# Patient Record
Sex: MALE | Race: WHITE | Employment: FULL TIME | URBAN - METROPOLITAN AREA
[De-identification: names, ages, dates, MRNs, and addresses within clinical notes are randomized per-mention and may not be internally consistent; named-entity substitution may affect disease eponyms.]

---

## 2018-02-02 ENCOUNTER — TRANSFERRED RECORDS (OUTPATIENT)
Dept: HEALTH INFORMATION MANAGEMENT | Facility: CLINIC | Age: 48
End: 2018-02-02

## 2018-04-27 ENCOUNTER — OFFICE VISIT (OUTPATIENT)
Dept: FAMILY MEDICINE | Facility: CLINIC | Age: 48
End: 2018-04-27
Payer: COMMERCIAL

## 2018-04-27 VITALS
SYSTOLIC BLOOD PRESSURE: 122 MMHG | HEIGHT: 72 IN | HEART RATE: 73 BPM | BODY MASS INDEX: 27.77 KG/M2 | DIASTOLIC BLOOD PRESSURE: 80 MMHG | OXYGEN SATURATION: 97 % | WEIGHT: 205 LBS | TEMPERATURE: 98 F

## 2018-04-27 DIAGNOSIS — R00.2 PALPITATIONS: Primary | ICD-10-CM

## 2018-04-27 DIAGNOSIS — J45.20 MILD INTERMITTENT ASTHMA WITHOUT COMPLICATION: ICD-10-CM

## 2018-04-27 DIAGNOSIS — R07.89 ATYPICAL CHEST PAIN: ICD-10-CM

## 2018-04-27 DIAGNOSIS — R94.31 ABNORMAL ELECTROCARDIOGRAM: ICD-10-CM

## 2018-04-27 LAB — TROPONIN I SERPL-MCNC: <0.015 UG/L (ref 0–0.04)

## 2018-04-27 PROCEDURE — 93000 ELECTROCARDIOGRAM COMPLETE: CPT | Performed by: INTERNAL MEDICINE

## 2018-04-27 PROCEDURE — 36415 COLL VENOUS BLD VENIPUNCTURE: CPT | Performed by: INTERNAL MEDICINE

## 2018-04-27 PROCEDURE — 99214 OFFICE O/P EST MOD 30 MIN: CPT | Performed by: INTERNAL MEDICINE

## 2018-04-27 PROCEDURE — 86141 C-REACTIVE PROTEIN HS: CPT | Performed by: INTERNAL MEDICINE

## 2018-04-27 PROCEDURE — 84484 ASSAY OF TROPONIN QUANT: CPT | Performed by: INTERNAL MEDICINE

## 2018-04-27 RX ORDER — VENLAFAXINE HYDROCHLORIDE 150 MG/1
150 CAPSULE, EXTENDED RELEASE ORAL DAILY
COMMUNITY

## 2018-04-27 RX ORDER — DEXTROAMPHETAMINE SACCHARATE, AMPHETAMINE ASPARTATE MONOHYDRATE, DEXTROAMPHETAMINE SULFATE AND AMPHETAMINE SULFATE 3.75; 3.75; 3.75; 3.75 MG/1; MG/1; MG/1; MG/1
CAPSULE, EXTENDED RELEASE ORAL
Refills: 0 | COMMUNITY
Start: 2017-09-07

## 2018-04-27 ASSESSMENT — ANXIETY QUESTIONNAIRES
IF YOU CHECKED OFF ANY PROBLEMS ON THIS QUESTIONNAIRE, HOW DIFFICULT HAVE THESE PROBLEMS MADE IT FOR YOU TO DO YOUR WORK, TAKE CARE OF THINGS AT HOME, OR GET ALONG WITH OTHER PEOPLE: NOT DIFFICULT AT ALL
6. BECOMING EASILY ANNOYED OR IRRITABLE: NOT AT ALL
GAD7 TOTAL SCORE: 0
3. WORRYING TOO MUCH ABOUT DIFFERENT THINGS: NOT AT ALL
2. NOT BEING ABLE TO STOP OR CONTROL WORRYING: NOT AT ALL
1. FEELING NERVOUS, ANXIOUS, OR ON EDGE: NOT AT ALL
7. FEELING AFRAID AS IF SOMETHING AWFUL MIGHT HAPPEN: NOT AT ALL
5. BEING SO RESTLESS THAT IT IS HARD TO SIT STILL: NOT AT ALL

## 2018-04-27 ASSESSMENT — PATIENT HEALTH QUESTIONNAIRE - PHQ9: 5. POOR APPETITE OR OVEREATING: NOT AT ALL

## 2018-04-27 ASSESSMENT — PAIN SCALES - GENERAL: PAINLEVEL: NO PAIN (0)

## 2018-04-27 NOTE — MR AVS SNAPSHOT
After Visit Summary   4/27/2018    Conor Contreras    MRN: [id number]           Patient Information     Date Of Birth          1970        Visit Information        Provider Department      4/27/2018 7:40 AM Rocky Yi MD [facility]        Today's Diagnoses     Palpitations    -  1    Abnormal electrocardiogram        Atypical chest pain          Care Instructions    At [facility], we strive to deliver an exceptional experience to you, every time we see you.  If you receive a survey in the mail, please send us back your thoughts. We really do value your feedback.    Based on your medical history, these are the current health maintenance/preventive care services that you are due for (some may have been done at this visit.)  Health Maintenance Due   Topic Date Due     DEPRESSION ACTION PLAN Q1 YR  03/07/1987     HIV SCREEN (SYSTEM ASSIGNED)  03/07/1987     PHQ-9 Q6 MONTHS  09/18/2014     ASTHMA ACTION PLAN Q1 YR  11/07/2015     ASTHMA CONTROL TEST Q6 MOS  07/25/2016     INFLUENZA VACCINE (1) 09/01/2017         Suggested websites for health information:  Www.Swyft.[employer]Cal : Up to date and easily searchable information on multiple topics.  Www.medlineplus.gov : medication info, interactive tutorials, watch real surgeries online  Www.familydoctor.org : good info from the Academy of Family Physicians  Www.cdc.gov : public health info, travel advisories, epidemics (H1N1)  Www.aap.org : children's health info, normal development, vaccinations  Www.health.[facility].[place].us : [place] dept of health, public health issues in [place], N1N1    Your care team:                            Family Medicine Internal Medicine   MD Rocky Rodriguez MD Shantel Branch-Fleming, MD Katya Georgiev PA-C Nam Ho, MD Pediatrics   UNA Humphries, CNP Nargis Yousif APRN MD Rosalinda Diaz MD Deborah Mielke, MD Kim Thein, APRN  "CNP      Clinic hours: Monday - Thursday 7 am-7 pm; Fridays 7 am-5 pm.   Urgent care: Monday - Friday 11 am-9 pm; Saturday and Sunday 9 am-5 pm.  Pharmacy : Monday -Thursday 8 am-8 pm; Friday 8 am-6 pm; Saturday and Sunday 9 am-5 pm.     Clinic: [phone]   Pharmacy: [phone]            Follow-ups after your visit        Future tests that were ordered for you today     Open Future Orders        Priority Expected Expires Ordered    Exercise Stress Echocardiogram Routine  4/27/2019 4/27/2018            Who to contact     If you have questions or need follow up information about today's clinic visit or your schedule please contact [facility] directly at [phone].  Normal or non-critical lab and imaging results will be communicated to you by MyChart, letter or phone within 4 business days after the clinic has received the results. If you do not hear from us within 7 days, please contact the clinic through [employer] or phone. If you have a critical or abnormal lab result, we will notify you by phone as soon as possible.  Submit refill requests through [employer] or call your pharmacy and they will forward the refill request to us. Please allow 3 business days for your refill to be completed.          Additional Information About Your Visit        [employer] Information     [employer] gives you secure access to your electronic health record. If you see a primary care provider, you can also send messages to your care team and make appointments. If you have questions, please call your primary care clinic.  If you do not have a primary care provider, please call [phone] and they will assist you.        Care EveryWhere ID     This is your Care EveryWhere ID. This could be used by other organizations to access your [place] medical records  [id number]        Your Vitals Were     Pulse Temperature Height Pulse Oximetry BMI (Body Mass Index)       73 98  F (36.7  C) (Oral) 5' 11.5\" (1.816 m) 97% " 28.19 kg/m2        Blood Pressure from Last 3 Encounters:   04/27/18 122/80   12/07/16 111/76   04/05/16 123/80    Weight from Last 3 Encounters:   04/27/18 205 lb (93 kg)   12/07/16 187 lb (84.8 kg)   04/05/16 189 lb 6.4 oz (85.9 kg)              We Performed the Following     CRP cardiac risk     EKG 12-lead complete w/read - Clinics     Troponin I        Primary Care Provider Office Phone # Fax #    Rocky Yi [phone]       [address]  [facility] [address]        Equal Access to Services     [facility] CenterSAE : Hadii aad ku hadasho Soomaali, waaxda luqadaha, qaybta kaalmada adeegyada, waxay tala owens . So [facility] [phone].    ATENCIÓN: Si habla español, tiene a jimenez disposición servicios gratuitos de asistencia lingüística. Llame al [phone].    We comply with applicable federal civil rights laws and [place] laws. We do not discriminate on the basis of race, color, national origin, age, disability, sex, sexual orientation, or gender identity.            Thank you!     Thank you for choosing [facility]  for your care. Our goal is always to provide you with excellent care. Hearing back from our patients is one way we can continue to improve our services. Please take a few minutes to complete the written survey that you may receive in the mail after your visit with us. Thank you!             Your Updated Medication List - Protect others around you: Learn how to safely use, store and throw away your medicines at www.disposemymeds.org.          This list is accurate as of 4/27/18  8:31 AM.  Always use your most recent med list.                   Brand Name Dispense Instructions for use Diagnosis    amphetamine-dextroamphetamine 15 MG per 24 hr capsule    ADDERALL XR     TAKE ONE CAPSULE BY MOUTH EVERY MORNING PLEASE MAKE AN APPOINTMENT FOR FUTURE REFILLS        cetirizine 10 MG tablet    zyrTEC    30 tablet    Take 1 tablet (10 mg) by  mouth At Bedtime    Chronic nonallergic rhinitis       EFFEXOR  MG 24 hr capsule   Generic drug:  venlafaxine      Take 150 mg by mouth daily

## 2018-04-27 NOTE — PROGRESS NOTES
SUBJECTIVE:   Conor Contreras is a 48 year old male who presents to clinic today for the following health issues:    Palpitations     Onset: 1-2 months    Description:   Character: Sensation of heart fluttering  Radiation: 2 days  Duration: all day     Intensity: mild    Progression of Symptoms:  improving    Accompanying Signs & Symptoms:Chest pain  Shortness of breath: no  Sweating: no  Nausea/vomiting: no  Lightheadedness: no  Palpitations: YES  Fever/Chills: no  Cough: no  Heartburn: no    History:   Family history of heart disease YES- grandfather had heart issue/stroke  Tobacco use: no    Precipitating factors: Excessive intake of caffeine, associated with daily Adderall use followed by exercise.  Worse with exertion: no  Worse with deep breaths :  no    Alleviating factors: none         Therapies Tried and outcome: none    Asthma Follow-Up    Was ACT completed today?    Yes    ACT Total Scores 4/27/2018   ACT TOTAL SCORE -   ASTHMA ER VISITS -   ASTHMA HOSPITALIZATIONS -   ACT TOTAL SCORE (Goal Greater than or Equal to 20) 25   In the past 12 months, how many times did you visit the emergency room for your asthma without being admitted to the hospital? 0   In the past 12 months, how many times were you hospitalized overnight because of your asthma? 0       Recent asthma triggers that patient is dealing with: None        Problem list and histories reviewedProblem list and histories reviewed & adjusted, as indicated.  Additional history: as documented    Patient Active Problem List   Diagnosis     ADD (attention deficit disorder)     HARLAN (obstructive sleep apnea)     Hyperlipidemia LDL goal <160     Generalized anxiety disorder     Benign skin lesion of face     Dysthymia     Allergic rhinitis     Abnormal CXR perihilar nodes on CT chest     History of drug abuse     Alcohol abuse     Mild intermittent asthma     Past Surgical History:   Procedure Laterality Date     ENDOBRONCHIAL ULTRASOUND FLEXIBLE N/A  2015    Procedure: ENDOBRONCHIAL ULTRASOUND FLEXIBLE;  Surgeon: Ramakrishna Williamson MD;  Location:  GI     VASECTOMY         Social History   Substance Use Topics     Smoking status: Former Smoker     Packs/day: 0.50     Years: 10.00     Types: Cigarettes     Quit date: 3/18/2001     Smokeless tobacco: Never Used      Comment: QUIT 9 YEARS AGO     Alcohol use 0.0 oz/week     0 Standard drinks or equivalent per week      Comment: very rarely     Family History   Problem Relation Age of Onset     C.A.D. Maternal Grandfather      Alcohol/Drug Maternal Grandfather       from alcoholism     CEREBROVASCULAR DISEASE Maternal Grandfather      Lipids Father      CANCER Maternal Grandmother      Pancreatic     CANCER Paternal Grandmother      Lung, may not have been primary     HEART DISEASE Paternal Grandfather      Anxiety Disorder Sister      Anxiety Disorder Sister      Asthma No family hx of      DIABETES No family hx of      Hypertension No family hx of      Breast Cancer No family hx of      Cancer - colorectal No family hx of      Prostate Cancer No family hx of      Connective Tissue Disorder No family hx of      Thyroid Disease No family hx of      LUNG DISEASE Maternal Uncle      Asthma versus sarcoid         Current Outpatient Prescriptions   Medication Sig Dispense Refill     amphetamine-dextroamphetamine (ADDERALL XR) 15 MG per 24 hr capsule TAKE ONE CAPSULE BY MOUTH EVERY MORNING PLEASE MAKE AN APPOINTMENT FOR FUTURE REFILLS  0     cetirizine (ZYRTEC) 10 MG tablet Take 1 tablet (10 mg) by mouth At Bedtime 30 tablet 11     venlafaxine (EFFEXOR XR) 150 MG 24 hr capsule Take 150 mg by mouth daily       No Known Allergies  Recent Labs   Lab Test  16   1147  01/07/15   0829  12   0858  10/27/11   1558  03/09/10   0933   LDL   --   122  132*   --   169*   HDL   --   50  39*   --   37*   TRIG   --   114  164*   --   139   ALT  67  56   --    --    --    CR   --   0.97   --    --    --   "  GFRESTIMATED   --   83   --    --    --    GFRESTBLACK   --   >90   GFR Calc     --    --    --    POTASSIUM   --   3.7   --    --    --    TSH   --   1.46   --   1.38   --       BP Readings from Last 3 Encounters:   04/27/18 122/80   12/07/16 111/76   04/05/16 123/80    Wt Readings from Last 3 Encounters:   04/27/18 205 lb (93 kg)   12/07/16 187 lb (84.8 kg)   04/05/16 189 lb 6.4 oz (85.9 kg)                 ROS:  CONSTITUTIONAL: NEGATIVE for fever, chills, change in weight  INTEGUMENTARY/SKIN: NEGATIVE for worrisome rashes, moles or lesions  EYES: NEGATIVE for vision changes or irritation  ENT/MOUTH: NEGATIVE for ear, mouth and throat problems  RESP: NEGATIVE for significant cough or SOB  CV: NEGATIVE for claudication, diaphoresis, lower extremity edema, orthopnea, palpitations and paroxysmal nocturnal dyspnea  GI: NEGATIVE for nausea, abdominal pain, heartburn, or change in bowel habits  : NEGATIVE for frequency, dysuria, or hematuria  MUSCULOSKELETAL: NEGATIVE for significant arthralgias or myalgia  NEURO: NEGATIVE for weakness, dizziness or paresthesias  ENDOCRINE: NEGATIVE for temperature intolerance, skin/hair changes  HEME: NEGATIVE for bleeding problems  PSYCHIATRIC: NEGATIVE for changes in mood or affect    OBJECTIVE:     /80 (BP Location: Left arm, Patient Position: Chair, Cuff Size: Adult Regular)  Pulse 73  Temp 98  F (36.7  C) (Oral)  Ht 5' 11.5\" (1.816 m)  Wt 205 lb (93 kg)  SpO2 97%  BMI 28.19 kg/m2  Body mass index is 28.19 kg/(m^2).  GENERAL: healthy, alert and no distress  EYES: Eyes grossly normal to inspection and conjunctivae and sclerae normal  HENT: normal cephalic/atraumatic and oral mucous membranes moist  RESP: lungs clear to auscultation - no rales, rhonchi or wheezes  CV: regular rate and rhythm, normal S1 S2, no S3 or S4, no murmur, click or rub, no peripheral edema and peripheral pulses strong  MS: no gross musculoskeletal defects noted, no edema  SKIN: " no suspicious lesions or rashes  NEURO: Normal strength and tone, mentation intact and speech normal  PSYCH: mentation appears normal, affect normal/bright    Diagnostic Test Results:  Results for orders placed or performed in visit on 04/27/18 (from the past 24 hour(s))   Troponin I   Result Value Ref Range    Troponin I ES <0.015 0.000 - 0.045 ug/L   12-lead EKG shows T-wave inversions at leads V1 and V2.    ASSESSMENT/PLAN:     (R00.2) Palpitations  (primary encounter diagnosis)  Comment: Secondary to high caffeine intake, Adderall use and increased exercise.  Plan: EKG 12-lead complete w/read - Clinics            (R07.89) Atypical chest pain  Comment: Despite negative Troponin I, proceed with stress test.  Plan: Exercise Stress Echocardiogram            (R94.31) Abnormal electrocardiogram  Comment: No underlying ischemic heart disease. EKG shows T-wave inversions at anterior leads.  Plan: Troponin I, CRP cardiac risk, Exercise Stress         Echocardiogram            (J45.20) Mild intermittent asthma without complication  Comment: Stable condition without recent exacerbations.      Follow-up visit if condition worsens.    Rocky Yi MD  [facility]

## 2018-04-27 NOTE — PATIENT INSTRUCTIONS
At [facility], we strive to deliver an exceptional experience to you, every time we see you.  If you receive a survey in the mail, please send us back your thoughts. We really do value your feedback.    Based on your medical history, these are the current health maintenance/preventive care services that you are due for (some may have been done at this visit.)  Health Maintenance Due   Topic Date Due     DEPRESSION ACTION PLAN Q1 YR  03/07/1987     HIV SCREEN (SYSTEM ASSIGNED)  03/07/1987     PHQ-9 Q6 MONTHS  09/18/2014     ASTHMA ACTION PLAN Q1 YR  11/07/2015     ASTHMA CONTROL TEST Q6 MOS  07/25/2016     INFLUENZA VACCINE (1) 09/01/2017         Suggested websites for health information:  Www.[facility].org : Up to date and easily searchable information on multiple topics.  Www.medlineplus.gov : medication info, interactive tutorials, watch real surgeries online  Www.familydoctor.org : good info from the Academy of Family Physicians  Www.cdc.gov : public health info, travel advisories, epidemics (H1N1)  Www.aap.org : children's health info, normal development, vaccinations  Www.health.state.[place].us : [place] dept of health, public health issues in [place], N1N1    Your care team:                            Family Medicine Internal Medicine   MD Rocky Rodriguez MD Shantel Branch-Fleming, MD Katya Georgiev PA-C Nam Ho, MD Pediatrics   UNA Humphries, ANNALEE Yousif APRN MD Rosalinda Diaz MD Deborah Mielke, MD Kim Thein, APRN [facility]      Clinic hours: Monday - Thursday 7 am-7 pm; Fridays 7 am-5 pm.   Urgent care: Monday - Friday 11 am-9 pm; Saturday and Sunday 9 am-5 pm.  Pharmacy : Monday -Thursday 8 am-8 pm; Friday 8 am-6 pm; Saturday and Sunday 9 am-5 pm.     Clinic: [phone]   Pharmacy: [phone]

## 2018-04-28 ASSESSMENT — ANXIETY QUESTIONNAIRES: GAD7 TOTAL SCORE: 0

## 2018-04-28 ASSESSMENT — PATIENT HEALTH QUESTIONNAIRE - PHQ9: SUM OF ALL RESPONSES TO PHQ QUESTIONS 1-9: 2

## 2018-04-28 ASSESSMENT — ASTHMA QUESTIONNAIRES: ACT_TOTALSCORE: 25

## 2018-04-29 LAB — CRP SERPL HS-MCNC: 3.6 MG/L

## 2018-05-01 ENCOUNTER — TELEPHONE (OUTPATIENT)
Dept: CARDIOLOGY | Facility: CLINIC | Age: 48
End: 2018-05-01

## 2018-05-01 NOTE — TELEPHONE ENCOUNTER
Called patient prior to stress echo scheduled for May 2 to discuss necessary preparation for test and assess for questions/concerns.  Reminded patient to remain NPO except water for at least 3 hours prior to test, take all medications as usual, to avoid caffeine and nicotine for 12 hr (including chocolate, decaf, Excedrin) and to wear comfortable clothing and shoes. Also informed patient that procedure is on a recumbent bike rather than treadmill. All questions answered, patient verbalized understanding.

## 2018-05-02 ENCOUNTER — RADIANT APPOINTMENT (OUTPATIENT)
Dept: CARDIOLOGY | Facility: CLINIC | Age: 48
End: 2018-05-02
Attending: INTERNAL MEDICINE
Payer: COMMERCIAL

## 2018-05-02 VITALS — DIASTOLIC BLOOD PRESSURE: 78 MMHG | HEART RATE: 76 BPM | SYSTOLIC BLOOD PRESSURE: 121 MMHG

## 2018-05-02 DIAGNOSIS — R94.31 ABNORMAL ELECTROCARDIOGRAM: ICD-10-CM

## 2018-05-02 DIAGNOSIS — R07.89 ATYPICAL CHEST PAIN: ICD-10-CM

## 2018-05-02 PROCEDURE — 93325 DOPPLER ECHO COLOR FLOW MAPG: CPT | Mod: TC

## 2018-05-02 PROCEDURE — 93325 DOPPLER ECHO COLOR FLOW MAPG: CPT | Mod: 26 | Performed by: INTERNAL MEDICINE

## 2018-05-02 PROCEDURE — 93352 ADMIN ECG CONTRAST AGENT: CPT

## 2018-05-02 PROCEDURE — 93017 CV STRESS TEST TRACING ONLY: CPT

## 2018-05-02 PROCEDURE — 93018 CV STRESS TEST I&R ONLY: CPT | Performed by: INTERNAL MEDICINE

## 2018-05-02 PROCEDURE — 93321 DOPPLER ECHO F-UP/LMTD STD: CPT | Mod: TC

## 2018-05-02 PROCEDURE — 93350 STRESS TTE ONLY: CPT | Mod: 26 | Performed by: INTERNAL MEDICINE

## 2018-05-02 PROCEDURE — 93350 STRESS TTE ONLY: CPT | Mod: TC

## 2018-05-02 PROCEDURE — 93016 CV STRESS TEST SUPVJ ONLY: CPT

## 2018-05-02 PROCEDURE — 93321 DOPPLER ECHO F-UP/LMTD STD: CPT | Mod: 26 | Performed by: INTERNAL MEDICINE

## 2018-05-02 RX ADMIN — Medication 3 ML: at 09:11

## 2018-05-02 NOTE — NURSING NOTE
Patient presents today for stress echo ordered by MD. Prior to patient visit, chart prep done including confirmation of order, medications reviewed for contraindications, reviewed previous EKG's for trends & concerns and reviewed patient's medical history.     IV started in left AC with a 22G Jeclo Catheter.      Echo technician completed resting portion of echo.     Stress portion of echo completed utilizing bike and pictures taken at [facility].  Blood pressure taken every 2 minutes and documented in Muse system.       Optison medication given 3 ml, wasted 6 ml  3ml Optison mixed with 6ml Saline - [id number]     Atropine medication given  0.4                Atropine NDC# 24078-933-16       Patient offered complaints of: fatigue     After completion of stress echo, recovery period with blood pressure monitoring occurs at 1, 3 and 5 minutes and documented in Muse.  IV removed and water provided to patient.  Patient education provided about cardiology interpretation and primary provider will be notified of results.     Dr Ordaz provided supervision of the tests performed today.

## 2018-07-06 ENCOUNTER — OFFICE VISIT (OUTPATIENT)
Dept: FAMILY MEDICINE | Facility: CLINIC | Age: 48
End: 2018-07-06
Payer: COMMERCIAL

## 2018-07-06 ENCOUNTER — RADIANT APPOINTMENT (OUTPATIENT)
Dept: GENERAL RADIOLOGY | Facility: CLINIC | Age: 48
End: 2018-07-06
Attending: INTERNAL MEDICINE
Payer: COMMERCIAL

## 2018-07-06 VITALS
HEIGHT: 72 IN | SYSTOLIC BLOOD PRESSURE: 110 MMHG | OXYGEN SATURATION: 96 % | RESPIRATION RATE: 14 BRPM | TEMPERATURE: 97.7 F | BODY MASS INDEX: 27.36 KG/M2 | HEART RATE: 62 BPM | WEIGHT: 202 LBS | DIASTOLIC BLOOD PRESSURE: 75 MMHG

## 2018-07-06 DIAGNOSIS — S16.1XXS CERVICAL MYOFASCIAL STRAIN, SEQUELA: Primary | ICD-10-CM

## 2018-07-06 DIAGNOSIS — R59.0 MEDIASTINAL LYMPHADENOPATHY: ICD-10-CM

## 2018-07-06 DIAGNOSIS — I65.29 CAROTID ATHEROSCLEROSIS, UNSPECIFIED LATERALITY: ICD-10-CM

## 2018-07-06 DIAGNOSIS — R59.1 LYMPHADENOPATHY OF HEAD AND NECK: ICD-10-CM

## 2018-07-06 PROCEDURE — 72040 X-RAY EXAM NECK SPINE 2-3 VW: CPT | Mod: FY

## 2018-07-06 PROCEDURE — 99214 OFFICE O/P EST MOD 30 MIN: CPT | Performed by: INTERNAL MEDICINE

## 2018-07-06 ASSESSMENT — PAIN SCALES - GENERAL: PAINLEVEL: MODERATE PAIN (4)

## 2018-07-06 NOTE — PROGRESS NOTES
SUBJECTIVE:   Conor Contreras is a 48 year old male who presents to clinic today for the following health issues:      Neck Pain  Onset: 2 months    Description:   Location: Left side back of the neck  Radiation: none    Intensity: 4/10    Progression of Symptoms:  worsening and intermittent    Accompanying Signs & Symptoms:  Burning, prickly sensation (paresthesias) in arm(s): no   Numbness in arm(s): no   Weakness in arm(s):  no   Fever: no   Headache: yes, once or twice a week   Nausea and/or vomiting: no     History:   Trauma: no   Previous neck pain: YES- over use during wokr  Previous surgery or injections: no   Previous Imaging (MRI,X ray): YES- at dentist    Precipitating factors:   Does movement increase the pain:  YES    Alleviating factors:   Tylenol, ibuprofen, rest  Therapies Tried and outcome:  Tylenol, ibuprofen, rest      Problem list and histories reviewed & adjusted, as indicated.  Additional history: as documented    Patient Active Problem List   Diagnosis     ADD (attention deficit disorder)     HARLAN (obstructive sleep apnea)     Hyperlipidemia LDL goal <160     Generalized anxiety disorder     Benign skin lesion of face     Dysthymia     Allergic rhinitis     Abnormal CXR perihilar nodes on CT chest     History of drug abuse     Mild intermittent asthma     Palpitations     Past Surgical History:   Procedure Laterality Date     ENDOBRONCHIAL ULTRASOUND FLEXIBLE N/A 1/16/2015    Procedure: ENDOBRONCHIAL ULTRASOUND FLEXIBLE;  Surgeon: Ramakrishna Williamson MD;  Location: [facility]     VASECTOMY  2005       Social History   Substance Use Topics     Smoking status: Former Smoker     Packs/day: 0.50     Years: 10.00     Types: Cigarettes     Quit date: 3/18/2001     Smokeless tobacco: Never Used      Comment: QUIT 9 YEARS AGO     Alcohol use 0.0 oz/week     0 Standard drinks or equivalent per week      Comment: very rarely     Family History   Problem Relation Age of Onset     C.A.D. Maternal  Grandfather      Alcohol/Drug Maternal Grandfather       from alcoholism     Cerebrovascular Disease Maternal Grandfather      Lipids Father      Cancer Maternal Grandmother      Pancreatic     Cancer Paternal Grandmother      Lung, may not have been primary     HEART DISEASE Paternal Grandfather      Anxiety Disorder Sister      Anxiety Disorder Sister      Asthma No family hx of      Diabetes No family hx of      Hypertension No family hx of      Breast Cancer No family hx of      Cancer - colorectal No family hx of      Prostate Cancer No family hx of      Connective Tissue Disorder No family hx of      Thyroid Disease No family hx of      LUNG DISEASE Maternal Uncle      Asthma versus sarcoid         No Known Allergies  Recent Labs   Lab Test  16   1147  01/07/15   0829  12   0858  10/27/11   1558   LDL   --   122  132*   --    HDL   --   50  39*   --    TRIG   --   114  164*   --    ALT  67  56   --    --    CR   --   0.97   --    --    GFRESTIMATED   --   83   --    --    GFRESTBLACK   --   >90   GFR Calc     --    --    POTASSIUM   --   3.7   --    --    TSH   --   1.46   --   1.38      BP Readings from Last 3 Encounters:   18 110/75   18 121/78   18 122/80    Wt Readings from Last 3 Encounters:   18 202 lb (91.6 kg)   18 205 lb (93 kg)   16 187 lb (84.8 kg)                 ROS:  CONSTITUTIONAL: NEGATIVE for fever, chills, change in weight  INTEGUMENTARY/SKIN: NEGATIVE for worrisome rashes, moles or lesions  EYES: NEGATIVE for vision changes or irritation  ENT/MOUTH: NEGATIVE for ear, mouth and throat problems  RESP: NEGATIVE for significant cough or SOB  CV: NEGATIVE for chest pain, palpitations or peripheral edema  GI: NEGATIVE for nausea, abdominal pain, heartburn, or change in bowel habits  : NEGATIVE for frequency, dysuria, or hematuria  MUSCULOSKELETAL:As above.  NEURO: NEGATIVE for weakness, dizziness or paresthesias  ENDOCRINE:  "NEGATIVE for temperature intolerance, skin/hair changes  HEME: NEGATIVE for bleeding problems  PSYCHIATRIC: NEGATIVE for changes in mood or affect    OBJECTIVE:     /75 (BP Location: Right arm, Patient Position: Chair, Cuff Size: Adult Large)  Pulse 62  Temp 97.7  F (36.5  C) (Oral)  Resp 14  Ht 5' 11.5\" (1.816 m)  Wt 202 lb (91.6 kg)  SpO2 96%  BMI 27.78 kg/m2  Body mass index is 27.78 kg/(m^2).  GENERAL: healthy, alert and no distress  EYES: Eyes grossly normal to inspection and conjunctivae and sclerae normal  HENT: normal cephalic/atraumatic and oral mucous membranes moist  RESP: lungs clear to auscultation - no rales, rhonchi or wheezes  CV: regular rate and rhythm, normal S1 S2, no S3 or S4, no murmur, click or rub, no peripheral edema and peripheral pulses strong  ABDOMEN: soft, nontender, no hepatosplenomegaly, no masses and bowel sounds normal  MS: Tenderness on palpation of both trapezius muscles.  SKIN: no suspicious lesions or rashes  NEURO: Normal strength and tone, mentation intact and speech normal  PSYCH: mentation appears normal, affect normal/bright    Diagnostic Test Results:  Results for orders placed or performed in visit on 07/06/18   XR Cervical Spine 2/3 Views    Narrative    XR CERVICAL SPINE 2/3 VWS 7/6/2018 8:28 AM    COMPARISON: None.    HISTORY: Cervical myofascial strain.      Impression    IMPRESSION: Straightening of the normal cervical lordosis, may be  positional in nature. Vertebral heights are preserved without evidence  of fracture. Mild disc space loss at C6-7. No prevertebral soft tissue  swelling or listhesis identified. Facet degenerative changes most  pronounced at C2-3.    RENE MAYEN MD       ASSESSMENT/PLAN:     (S16.1XXS) Cervical myofascial strain, sequela  (primary encounter diagnosis)  Comment: Secondary to loss of cervical lordosis. Conservative measures recommended before trying muscle relaxants.  Plan: XR Cervical Spine 2/3 Views            (I65.29) " Carotid atherosclerosis, unspecified laterality, suspected  Comment: Supposedly noted from recent dental x-rays, but patient is asymptomatic.  Plan: US Carotid Bilateral            (R59.1) Lymphadenopathy of head and neck, suspected  Comment: Unremarkable exam but supposedly noted from recent dental x-rays as alleged official report is non-committal to ruling out lymphadenopathy.  Plan: CT Soft Tissue Neck w Contrast          Follow-up visit if condition worsens.    Rocky Yi MD  [facility]

## 2018-07-06 NOTE — PATIENT INSTRUCTIONS
At [facility], we strive to deliver an exceptional experience to you, every time we see you.  If you receive a survey in the mail, please send us back your thoughts. We really do value your feedback.    Based on your medical history, these are the current health maintenance/preventive care services that you are due for (some may have been done at this visit.)  Health Maintenance Due   Topic Date Due     DEPRESSION ACTION PLAN Q1 YR  03/07/1987     HIV SCREEN (SYSTEM ASSIGNED)  03/07/1987     ASTHMA ACTION PLAN Q1 YR  11/07/2015       Suggested websites for health information:  Www.Mindscore.Vitrue : Up to date and easily searchable information on multiple topics.  Www.Exosite.gov : medication info, interactive tutorials, watch real surgeries online  Www.familydoctor.org : good info from the Academy of Family Physicians  Www.cdc.gov : public health info, travel advisories, epidemics (H1N1)  Www.aap.org : children's health info, normal development, vaccinations  Www.health.[facility].[place].us : [place] dept of health, public health issues in [place], N1N1    Your care team:                            Family Medicine Internal Medicine   MD Rocky Rodriguez MD Shantel Branch-Fleming, MD Katya Georgiev PA-C Megan Hill, APRN ANNALEE Birmingham MD Pediatrics   Sean Hernandez PABEHTEL Hilliard, MD Nargis Betancourt APRN CNP   MD Rosalinda Frances MD Deborah Mielke, MD Kim Thein, APRN [facility]      Clinic hours: Monday - Thursday 7 am-7 pm; Fridays 7 am-5 pm.   Urgent care: Monday - Friday 11 am-9 pm; Saturday and Sunday 9 am-5 pm.  Pharmacy : Monday -Thursday 8 am-8 pm; Friday 8 am-6 pm; Saturday and Sunday 9 am-5 pm.     Clinic: [phone]   Pharmacy: [phone]

## 2018-07-06 NOTE — MR AVS SNAPSHOT
After Visit Summary   7/6/2018    oCnor Contreras    MRN: [id number]           Patient Information     Date Of Birth          1970        Visit Information        Provider Department      7/6/2018 7:20 AM Rocky Yi MD [facility]        Today's Diagnoses     Cervical myofascial strain, sequela    -  1    Carotid atherosclerosis, unspecified laterality        Lymphadenopathy of head and neck          Care Instructions    At [facility], we strive to deliver an exceptional experience to you, every time we see you.  If you receive a survey in the mail, please send us back your thoughts. We really do value your feedback.    Based on your medical history, these are the current health maintenance/preventive care services that you are due for (some may have been done at this visit.)  Health Maintenance Due   Topic Date Due     DEPRESSION ACTION PLAN Q1 YR  03/07/1987     HIV SCREEN (SYSTEM ASSIGNED)  03/07/1987     ASTHMA ACTION PLAN Q1 YR  11/07/2015       Suggested websites for health information:  Www.Cloudwear : Up to date and easily searchable information on multiple topics.  Www.medlineplus.gov : medication info, interactive tutorials, watch real surgeries online  Www.familydoctor.org : good info from the Academy of Family Physicians  Www.cdc.gov : public health info, travel advisories, epidemics (H1N1)  Www.aap.org : children's health info, normal development, vaccinations  Www.health.state.[place]us : [place] dept of health, public health issues in [place], N1N1    Your care team:                            Family Medicine Internal Medicine   MD Rocky Rodriguez MD Shantel Branch-Fleming, MD Katya Georgiev PA-C Megan Hill, GURINDER Birmingham MD Pediatrics   UNA Humphries, MD Nargis Betancourt APRN CNP   MD Rosalinda Frances MD Deborah Mielke, MD Kim Thein, APRN [facility]  hours: Monday - Thursday 7 am-7 pm; Fridays 7 am-5 pm.   Urgent care: Monday - Friday 11 am-9 pm; Saturday and Sunday 9 am-5 pm.  Pharmacy : Monday -Thursday 8 am-8 pm; Friday 8 am-6 pm; Saturday and Sunday 9 am-5 pm.     Clinic: [phone]   Pharmacy: [phone]              Follow-ups after your visit        Future tests that were ordered for you today     Open Future Orders        Priority Expected Expires Ordered    US Carotid Bilateral Routine  7/6/2019 7/6/2018    CT Soft Tissue Neck w Contrast Routine  7/6/2019 7/6/2018            Who to contact     If you have questions or need follow up information about today's clinic visit or your schedule please contact [facility] directly at [phone].  Normal or non-critical lab and imaging results will be communicated to you by MyChart, letter or phone within 4 business days after the clinic has received the results. If you do not hear from us within 7 days, please contact the clinic through KitCheckhart or phone. If you have a critical or abnormal lab result, we will notify you by phone as soon as possible.  Submit refill requests through Ortiva Wireless or call your pharmacy and they will forward the refill request to us. Please allow 3 business days for your refill to be completed.          Additional Information About Your Visit        KitCheckhart Information     [employer] gives you secure access to your electronic health record. If you see a primary care provider, you can also send messages to your care team and make appointments. If you have questions, please call your primary care clinic.  If you do not have a primary care provider, please call [phone] and they will assist you.        Care EveryWhere ID     This is your Care EveryWhere ID. This could be used by other organizations to access your [place] medical records  [id number]        Your Vitals Were     Pulse Temperature Respirations Height Pulse Oximetry BMI (Body Mass Index)    62  "97.7  F (36.5  C) (Oral) 14 5' 11.5\" (1.816 m) 96% 27.78 kg/m2       Blood Pressure from Last 3 Encounters:   07/06/18 110/75   05/02/18 121/78   04/27/18 122/80    Weight from Last 3 Encounters:   07/06/18 202 lb (91.6 kg)   04/27/18 205 lb (93 kg)   12/07/16 187 lb (84.8 kg)              We Performed the Following     XR Cervical Spine 2/3 Views        Primary Care Provider Office Phone # Fax #    Rocky Yi [phone]       [id number] [place]  [facility] [address]        Equal Access to Services     KEREN JIMENES : Hadii aad ku hadasho Sobrice, waaxda luqadaha, qaybta kaalmada adeegyada, waxay tala owens . So [facility] [phone].    ATENCIÓN: Si habla español, tiene a jimenez disposición servicios gratuitos de asistencia lingüística. [facility] [phone].    We comply with applicable federal civil rights laws and [place] laws. We do not discriminate on the basis of race, color, national origin, age, disability, sex, sexual orientation, or gender identity.            Thank you!     Thank you for choosing [facility]  for your care. Our goal is always to provide you with excellent care. Hearing back from our patients is one way we can continue to improve our services. Please take a few minutes to complete the written survey that you may receive in the mail after your visit with us. Thank you!             Your Updated Medication List - Protect others around you: Learn how to safely use, store and throw away your medicines at www.disposemymeds.org.          This list is accurate as of 7/6/18  8:07 AM.  Always use your most recent med list.                   Brand Name Dispense Instructions for use Diagnosis    amphetamine-dextroamphetamine 15 MG per 24 hr capsule    ADDERALL XR     TAKE ONE CAPSULE BY MOUTH EVERY MORNING PLEASE MAKE AN APPOINTMENT FOR FUTURE REFILLS        cetirizine 10 MG tablet    zyrTEC    30 tablet    Take 1 tablet (10 mg) by " mouth At Bedtime    Chronic nonallergic rhinitis       EFFEXOR  MG 24 hr capsule   Generic drug:  venlafaxine      Take 150 mg by mouth daily

## 2018-07-18 ENCOUNTER — RADIANT APPOINTMENT (OUTPATIENT)
Dept: ULTRASOUND IMAGING | Facility: CLINIC | Age: 48
End: 2018-07-18
Attending: INTERNAL MEDICINE
Payer: COMMERCIAL

## 2018-07-18 ENCOUNTER — RADIANT APPOINTMENT (OUTPATIENT)
Dept: CT IMAGING | Facility: CLINIC | Age: 48
End: 2018-07-18
Attending: INTERNAL MEDICINE
Payer: COMMERCIAL

## 2018-07-18 DIAGNOSIS — I65.29 CAROTID ATHEROSCLEROSIS, UNSPECIFIED LATERALITY: ICD-10-CM

## 2018-07-18 DIAGNOSIS — R59.1 LYMPHADENOPATHY OF HEAD AND NECK: ICD-10-CM

## 2018-07-18 PROCEDURE — 93880 EXTRACRANIAL BILAT STUDY: CPT | Performed by: INTERNAL MEDICINE

## 2018-07-18 PROCEDURE — 70491 CT SOFT TISSUE NECK W/DYE: CPT | Performed by: RADIOLOGY

## 2018-07-18 RX ORDER — IOPAMIDOL 755 MG/ML
100 INJECTION, SOLUTION INTRAVASCULAR ONCE
Status: COMPLETED | OUTPATIENT
Start: 2018-07-18 | End: 2018-07-18

## 2018-07-18 RX ADMIN — IOPAMIDOL 100 ML: 755 INJECTION, SOLUTION INTRAVASCULAR at 13:46

## 2018-07-19 PROBLEM — R59.0 MEDIASTINAL LYMPHADENOPATHY: Status: ACTIVE | Noted: 2018-07-19

## 2018-07-24 ENCOUNTER — MYC MEDICAL ADVICE (OUTPATIENT)
Dept: FAMILY MEDICINE | Facility: CLINIC | Age: 48
End: 2018-07-24

## 2018-07-24 DIAGNOSIS — M47.812 SPONDYLOSIS OF CERVICAL REGION WITHOUT MYELOPATHY OR RADICULOPATHY: Primary | ICD-10-CM

## 2018-07-24 DIAGNOSIS — S16.1XXS CERVICAL MYOFASCIAL STRAIN, SEQUELA: ICD-10-CM

## 2018-07-24 NOTE — TELEPHONE ENCOUNTER
E-visit instructions given to Conor to further discuss ongoing cervical pain.     Jaden Fajardo RN, BSN

## 2018-07-25 PROBLEM — M47.812 SPONDYLOSIS OF CERVICAL REGION WITHOUT MYELOPATHY OR RADICULOPATHY: Status: ACTIVE | Noted: 2018-07-25

## 2018-07-25 PROBLEM — S16.1XXS CERVICAL MYOFASCIAL STRAIN, SEQUELA: Status: ACTIVE | Noted: 2018-07-25

## 2018-07-25 RX ORDER — BACLOFEN 10 MG/1
10 TABLET ORAL 3 TIMES DAILY PRN
Qty: 90 TABLET | Refills: 5 | Status: SHIPPED | OUTPATIENT
Start: 2018-07-25 | End: 2019-09-23

## 2018-07-25 NOTE — TELEPHONE ENCOUNTER
E visit request was sent to patient from nurse on previous [facility] message. Please advise Dr Kd Donnelly CMA

## 2018-07-30 ENCOUNTER — OFFICE VISIT (OUTPATIENT)
Dept: PULMONOLOGY | Facility: CLINIC | Age: 48
End: 2018-07-30
Attending: INTERNAL MEDICINE
Payer: COMMERCIAL

## 2018-07-30 ENCOUNTER — TELEPHONE (OUTPATIENT)
Dept: PULMONOLOGY | Facility: CLINIC | Age: 48
End: 2018-07-30

## 2018-07-30 ENCOUNTER — RADIANT APPOINTMENT (OUTPATIENT)
Dept: CT IMAGING | Facility: CLINIC | Age: 48
End: 2018-07-30
Attending: INTERNAL MEDICINE
Payer: COMMERCIAL

## 2018-07-30 VITALS
TEMPERATURE: 97.1 F | DIASTOLIC BLOOD PRESSURE: 66 MMHG | OXYGEN SATURATION: 97 % | SYSTOLIC BLOOD PRESSURE: 113 MMHG | RESPIRATION RATE: 20 BRPM | WEIGHT: 202.8 LBS | BODY MASS INDEX: 27.89 KG/M2 | HEART RATE: 65 BPM

## 2018-07-30 DIAGNOSIS — R59.0 MEDIASTINAL LYMPHADENOPATHY: Primary | ICD-10-CM

## 2018-07-30 DIAGNOSIS — R91.8 PULMONARY NODULES: Primary | ICD-10-CM

## 2018-07-30 DIAGNOSIS — R59.0 MEDIASTINAL LYMPHADENOPATHY: ICD-10-CM

## 2018-07-30 PROCEDURE — 99204 OFFICE O/P NEW MOD 45 MIN: CPT | Performed by: INTERNAL MEDICINE

## 2018-07-30 PROCEDURE — 71250 CT THORAX DX C-: CPT | Performed by: RADIOLOGY

## 2018-07-30 ASSESSMENT — PAIN SCALES - GENERAL: PAINLEVEL: MILD PAIN (2)

## 2018-07-30 NOTE — PROGRESS NOTES
LUNG NODULE & INTERVENTIONAL PULMONARY CLINIC  [facility]     Conor Contreras MRN# [id number]   Age: 48 year old YOB: 1970     Reason for Consultation: lung nodule(s)    Requesting Physician: Rocky Yi MD  [id number] [place] [address]       Assessment and Plan:    1. Established multiple pulmonary lung nodule(s). Given the characteristics on current/previous imaging and risk factors; I would classify this to be Low (<6%) risk for cancer. Has numerous bilateral central calcified nodules in addition to calcified LN's. Although previous bronchoscopy was non-diagnostic, I would favor benign etiology including sarcoidosis vs granulomatous disease. There are two nodules in right lung that have increased in size; I will get a short-term ct in 3mo to f/u.     Billing: The patient was seen and examined by me and the findings, assessment, and plan as documented was explained to the patient/family who expressed understand.       Rene Shaikh MD   of Medicine  Interventional Pulmonology  Department of Pulmonary, Allergy, Critical Care and Sleep Medicine   [facility]  Pager: [phone]          History:     Conor Contreras is a 48 year old male with sig h/o for HARLAN, anxiety, ADD who is here for evaluation/followup of lung nodule(s).    - No new resp sx or complaints. Denies dyspnea or cough.   - CT chest performed in 2014 showing bilateral calcified nodules and mediastinal/hilar LAD. He had EBUS-TBNA in 2015 which was non-diagnostic with regard to FNA of LN. At that time, cultures were negative. Ig subclasses were unremarkable. CD4:CD8 ratio was 0.9. Airway exam was unremarkable.   - Personal hx of cancer: no  - Family hx of cancer: no  - Exposure hx: Denies asbestos or radon exposure   - Tobacco hx: 1/2ppd x5yrs. Quit 20yrs ago.   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 Has 4  cats and 1 dog. No allergies.   - My interpretation of the images relevant for this visit includes: RUL and RLL calcified nodules have increased in size since 2014. Otherwise numerable bilateral calcified nodules and LAD are stable.    - My interpretation of the PFT's relevant for this visit includes: Normal    Culprit Nodule(s):   Multiple bilateral lung nodule(s) that are sub 8 mm. First seen by chest CT on .  There is an increase in size    Other active medical problems include:   - has HARLAN. Uses CPAP.    - has anxiety and ADD. stable           Past Medical History:      Past Medical History:   Diagnosis Date     ADD (attention deficit disorder)      Dysthymia      Elevated lipids      Generalised anxiety disorder 2012     Lung nodules      HARLAN (obstructive sleep apnea) 2011    cpap at night     Uncomplicated asthma            Past Surgical History:      Past Surgical History:   Procedure Laterality Date     ENDOBRONCHIAL ULTRASOUND FLEXIBLE N/A 2015    Procedure: ENDOBRONCHIAL ULTRASOUND FLEXIBLE;  Surgeon: Ramakrishna Williamson MD;  Location:  GI     VASECTOMY            Social History:     Social History   Substance Use Topics     Smoking status: Former Smoker     Packs/day: 0.50     Years: 10.00     Types: Cigarettes     Quit date: 3/18/2001     Smokeless tobacco: Never Used      Comment: QUIT 9 YEARS AGO     Alcohol use 0.0 oz/week     0 Standard drinks or equivalent per week      Comment: very rarely          Family History:     Family History   Problem Relation Age of Onset     C.A.D. Maternal Grandfather      Alcohol/Drug Maternal Grandfather       from alcoholism     Cerebrovascular Disease Maternal Grandfather      Lipids Father      Cancer Maternal Grandmother      Pancreatic     Cancer Paternal Grandmother      Lung, may not have been primary     HEART DISEASE Paternal Grandfather      Anxiety Disorder Sister      Anxiety Disorder Sister      Asthma No family hx of       Diabetes No family hx of      Hypertension No family hx of      Breast Cancer No family hx of      Cancer - colorectal No family hx of      Prostate Cancer No family hx of      Connective Tissue Disorder No family hx of      Thyroid Disease No family hx of      LUNG DISEASE Maternal Uncle      Asthma versus sarcoid           Allergies:    No Known Allergies       Medications:     Current Outpatient Prescriptions   Medication Sig     amphetamine-dextroamphetamine (ADDERALL XR) 15 MG per 24 hr capsule TAKE ONE CAPSULE BY MOUTH EVERY MORNING PLEASE MAKE AN APPOINTMENT FOR FUTURE REFILLS     baclofen (LIORESAL) 10 MG tablet Take 1 tablet (10 mg) by mouth 3 times daily as needed for muscle spasms     cetirizine (ZYRTEC) 10 MG tablet Take 1 tablet (10 mg) by mouth At Bedtime     venlafaxine (EFFEXOR XR) 150 MG 24 hr capsule Take 150 mg by mouth daily     No current facility-administered medications for this visit.           Review of Systems:     CONSTITUTIONAL: negative for fever, chills, change in weight  INTEGUMENTARY/SKIN: no rash or obvious new lesions  ENT/MOUTH: no sore throat, new sinus pain or nasal drainage  RESP: see interval history  CV: negative for chest pain, palpitations or peripheral edema  GI: no nausea, vomiting, change in stools  : no dysuria  MUSCULOSKELETAL: no myalgias, arthralgias  ENDOCRINE: blood sugars with adequate control  PSYCHIATRIC: mood stable  LYMPHATIC: no new lymphadenopathy  HEME: no bleeding or easy bruisability  NEURO: no numbness, weakness, headaches         Physical Exam:     Temp:  [97.1  F (36.2  C)] 97.1  F (36.2  C)  Pulse:  [65] 65  Resp:  [20] 20  BP: (113)/(66) 113/66  SpO2:  [97 %] 97 %  Wt Readings from Last 4 Encounters:   07/30/18 92 kg (202 lb 12.8 oz)   07/06/18 91.6 kg (202 lb)   04/27/18 93 kg (205 lb)   12/07/16 84.8 kg (187 lb)     Constitutional:   Awake, alert and in no apparent distress     Eyes:   Nonicteric, LISBETH     ENT:    Trachea is midline. No  gross neck abnormalities      Neck:   Supple without supraclavicular or cervical lymphadenopathy     Lungs:   Good air flow.  No crackles. No rhonchi.  No wheezes.     Cardiovascular:   Normal S1 and S2.  RRR.  No murmur, gallop or rub.  Radial, DP and PT pulses normal and symmetric     Abdomen:   NABS, soft, nontender, nondistended.  No HSM.     Musculoskeletal:   No edema.      Neurologic:   Alert and conversant. Cranial nerves  intact.       Skin:   Warm, dry.  No rash on limited exam.           Current Laboratory Data:   All laboratory and imaging data reviewed.    No results found for this or any previous visit (from the past 24 hour(s)).         Previous Pulmonary Function Testing     FVC-Pred   Date Value Ref Range Status   01/12/2015 5.20 L      FVC-Pre   Date Value Ref Range Status   01/12/2015 4.92 L      FVC-%Pred-Pre   Date Value Ref Range Status   01/12/2015 94 %      FEV1-Pre   Date Value Ref Range Status   01/12/2015 3.91 L      FEV1-%Pred-Pre   Date Value Ref Range Status   01/12/2015 94 %      FEV1FVC-Pred   Date Value Ref Range Status   01/12/2015 80 %      FEV1FVC-Pre   Date Value Ref Range Status   01/12/2015 79 %      No results found for: 20029  FEFMax-Pred   Date Value Ref Range Status   01/12/2015 10.11 L/sec      FEFMax-Pre   Date Value Ref Range Status   01/12/2015 7.89 L/sec      FEFMax-%Pred-Pre   Date Value Ref Range Status   01/12/2015 78 %      ExpTime-Pre   Date Value Ref Range Status   01/12/2015 7.95 sec      FIFMax-Pre   Date Value Ref Range Status   01/12/2015 3.45 L/sec      FEV1FEV6-Pred   Date Value Ref Range Status   01/12/2015 81 %      FEV1FEV6-Pre   Date Value Ref Range Status   01/12/2015 80 %      No results found for: 20055         Previous Chest Imaging   No images are attached to the encounter.  No images are attached to the encounter or orders placed in the encounter.         Previous Cardiology Imaging   No results found for this or any previous visit (from the past  8760 hour(s)).

## 2018-07-30 NOTE — TELEPHONE ENCOUNTER
Contacted imaging scheduler to add patient on for CT chest today.  CT dept able to add patient prior to office visit today.    Left detailed message asking for patient to confirm that this will work out for him by returning call to  call center.    Kathy Pack RN

## 2018-07-30 NOTE — TELEPHONE ENCOUNTER
Date: 7/30/2018    Time of Call: 9:18 AM     Diagnosis: Mediastinal lymphadenopathy [R59.0]      [ VORB ] Ordering provider: Rene Shaikh  Order: CT Chest to be done today     Order received by: Kathy Pack RN

## 2018-07-30 NOTE — MR AVS SNAPSHOT
After Visit Summary   7/30/2018    Conor Contreras    MRN: [id number]           Patient Information     Date Of Birth          1970        Visit Information        Provider Department      7/30/2018 12:30 PM Rene Shaikh MD [facility]        Today's Diagnoses     Pulmonary nodules    -  1       Follow-ups after your visit        Follow-up notes from your care team     Return in about 3 months (around 10/30/2018).      Your next 10 appointments already scheduled     Aug 06, 2018  3:00 PM CDT   (Arrive by 2:45 PM)   [facility] Spine with Jorge Luis Simmons, PT   [facility] ([facility]  )    [address] [facility] [address] [phone]            Nov 05, 2018  1:00 PM CST   [facility] with [facility]   [facility]    [address] [place] [address] [phone]              Future tests that were ordered for you today     Open Future Orders        Priority Expected Expires Ordered    CT Chest w/o Contrast Routine  7/30/2019 7/30/2018            Who to contact     If you have questions or need follow up information about today's clinic visit or your schedule please contact [facility] directly at [phone].  Normal or non-critical lab and imaging results will be communicated to you by MyChart, letter or phone within 4 business days after the clinic has received the results. If you do not hear from us within 7 days, please contact the clinic through MyChart or phone. If you have a critical or abnormal lab result, we will notify you by phone as soon as possible.  Submit refill requests through GenieTown or call your pharmacy and they will forward the refill request to us. Please allow 3 business days for your refill to be completed.          Additional Information About Your Visit        iPerceptionshart Information     GenieTown gives you secure access  to your electronic health record. If you see a primary care provider, you can also send messages to your care team and make appointments. If you have questions, please call your primary care clinic.  If you do not have a primary care provider, please call [phone] and they will assist you.      HuntForce is an electronic gateway that provides easy, online access to your medical records. With HuntForce, you can request a clinic appointment, read your test results, renew a prescription or communicate with your care team.     To access your existing account, please contact your [facility] or call [phone] for assistance.        Care EveryWhere ID     This is your Care EveryWhere ID. This could be used by other organizations to access your [place] medical records  [id number]        Your Vitals Were     Pulse Temperature Respirations Pulse Oximetry BMI (Body Mass Index)       65 97.1  F (36.2  C) (Oral) 20 97% 27.89 kg/m2        Blood Pressure from Last 3 Encounters:   07/30/18 113/66   07/06/18 110/75   05/02/18 121/78    Weight from Last 3 Encounters:   07/30/18 92 kg (202 lb 12.8 oz)   07/06/18 91.6 kg (202 lb)   04/27/18 93 kg (205 lb)               Primary Care Provider Office Phone # Fax #    Rocky Yi [phone]       [address]  [facility] [address]        Equal Access to Services     [facility] Center[facility] AH: Hadii aad ku hadasho Soomaali, waaxda luqadaha, qaybta kaalmada adeegyada, bartolo owens . So [facility] [phone].    ATENCIÓN: Si habla español, tiene a jimenez disposición servicios gratuitos de asistencia lingüística. Alejandro al [phone].    We comply with applicable federal civil rights laws and [place] laws. We do not discriminate on the basis of race, color, national origin, age, disability, sex, sexual orientation, or gender identity.            Thank you!     Thank you for choosing [facility]   for your care. Our goal is always to provide you with excellent care. Hearing back from our patients is one way we can continue to improve our services. Please take a few minutes to complete the written survey that you may receive in the mail after your visit with us. Thank you!             Your Updated Medication List - Protect others around you: Learn how to safely use, store and throw away your medicines at www.disposemymeds.org.          This list is accurate as of 7/30/18  1:20 PM.  Always use your most recent med list.                   Brand Name Dispense Instructions for use Diagnosis    amphetamine-dextroamphetamine 15 MG per 24 hr capsule    ADDERALL XR     TAKE ONE CAPSULE BY MOUTH EVERY MORNING PLEASE MAKE AN APPOINTMENT FOR FUTURE REFILLS        baclofen 10 MG tablet    LIORESAL    90 tablet    Take 1 tablet (10 mg) by mouth 3 times daily as needed for muscle spasms    Cervical myofascial strain, sequela       cetirizine 10 MG tablet    zyrTEC    30 tablet    Take 1 tablet (10 mg) by mouth At Bedtime    Chronic nonallergic rhinitis       EFFEXOR  MG 24 hr capsule   Generic drug:  venlafaxine      Take 150 mg by mouth daily

## 2018-07-30 NOTE — NURSING NOTE
Conor Contreras's goals for this visit include:   Chief Complaint   Patient presents with     Consult       He requests these members of his care team be copied on today's visit information: Yes    PCP: Rocky Yi    Referring Provider:  Rocky Yi MD  [id number] [place] [address]    /66 (BP Location: Left arm, Patient Position: Sitting, Cuff Size: Adult Regular)  Pulse 65  Temp 97.1  F (36.2  C) (Oral)  Resp 20  Wt 92 kg (202 lb 12.8 oz)  SpO2 97%  BMI 27.89 kg/m2    Do you need any medication refills at today's visit? No

## 2018-08-13 ENCOUNTER — THERAPY VISIT (OUTPATIENT)
Dept: PHYSICAL THERAPY | Facility: CLINIC | Age: 48
End: 2018-08-13
Payer: COMMERCIAL

## 2018-08-13 DIAGNOSIS — M47.812 SPONDYLOSIS OF CERVICAL REGION WITHOUT MYELOPATHY OR RADICULOPATHY: Primary | ICD-10-CM

## 2018-08-13 PROCEDURE — 97112 NEUROMUSCULAR REEDUCATION: CPT | Mod: GP | Performed by: PHYSICAL THERAPIST

## 2018-08-13 PROCEDURE — 97161 PT EVAL LOW COMPLEX 20 MIN: CPT | Mod: GP | Performed by: PHYSICAL THERAPIST

## 2018-08-13 PROCEDURE — 97110 THERAPEUTIC EXERCISES: CPT | Mod: GP | Performed by: PHYSICAL THERAPIST

## 2018-08-13 NOTE — PROGRESS NOTES
[facility] Initial Evaluation  Subjective:  Patient is a 48 year old male presenting with rehab cervical spine hpi.   Conor Contreras is a 48 year old male with a cervical spine condition.  Condition occurred with:  Insidious onset (COMPUTER WORK).  Condition occurred: for unknown reasons.  This is a chronic and recurrent condition  MD: 7/24/2018.    Patient reports pain:  Cervical right side and cervical left side (L>R).    Pain is described as aching and burning and is constant and reported as 5/10.  Associated symptoms:  Headache and loss of motion/stiffness. Pain is worse in the A.M. and worse in the P.M..  Symptoms are exacerbated by looking up or down, rotating head and driving and relieved by NSAID's and heat.  Since onset symptoms are unchanged.  Special tests:  X-ray.  Previous treatment: NONE.    General health as reported by patient is good.  Pertinent medical history includes:  Sleep disorder/apnea, chemical dependency, depression and migraines/headaches.  Medical allergies: no.    Current medications:  Anti-depressants.  Current occupation is MENTAL HEALTH THERAPIST.  Patient is working in normal job without restrictions.  Primary job tasks include:  Prolonged sitting (COMPUTER WORK).    Barriers include:  None as reported by the patient.    Red flags:  None as reported by the patient.                        Objective:  System    Physical Exam    [facility] Cervical Evaluation    Posture:  Sitting: fair        Correction of Posture: no effect    Movement Loss:  Protrusion (PRO): pain  Flexion (Flex): pain  Retraction (RET): pain  Extension (EXT): pain and min  Lateral Flexion Right (LF R): pain  Lateral Flexion Left (LF L): pain  Rotation Right (ROT R): nil  Rotation Left (ROT L): mod and pain  Test Movements:    PRO: During: increases      RET: During: centralizing    Repeat RET: During: centralizing and decreases  After: better                            Conclusion:  derangement  Principle of Treatment:  Posture Correction: IN SITTING WITH TOWEL ROLL.     Extension: RETRACTION    Other: NEUTRAL SPINE, THER EX, THER ACT, NMR, MANUAL THERAPY.                                          ROS    Assessment/Plan:    Patient is a 48 year old male with cervical complaints.    Patient has the following significant findings with corresponding treatment plan.                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   Pain -  hot/cold therapy, US, electric stimulation, manual therapy, splint/taping/bracing/orthotics, self management, education, directional preference exercise and home program  Decreased ROM/flexibility - manual therapy, therapeutic exercise, therapeutic activity and home program  Decreased function - therapeutic activities and home program  Impaired posture - neuro re-education, therapeutic activities and home program    Therapy Evaluation Codes:   1) History comprised of:   Personal factors that impact the plan of care:      Past/current experiences.    Comorbidity factors that impact the plan of care are:      None.     Medications impacting care: None.  2) Examination of Body Systems comprised of:   Body structures and functions that impact the plan of care:      Cervical spine.   Activity limitations that impact the plan of care are:      Cooking, Driving, Jumping, Running, Sitting, Sports and Working.  3) Clinical presentation characteristics are:   Stable/Uncomplicated.  4) Decision-Making    Low complexity using standardized patient assessment instrument and/or measureable assessment of functional outcome.  Cumulative Therapy Evaluation is: Low complexity.    Previous and current functional limitations:  (See Goal Flow Sheet for this information)    Short term and Long term goals: (See Goal Flow Sheet for this information)     Communication ability:  Patient appears to be able to clearly  communicate and understand verbal and written communication and follow directions correctly.  Treatment Explanation - The following has been discussed with the patient:   RX ordered/plan of care  Anticipated outcomes  Possible risks and side effects  This patient would benefit from PT intervention to resume normal activities.   Rehab potential is good.    Frequency:  1 X week, once daily  Duration:  for 6 weeks  Discharge Plan:  Achieve all LTG.  Independent in home treatment program.  Reach maximal therapeutic benefit.    Please refer to the daily flowsheet for treatment today, total treatment time and time spent performing 1:1 timed codes.

## 2018-08-13 NOTE — MR AVS SNAPSHOT
After Visit Summary   8/13/2018    Conor Contreras    MRN: [id number]           Patient Information     Date Of Birth          1970        Visit Information        Provider Department      8/13/2018 3:00 PM Jorge Luis Simmons PT [facility]        Today's Diagnoses     Spondylosis of cervical region without myelopathy or radiculopathy    -  1       Follow-ups after your visit        Your next 10 appointments already scheduled     Nov 05, 2018 11:45 AM CST   CT CHEST W/O CONTRAST with MGCT1   [facility]    [address] [place] [id number] [phone]           Please bring any scans or X-rays taken at other hospitals, if similar tests were done. Also bring a list of your medicines, including vitamins, minerals and over-the-counter drugs. It is safest to leave personal items at home.  Be sure to tell your doctor:   If you have any allergies.   If there s any chance you are pregnant.   If you are breastfeeding.  You do not need to do anything special to prepare for this exam.  Please wear loose clothing, such as a sweat suit or jogging clothes. Avoid snaps, zippers and other metal. We may ask you to undress and put on a hospital gown.            Nov 05, 2018  1:00 PM CST   Return Visit with Rene Shaikh MD   [facility])    [address] [place] [id number] [phone]              Who to contact     If you have questions or need follow up information about today's clinic visit or your schedule please contact [facility] directly at [phone].  Normal or non-critical lab and imaging results will be communicated to you by MyChart, letter or phone within 4 business days after the clinic has received the results. If you do not hear from us within 7 days, please contact the clinic through MyChart or phone.  If you have a critical or abnormal lab result, we will notify you by phone as soon as possible.  Submit refill requests through Gamersband or call your pharmacy and they will forward the refill request to us. Please allow 3 business days for your refill to be completed.          Additional Information About Your Visit        AppTaphart Information     Gamers[employer] gives you secure access to your electronic health record. If you see a primary care provider, you can also send messages to your care team and make appointments. If you have questions, please call your primary care clinic.  If you do not have a primary care provider, please call [phone] and they will assist you.        Care EveryWhere ID     This is your Care EveryWhere ID. This could be used by other organizations to access your [facility] medical records  [id number]         Blood Pressure from Last 3 Encounters:   07/30/18 113/66   07/06/18 110/75   05/02/18 121/78    Weight from Last 3 Encounters:   07/30/18 92 kg (202 lb 12.8 oz)   07/06/18 91.6 kg (202 lb)   04/27/18 93 kg (205 lb)              We Performed the Following     [facility] Inital Eval Report     Neuromuscular Re-Education     PT Eval, Low Complexity (410[address])     Therapeutic Exercises        Primary Care Provider Office Phone # Fax #    Rocky Yi -[phone]       [address] [place]  [facility] [address]        Equal Access to Services     KEREN JIMENES : Hadii shefali ku hadasho Sobrice, waaxda luqadaha, qaybta kaalmada bartolo regan . So [facility] [phone].    ATENCIÓN: Si habla español, tiene a jimenez disposición servicios gratuitos de asistencia lingüística. Alejandro al [phone].    We comply with applicable federal civil rights laws and [place] laws. We do not discriminate on the basis of race, color, national origin, age, disability, sex, sexual orientation, or gender identity.            Thank you!     Thank you for choosing [facility]  for your care. Our goal is always to provide you with excellent care. Hearing back from our patients is one way we can continue to improve our services. Please take a few minutes to complete the written survey that you may receive in the mail after your visit with us. Thank you!             Your Updated Medication List - Protect others around you: Learn how to safely use, store and throw away your medicines at www.disposemymeds.org.          This list is accurate as of 8/13/18 11:59 PM.  Always use your most recent med list.                   Brand Name Dispense Instructions for use Diagnosis    amphetamine-dextroamphetamine 15 MG per 24 hr capsule    ADDERALL XR     TAKE ONE CAPSULE BY MOUTH EVERY MORNING PLEASE MAKE AN APPOINTMENT FOR FUTURE REFILLS        baclofen 10 MG tablet    LIORESAL    90 tablet    Take 1 tablet (10 mg) by mouth 3 times daily as needed for muscle spasms    Cervical myofascial strain, sequela       cetirizine 10 MG tablet    zyrTEC    30 tablet    Take 1 tablet (10 mg) by mouth At Bedtime    Chronic nonallergic rhinitis       EFFEXOR  MG 24 hr capsule   Generic drug:  venlafaxine      Take 150 mg by mouth daily

## 2018-10-29 ENCOUNTER — NURSE TRIAGE (OUTPATIENT)
Dept: NURSING | Facility: CLINIC | Age: 48
End: 2018-10-29

## 2018-10-29 ENCOUNTER — OFFICE VISIT (OUTPATIENT)
Dept: FAMILY MEDICINE | Facility: CLINIC | Age: 48
End: 2018-10-29
Payer: COMMERCIAL

## 2018-10-29 VITALS
BODY MASS INDEX: 27.04 KG/M2 | RESPIRATION RATE: 16 BRPM | DIASTOLIC BLOOD PRESSURE: 84 MMHG | WEIGHT: 196.6 LBS | OXYGEN SATURATION: 98 % | TEMPERATURE: 97.8 F | HEART RATE: 67 BPM | SYSTOLIC BLOOD PRESSURE: 130 MMHG

## 2018-10-29 DIAGNOSIS — M54.2 NECK PAIN: ICD-10-CM

## 2018-10-29 DIAGNOSIS — R10.13 ABDOMINAL PAIN, EPIGASTRIC: Primary | ICD-10-CM

## 2018-10-29 LAB
BASOPHILS # BLD AUTO: 0 10E9/L (ref 0–0.2)
BASOPHILS NFR BLD AUTO: 0.4 %
DIFFERENTIAL METHOD BLD: ABNORMAL
EOSINOPHIL # BLD AUTO: 0.2 10E9/L (ref 0–0.7)
EOSINOPHIL NFR BLD AUTO: 4.2 %
ERYTHROCYTE [DISTWIDTH] IN BLOOD BY AUTOMATED COUNT: 12.5 % (ref 10–15)
HCT VFR BLD AUTO: 40.6 % (ref 40–53)
HGB BLD-MCNC: 14.2 G/DL (ref 13.3–17.7)
LYMPHOCYTES # BLD AUTO: 1.2 10E9/L (ref 0.8–5.3)
LYMPHOCYTES NFR BLD AUTO: 22.1 %
MCH RBC QN AUTO: 30.5 PG (ref 26.5–33)
MCHC RBC AUTO-ENTMCNC: 35 G/DL (ref 31.5–36.5)
MCV RBC AUTO: 87 FL (ref 78–100)
MONOCYTES # BLD AUTO: 0.6 10E9/L (ref 0–1.3)
MONOCYTES NFR BLD AUTO: 10.6 %
NEUTROPHILS # BLD AUTO: 3.3 10E9/L (ref 1.6–8.3)
NEUTROPHILS NFR BLD AUTO: 62.7 %
PLATELET # BLD AUTO: 112 10E9/L (ref 150–450)
RBC # BLD AUTO: 4.66 10E12/L (ref 4.4–5.9)
WBC # BLD AUTO: 5.3 10E9/L (ref 4–11)

## 2018-10-29 PROCEDURE — 99214 OFFICE O/P EST MOD 30 MIN: CPT | Performed by: NURSE PRACTITIONER

## 2018-10-29 PROCEDURE — 36415 COLL VENOUS BLD VENIPUNCTURE: CPT | Performed by: NURSE PRACTITIONER

## 2018-10-29 PROCEDURE — 85025 COMPLETE CBC W/AUTO DIFF WBC: CPT | Performed by: NURSE PRACTITIONER

## 2018-10-29 RX ORDER — OMEPRAZOLE 40 MG/1
40 CAPSULE, DELAYED RELEASE ORAL DAILY
Qty: 30 CAPSULE | Refills: 1 | Status: SHIPPED | OUTPATIENT
Start: 2018-10-29 | End: 2019-09-23

## 2018-10-29 RX ORDER — METHOCARBAMOL 750 MG/1
750 TABLET, FILM COATED ORAL
Qty: 20 TABLET | Refills: 0 | Status: SHIPPED | OUTPATIENT
Start: 2018-10-29 | End: 2019-09-23

## 2018-10-29 ASSESSMENT — PAIN SCALES - GENERAL: PAINLEVEL: NO PAIN (0)

## 2018-10-29 NOTE — MR AVS SNAPSHOT
After Visit Summary   10/29/2018    Conor Contreras    MRN: [id number]           Patient Information     Date Of Birth          1970        Visit Information        Provider Department      10/29/2018 11:20 AM Fozia Hilliard APRN CNP [facility]        Today's Diagnoses     Abdominal pain, epigastric    -  1    Neck pain          Care Instructions    For the abdominal pain:  -seems related to too much acid in your stomach  -take Omeprazole 40 mg once daily on empty stomach 30 minutes prior to eating for 2 weeks    If you still have pain after that, we will do further investigation into possible gallbladder etiology.    For neck pain, recommend physical therapy, see contact information below.  Can try the muscle relaxer, Robaxin at night (or when you sleep) for about a week to see if this helps.  Limit ibuprofen, naproxen, aleve, motrin.  Can take tylenol up to 1000 mg three times a day.      At [facility], we strive to deliver an exceptional experience to you, every time we see you.  If you receive a survey in the mail, please send us back your thoughts. We really do value your feedback.    Your care team:                            Family Medicine Internal Medicine   MD Rocky Rodriguez MD Shantel Branch-Fleming, MD Katya Georgiev PA-C Megan Hill, APRN CNP Nam Ho, MD Pediatrics   UNA Humphries CNP Paula Brito, MD Amelia Massimini APRN CNP Shaista Malik, MD Bethany Templen, MD Deborah Mielke, MD Kim Thein, GURINDER [facility]      Clinic hours: Monday - Thursday 7 am-7 pm; Fridays 7 am-5 pm.   Urgent care: Monday - Friday 11 am-9 pm; Saturday and Sunday 9 am-5 pm.  Pharmacy : Monday -Thursday 8 am-8 pm; Friday 8 am-6 pm; Saturday and Sunday 9 am-5 pm.     Clinic: [phone]   Pharmacy: [phone]                Follow-ups after your visit        Additional Services     [facility] PT, HAND, AND CHIROPRACTIC  REFERRAL       Physical Therapy, Hand Therapy and Chiropractic Care are available through:  *[facility]  *Hand Therapy (Occupational Therapy or Physical Therapy)  *[facility]    Call one number to schedule at any of the above locations: [phone].    Physical therapy, Hand therapy and/or Chiropractic care has been recommended by your physician as an excellent treatment option to reduce pain and help people return to normal activities, including sports.  Therapy and/or chiropractic care services are a great complement or alternative to expensive and invasive surgery, injections, or long-term use of prescription medications. The primary goal is to identify the underlying problem and provide you the tools to manage your condition on your own.     Please be aware that coverage of these services is subject to the terms and limitations of your health insurance plan.  Call member services at your health plan with any benefit or coverage questions.      Please bring the following to your appointment:  *Your personal calendar for scheduling future appointments  *Comfortable clothing                  Future tests that were ordered for you today     Open Future Orders        Priority Expected Expires Ordered    [facility] PT, HAND, AND CHIROPRACTIC REFERRAL Routine  10/29/2019 10/29/2018            Who to contact     If you have questions or need follow up information about today's clinic visit or your schedule please contact [facility] directly at [phone].  Normal or non-critical lab and imaging results will be communicated to you by MyChart, letter or phone within 4 business days after the clinic has received the results. If you do not hear from us within 7 days, please contact the clinic through MyChart or phone. If you have a critical or abnormal lab result, we will notify you by phone as soon as possible.  Submit refill requests through Metanautixhart or call  your pharmacy and they will forward the refill request to us. Please allow 3 business days for your refill to be completed.          Additional Information About Your Visit        Buzznihart Information     Care Thread gives you secure access to your electronic health record. If you see a primary care provider, you can also send messages to your care team and make appointments. If you have questions, please call your primary care clinic.  If you do not have a primary care provider, please call [phone] and they will assist you.        Care EveryWhere ID     This is your Care EveryWhere ID. This could be used by other organizations to access your [place] medical records  [id number]        Your Vitals Were     Pulse Temperature Respirations Pulse Oximetry BMI (Body Mass Index)       67 97.8  F (36.6  C) (Oral) 16 98% 27.04 kg/m2        Blood Pressure from Last 3 Encounters:   10/29/18 130/84   07/30/18 113/66   07/06/18 110/75    Weight from Last 3 Encounters:   10/29/18 196 lb 9.6 oz (89.2 kg)   07/30/18 202 lb 12.8 oz (92 kg)   07/06/18 202 lb (91.6 kg)              We Performed the Following     CBC with platelets and differential          Today's Medication Changes          These changes are accurate as of 10/29/18 11:59 AM.  If you have any questions, ask your nurse or doctor.               Start taking these medicines.        Dose/Directions    methocarbamol 750 MG tablet   Commonly known as:  ROBAXIN   Used for:  Neck pain   Started by:  Fozia Hilliard APRN CNP        Dose:  750 mg   Take 1 tablet (750 mg) by mouth nightly as needed for muscle spasms   Quantity:  20 tablet   Refills:  0       omeprazole 40 MG capsule   Commonly known as:  priLOSEC   Used for:  Abdominal pain, epigastric   Started by:  Fozia Hilliard APRN CNP        Dose:  40 mg   Take 1 capsule (40 mg) by mouth daily Take 30-60 minutes before a meal.   Quantity:  30 capsule   Refills:  1            Where to get your  medicines      These medications were sent to [id number] - [place] - [address] [place] AT [facility] [address]  [address] [place] [address]     Phone:  [phone]     methocarbamol 750 MG tablet    omeprazole 40 MG capsule                Primary Care Provider Office Phone # Fax #    Rocky Yi -[phone] [address]  [facility] [address]        Equal Access to Services     [facility]: Hadii aad ku hadasho Soomaali, waaxda luqadaha, qaybta kaalmada adeegyada, waxay idiin hayaan adeeg kharash la'aaalicia . So [facility] [phone].    ATENCIÓN: Si habla español, tiene a jimenez disposición servicios gratuitos de asistencia lingüística. [facility] [phone].    We comply with applicable federal civil rights laws and [place] laws. We do not discriminate on the basis of race, color, national origin, age, disability, sex, sexual orientation, or gender identity.            Thank you!     Thank you for choosing [facility]  for your care. Our goal is always to provide you with excellent care. Hearing back from our patients is one way we can continue to improve our services. Please take a few minutes to complete the written survey that you may receive in the mail after your visit with us. Thank you!             Your Updated Medication List - Protect others around you: Learn how to safely use, store and throw away your medicines at www.disposemymeds.org.          This list is accurate as of 10/29/18 11:59 AM.  Always use your most recent med list.                   Brand Name Dispense Instructions for use Diagnosis    amphetamine-dextroamphetamine 15 MG per 24 hr capsule    ADDERALL XR     TAKE ONE CAPSULE BY MOUTH EVERY MORNING PLEASE MAKE AN APPOINTMENT FOR FUTURE REFILLS        baclofen 10 MG tablet    LIORESAL    90 tablet    Take 1 tablet (10 mg) by mouth 3 times daily as needed for muscle spasms    Cervical myofascial  strain, sequela       cetirizine 10 MG tablet    zyrTEC    30 tablet    Take 1 tablet (10 mg) by mouth At Bedtime    Chronic nonallergic rhinitis       EFFEXOR  MG 24 hr capsule   Generic drug:  venlafaxine      Take 150 mg by mouth daily        methocarbamol 750 MG tablet    ROBAXIN    20 tablet    Take 1 tablet (750 mg) by mouth nightly as needed for muscle spasms    Neck pain       omeprazole 40 MG capsule    priLOSEC    30 capsule    Take 1 capsule (40 mg) by mouth daily Take 30-60 minutes before a meal.    Abdominal pain, epigastric

## 2018-10-29 NOTE — PATIENT INSTRUCTIONS
For the abdominal pain:  -seems related to too much acid in your stomach  -take Omeprazole 40 mg once daily on empty stomach 30 minutes prior to eating for 2 weeks    If you still have pain after that, we will do further investigation into possible gallbladder etiology.    For neck pain, recommend physical therapy, see contact information below.  Can try the muscle relaxer, Robaxin at night (or when you sleep) for about a week to see if this helps.  Limit ibuprofen, naproxen, aleve, motrin.  Can take tylenol up to 1000 mg three times a day.      At [facility], we strive to deliver an exceptional experience to you, every time we see you.  If you receive a survey in the mail, please send us back your thoughts. We really do value your feedback.    Your care team:                            Family Medicine Internal Medicine   MD Rocky Rodriguez MD Shantel Branch-Fleming, MD Katya Georgiev PA-C Megan Hill, APRN CNP    Eusebio Birmingham MD Pediatrics   Sean Hernandez, UNA Hilliard, CNP MD Nargis Arzola APRN CNP   MD Rosalinda Frances MD Deborah Mielke, MD Kim Thein, APRN [facility]      Clinic hours: Monday - Thursday 7 am-7 pm; Fridays 7 am-5 pm.   Urgent care: Monday - Friday 11 am-9 pm; Saturday and Sunday 9 am-5 pm.  Pharmacy : Monday -Thursday 8 am-8 pm; Friday 8 am-6 pm; Saturday and Sunday 9 am-5 pm.     Clinic: [phone]   Pharmacy: [phone]

## 2018-10-29 NOTE — PROGRESS NOTES
SUBJECTIVE:   Conor Contreras is a 48 year old male who presents to clinic today for the following health issues:    Abdominal Pain      Duration: x 2 days    Description (location/character/radiation): upper middle abdomen       Associated flank pain: None    Intensity:  moderate    Accompanying signs and symptoms:        Fever/Chills: no        Gas/Bloating: no        Nausea/vomitting: no        Diarrhea: no        Dysuria or Hematuria: no     History (previous similar pain/trauma/previous testing): none    Precipitating or alleviating factors:       Pain worse with eating/BM/urination: eating       Pain relieved by BM: no     Therapies tried and outcome: None    LMP:  not applicable  Last night was worst after eating  Took ranitidine which helped slightly  Tied to eating or drinking  No history of abdominal surgeries, no history of recent rapid weight loss  Has been tking more Ibuprofen than normal for a few months due to neck pain  Small amount of nausea when pain comes on  Normal bowel movements  No vomiting    Neck pain:  Does a lot of sitting in front of computed.  Feels like tension, denies injury.  Denies shooting pain in to arms or numbness, weakness in upper extremities.        Problem list and histories reviewed & adjusted, as indicated.  Additional history: as documented    Patient Active Problem List   Diagnosis     ADD (attention deficit disorder)     HARLAN (obstructive sleep apnea)     Hyperlipidemia LDL goal <160     Generalized anxiety disorder     Benign skin lesion of face     Dysthymia     Allergic rhinitis     Abnormal CXR perihilar nodes on CT chest     History of drug abuse     Mild intermittent asthma     Palpitations     Mediastinal lymphadenopathy     Spondylosis of cervical region, at C6-C7, without myelopathy or radiculopathy     Cervical myofascial strain, sequela     Past Surgical History:   Procedure Laterality Date     ENDOBRONCHIAL ULTRASOUND FLEXIBLE N/A 1/16/2015    Procedure:  ENDOBRONCHIAL ULTRASOUND FLEXIBLE;  Surgeon: Ramakrishna Williamson MD;  Location:  GI     VASECTOMY         Social History   Substance Use Topics     Smoking status: Former Smoker     Packs/day: 0.50     Years: 10.00     Types: Cigarettes     Quit date: 3/18/2001     Smokeless tobacco: Never Used      Comment: QUIT 9 YEARS AGO     Alcohol use 0.0 oz/week     0 Standard drinks or equivalent per week      Comment: very rarely     Family History   Problem Relation Age of Onset     C.A.D. Maternal Grandfather      Alcohol/Drug Maternal Grandfather       from alcoholism     Cerebrovascular Disease Maternal Grandfather      Lipids Father      Cancer Maternal Grandmother      Pancreatic     Cancer Paternal Grandmother      Lung, may not have been primary     HEART DISEASE Paternal Grandfather      Anxiety Disorder Sister      Anxiety Disorder Sister      Asthma No family hx of      Diabetes No family hx of      Hypertension No family hx of      Breast Cancer No family hx of      Cancer - colorectal No family hx of      Prostate Cancer No family hx of      Connective Tissue Disorder No family hx of      Thyroid Disease No family hx of      LUNG DISEASE Maternal Uncle      Asthma versus sarcoid         Current Outpatient Prescriptions   Medication Sig Dispense Refill     amphetamine-dextroamphetamine (ADDERALL XR) 15 MG per 24 hr capsule TAKE ONE CAPSULE BY MOUTH EVERY MORNING PLEASE MAKE AN APPOINTMENT FOR FUTURE REFILLS  0     cetirizine (ZYRTEC) 10 MG tablet Take 1 tablet (10 mg) by mouth At Bedtime 30 tablet 11     methocarbamol (ROBAXIN) 750 MG tablet Take 1 tablet (750 mg) by mouth nightly as needed for muscle spasms 20 tablet 0     omeprazole (PRILOSEC) 40 MG capsule Take 1 capsule (40 mg) by mouth daily Take 30-60 minutes before a meal. 30 capsule 1     venlafaxine (EFFEXOR XR) 150 MG 24 hr capsule Take 150 mg by mouth daily       baclofen (LIORESAL) 10 MG tablet Take 1 tablet (10 mg) by mouth 3 times daily  as needed for muscle spasms (Patient not taking: Reported on 10/29/2018) 90 tablet 5     BP Readings from Last 3 Encounters:   10/29/18 130/84   07/30/18 113/66   07/06/18 110/75    Wt Readings from Last 3 Encounters:   10/29/18 196 lb 9.6 oz (89.2 kg)   07/30/18 202 lb 12.8 oz (92 kg)   07/06/18 202 lb (91.6 kg)                    Reviewed and updated as needed this visit by clinical staff  Tobacco  Allergies  Meds  Med Hx  Surg Hx  Fam Hx  Soc Hx      Reviewed and updated as needed this visit by Provider  Tobacco  Allergies  Meds  Med Hx  Surg Hx  Fam Hx  Soc Hx        ROS:  Constitutional, HEENT, cardiovascular, pulmonary, GI, , musculoskeletal, neuro, skin, endocrine and psych systems are negative, except as otherwise noted.    OBJECTIVE:     /84 (BP Location: Left arm, Patient Position: Chair, Cuff Size: Adult Large)  Pulse 67  Temp 97.8  F (36.6  C) (Oral)  Resp 16  Wt 196 lb 9.6 oz (89.2 kg)  SpO2 98%  BMI 27.04 kg/m2  Body mass index is 27.04 kg/(m^2).  GENERAL: healthy, alert and no distress  NECK: no adenopathy, no asymmetry, masses, or scars and thyroid normal to palpation  RESP: lungs clear to auscultation - no rales, rhonchi or wheezes  CV: regular rate and rhythm, normal S1 S2, no S3 or S4, no murmur, click or rub, no peripheral edema and peripheral pulses strong  ABDOMEN: mild tenderness epigastric, no organomegaly or masses, bowel sounds normal and negative Ibarra's sign, abdomen soft  MS: no gross musculoskeletal defects noted, no edema  NEURO: Normal strength and tone, mentation intact and speech normal  BACK: no CVA tenderness, no paralumbar tenderness    Diagnostic Test Results:  No results found for this or any previous visit (from the past 24 hour(s)).    ASSESSMENT/PLAN:     1. Abdominal pain, epigastric  Will treat as acid reflux as below x 2 weeks.  I do not suspect gallbladder etiology based on my exam however, if pain persists, can pursue US to rule out other  etiologies.    - omeprazole (PRILOSEC) 40 MG capsule; Take 1 capsule (40 mg) by mouth daily Take 30-60 minutes before a meal.  Dispense: 30 capsule; Refill: 1  - CBC with platelets and differential    2. Neck pain  - [facility] PT, HAND, AND CHIROPRACTIC REFERRAL; Future  - methocarbamol (ROBAXIN) 750 MG tablet; Take 1 tablet (750 mg) by mouth nightly as needed for muscle spasms  Dispense: 20 tablet; Refill: 0    Patient Instructions     For the abdominal pain:  -seems related to too much acid in your stomach  -take Omeprazole 40 mg once daily on empty stomach 30 minutes prior to eating for 2 weeks    If you still have pain after that, we will do further investigation into possible gallbladder etiology.    For neck pain, recommend physical therapy, see contact information below.  Can try the muscle relaxer, Robaxin at night (or when you sleep) for about a week to see if this helps.  Limit ibuprofen, naproxen, aleve, motrin.  Can take tylenol up to 1000 mg three times a day.      At [facility], we strive to deliver an exceptional experience to you, every time we see you.  If you receive a survey in the mail, please send us back your thoughts. We really do value your feedback.    Your care team:                            Family Medicine Internal Medicine   MD Rocky Rodriguez MD Shantel Branch-Fleming, MD Katya Georgiev PA-C Megan Hill, APRN CNP Nam Ho, MD Pediatrics   UNA Humphries, MD Nargis Betancourt APREMMETT CNP   MD Rosalinda Frances MD Deborah Mielke, MD Kim Thein, APREMMETT [facility]      Clinic hours: Monday - Thursday 7 am-7 pm; Fridays 7 am-5 pm.   Urgent care: Monday - Friday 11 am-9 pm; Saturday and Sunday 9 am-5 pm.  Pharmacy : Monday -Thursday 8 am-8 pm; Friday 8 am-6 pm; Saturday and Sunday 9 am-5 pm.     Clinic: [phone]   Pharmacy: [phone]            Fozia Hilliard, GURINDER [facility]  [place]

## 2018-10-29 NOTE — TELEPHONE ENCOUNTER
"Conor is having upper abdominal pain.  It started a couple days ago and was intermittant. It's become constant now since last night.  I recommended, per protocol he be seen within 4 hours.  He agreed. I transferred him to scheduling.    Reason for Disposition    [1] MILD-MODERATE pain AND [2] constant AND [3] present > 2 hours    Additional Information    Negative: Severe difficulty breathing (e.g., struggling for each breath, speaks in single words)    Negative: Shock suspected (e.g., cold/pale/clammy skin, too weak to stand, low BP, rapid pulse)    Negative: Difficult to awaken or acting confused  (e.g., disoriented, slurred speech)    Negative: Passed out (i.e., lost consciousness, collapsed and was not responding)    Negative: Visible sweat on face or sweat dripping down face    Negative: Sounds like a life-threatening emergency to the triager    Negative: [1] SEVERE pain (e.g., excruciating) AND [2] present > 1 hour    Negative: [1] Pain lasts > 10 minutes AND [2] age > 50    Negative: [1] Pain lasts > 10 minutes AND [2] age > 40 AND [3] associated chest, arm, neck, upper back or jaw pain    Negative: [1] Pain lasts > 10 minutes AND [2] age > 35 AND [3] at least one cardiac risk factor (i.e., hypertension, diabetes, obesity, smoker or strong family history of heart disease)    Negative: [1] Pain lasts > 10 minutes AND [2] history of heart disease (i.e., heart attack, bypass surgery, angina, angioplasty, CHF; not just a heart murmur)    Negative: [1] Pain lasts > 10 minutes AND [2] difficulty breathing    Negative: [1] Vomiting AND [2] contains red blood  (Exception: few streaks and only occurred once)    Negative: [1] Vomiting AND [2] contains black (\"coffee ground\") material    Negative: Blood in bowel movements  (Exception: Blood on surface of BM with constipation)    Negative: Black or tarry bowel movements  (Exception: chronic-unchanged  black-grey bowel movements AND is taking iron pills or " Pepto-bismol)    Negative: [1] Pregnant > 24 weeks AND [2] hand or face swelling    Negative: Patient sounds very sick or weak to the triager    Protocols used: ABDOMINAL PAIN - UPPER-ADULT-  Janki REEVES RN [facility]

## 2018-11-05 ENCOUNTER — THERAPY VISIT (OUTPATIENT)
Dept: PHYSICAL THERAPY | Facility: CLINIC | Age: 48
End: 2018-11-05
Payer: COMMERCIAL

## 2018-11-05 DIAGNOSIS — M47.812 SPONDYLOSIS OF CERVICAL REGION WITHOUT MYELOPATHY OR RADICULOPATHY: ICD-10-CM

## 2018-11-05 DIAGNOSIS — M54.2 NECK PAIN: ICD-10-CM

## 2018-11-05 PROCEDURE — 97110 THERAPEUTIC EXERCISES: CPT | Mod: GP | Performed by: PHYSICAL THERAPIST

## 2018-11-05 PROCEDURE — 97140 MANUAL THERAPY 1/> REGIONS: CPT | Mod: GP | Performed by: PHYSICAL THERAPIST

## 2018-11-05 PROCEDURE — 97112 NEUROMUSCULAR REEDUCATION: CPT | Mod: GP | Performed by: PHYSICAL THERAPIST

## 2018-11-05 NOTE — PROGRESS NOTES
Subjective:  HPI                    Objective:  System    Physical Exam    General     ROS    Assessment/Plan:    PROGRESS  REPORT    Progress reporting period is from 8/13/2018 to 11/5/2018.     SUBJECTIVE  Subjective: STARTED TAKING CBD OIL.  NO BETTER.  MAYBE WORSE.   WORSE WITH LOOKING DOWN.    Current Pain level: 4/10   Initial Pain level: 5/10   Changes in function: Yes, see goal flow sheet for change in function    ;   ,     The subjective and objective information are from the last SOAP note on this patient.    OBJECTIVE  Objective: Cv FLEX: L Cv PAIN. Cv EXT: MILD LIMITATION: CENTRALIZING.   Cv ROTATION: MILD LIMITATION L.       ASSESSMENT/PLAN  Updated problem list and treatment plan: Diagnosis 1:  SPONDYLOSIS OF CERVICAL REGION @ C6-C7  WITHOUT  MYELOPATHY OR RADICULOPATHY, CERVICAL MYOFASCIAL STRAIN.  Pain -  hot/cold therapy, US, electric stimulation, mechanical traction, manual therapy, splint/taping/bracing/orthotics, self management, education, directional preference exercise and home program  Decreased ROM/flexibility - manual therapy, therapeutic exercise, therapeutic activity and home program  Decreased function - therapeutic activities and home program  Impaired posture - neuro re-education, therapeutic activities and home program  STG/LTGs have been met or progress has been made towards goals:  Yes (See Goal flow sheet completed today.)  Assessment of Progress: The patient's condition is improving.  Self Management Plans:  Patient has been instructed in a home treatment program.  I have re-evaluated this patient and find that the nature, scope, duration and intensity of the therapy is appropriate for the medical condition of the patient.  Conor continues to require the following intervention to meet STG and LTG's: PT  CONTINUE PT    Recommendations:  This patient would benefit from continued therapy.     Frequency:  1 X week, once daily  Duration:  for 4 weeks        Please refer to the daily  flowsheet for treatment today, total treatment time and time spent performing 1:1 timed codes.

## 2018-11-05 NOTE — MR AVS SNAPSHOT
After Visit Summary   11/5/2018    Conor Contreras    MRN: [id number]           Patient Information     Date Of Birth          1970        Visit Information        Provider Department      11/5/2018 2:20 PM Jorge Luis Simmons, PT [facility]        Today's Diagnoses     Neck pain        Spondylosis of cervical region without myelopathy or radiculopathy           Follow-ups after your visit        Your next 10 appointments already scheduled     Nov 12, 2018  1:40 PM CST   [facility] Spine with Jorge Luis Simmons, PT   [facility] ([facility] [place]  )    [address]  [place] [address]   [phone]            Nov 19, 2018  2:20 PM CST   [facility] with Jorge Luis Simmons PT   [facility]    [address]  [place] [address]   [phone]            Nov 26, 2018  2:20 PM CST   [facility] Sp[facility] with Jorge Luis Simmons PT   [facility] ([facility] [place]  )    [address]  [place] [address]   [phone]            Dec 03, 2018  2:20 PM CST   [facility] with Jorge Luis Simmons PT   [facility]    [address] [place]  [facility] [address]   [phone]              Who to contact     If you have questions or need follow up information about today's clinic visit or your schedule please contact [facility] directly at [phone].  Normal or non-critical lab and imaging results will be communicated to you by MyChart, letter or phone within 4 business days after the clinic has received the results. If you do not hear from us within 7 days, please contact the clinic through MyChart or phone. If you have a critical or abnormal lab result, we will notify you by phone as soon as possible.  Submit refill requests through [facility] or call  your pharmacy and they will forward the refill request to us. Please allow 3 business days for your refill to be completed.          Additional Information About Your Visit        MyChart Information     nodilahart gives you secure access to your electronic health record. If you see a primary care provider, you can also send messages to your care team and make appointments. If you have questions, please call your primary care clinic.  If you do not have a primary care provider, please call [phone] and they will assist you.        Care EveryWhere ID     This is your Care EveryWhere ID. This could be used by other organizations to access your [place] medical records  [id number]         Blood Pressure from Last 3 Encounters:   10/29/18 130/84   07/30/18 113/66   07/06/18 110/75    Weight from Last 3 Encounters:   10/29/18 89.2 kg (196 lb 9.6 oz)   07/30/18 92 kg (202 lb 12.8 oz)   07/06/18 91.6 kg (202 lb)              We Performed the Following     [facility] Progress Notes Report     [facility] PT, HAND, AND CHIROPRACTIC REFERRAL     Manual Ther Tech, 1+Regions, EA 15 min     Neuromuscular Re-Education     Therapeutic Exercises        Primary Care Provider Office Phone # Fax #    Rocky Yi [phone]       [address]  [facility] [address]        Equal Access to Services     KEREN JIMENES : Hadii aad ku hadasho Soomaali, waaxda luqadaha, qaybta kaalmada adeegyada, waxay tala haybrett owens . So [facility] [phone].    ATENCIÓN: Si habla español, tiene a jimenez disposición servicios gratuitos de asistencia lingüística. Llame al [phone].    We comply with applicable federal civil rights laws and [place] laws. We do not discriminate on the basis of race, color, national origin, age, disability, sex, sexual orientation, or gender identity.            Thank you!     Thank you for choosing [facility]  for your care. Our goal is always to provide  you with excellent care. Hearing back from our patients is one way we can continue to improve our services. Please take a few minutes to complete the written survey that you may receive in the mail after your visit with us. Thank you!             Your Updated Medication List - Protect others around you: Learn how to safely use, store and throw away your medicines at www.disposemymeds.org.          This list is accurate as of 11/5/18  5:53 PM.  Always use your most recent med list.                   Brand Name Dispense Instructions for use Diagnosis    amphetamine-dextroamphetamine 15 MG per 24 hr capsule    ADDERALL XR     TAKE ONE CAPSULE BY MOUTH EVERY MORNING PLEASE MAKE AN APPOINTMENT FOR FUTURE REFILLS        baclofen 10 MG tablet    LIORESAL    90 tablet    Take 1 tablet (10 mg) by mouth 3 times daily as needed for muscle spasms    Cervical myofascial strain, sequela       cetirizine 10 MG tablet    zyrTEC    30 tablet    Take 1 tablet (10 mg) by mouth At Bedtime    Chronic nonallergic rhinitis       EFFEXOR  MG 24 hr capsule   Generic drug:  venlafaxine      Take 150 mg by mouth daily        methocarbamol 750 MG tablet    ROBAXIN    20 tablet    Take 1 tablet (750 mg) by mouth nightly as needed for muscle spasms    Neck pain       omeprazole 40 MG capsule    priLOSEC    30 capsule    Take 1 capsule (40 mg) by mouth daily Take 30-60 minutes before a meal.    Abdominal pain, epigastric

## 2018-11-12 ENCOUNTER — THERAPY VISIT (OUTPATIENT)
Dept: PHYSICAL THERAPY | Facility: CLINIC | Age: 48
End: 2018-11-12
Payer: COMMERCIAL

## 2018-11-12 DIAGNOSIS — M47.812 SPONDYLOSIS OF CERVICAL REGION WITHOUT MYELOPATHY OR RADICULOPATHY: ICD-10-CM

## 2018-11-12 PROCEDURE — 97140 MANUAL THERAPY 1/> REGIONS: CPT | Mod: GP | Performed by: PHYSICAL THERAPIST

## 2018-11-12 PROCEDURE — 97035 APP MDLTY 1+ULTRASOUND EA 15: CPT | Mod: GP | Performed by: PHYSICAL THERAPIST

## 2018-11-12 PROCEDURE — 97110 THERAPEUTIC EXERCISES: CPT | Mod: GP | Performed by: PHYSICAL THERAPIST

## 2018-11-19 ENCOUNTER — THERAPY VISIT (OUTPATIENT)
Dept: PHYSICAL THERAPY | Facility: CLINIC | Age: 48
End: 2018-11-19
Payer: COMMERCIAL

## 2018-11-19 DIAGNOSIS — M47.812 SPONDYLOSIS OF CERVICAL REGION WITHOUT MYELOPATHY OR RADICULOPATHY: ICD-10-CM

## 2018-11-19 PROCEDURE — 97140 MANUAL THERAPY 1/> REGIONS: CPT | Mod: GP | Performed by: PHYSICAL THERAPIST

## 2018-11-19 PROCEDURE — 97110 THERAPEUTIC EXERCISES: CPT | Mod: GP | Performed by: PHYSICAL THERAPIST

## 2018-11-26 ENCOUNTER — THERAPY VISIT (OUTPATIENT)
Dept: PHYSICAL THERAPY | Facility: CLINIC | Age: 48
End: 2018-11-26
Payer: COMMERCIAL

## 2018-11-26 DIAGNOSIS — M47.812 SPONDYLOSIS OF CERVICAL REGION WITHOUT MYELOPATHY OR RADICULOPATHY: ICD-10-CM

## 2018-11-26 PROCEDURE — 97110 THERAPEUTIC EXERCISES: CPT | Mod: GP | Performed by: PHYSICAL THERAPIST

## 2018-11-26 PROCEDURE — 97140 MANUAL THERAPY 1/> REGIONS: CPT | Mod: GP | Performed by: PHYSICAL THERAPIST

## 2018-11-26 PROCEDURE — 97112 NEUROMUSCULAR REEDUCATION: CPT | Mod: GP | Performed by: PHYSICAL THERAPIST

## 2018-12-03 ENCOUNTER — THERAPY VISIT (OUTPATIENT)
Dept: PHYSICAL THERAPY | Facility: CLINIC | Age: 48
End: 2018-12-03
Payer: COMMERCIAL

## 2018-12-03 DIAGNOSIS — M47.812 SPONDYLOSIS OF CERVICAL REGION WITHOUT MYELOPATHY OR RADICULOPATHY: ICD-10-CM

## 2018-12-03 PROCEDURE — 97112 NEUROMUSCULAR REEDUCATION: CPT | Mod: GP | Performed by: PHYSICAL THERAPIST

## 2018-12-03 PROCEDURE — 97140 MANUAL THERAPY 1/> REGIONS: CPT | Mod: GP | Performed by: PHYSICAL THERAPIST

## 2018-12-03 PROCEDURE — 97012 MECHANICAL TRACTION THERAPY: CPT | Mod: GP | Performed by: PHYSICAL THERAPIST

## 2018-12-03 PROCEDURE — 97110 THERAPEUTIC EXERCISES: CPT | Mod: GP | Performed by: PHYSICAL THERAPIST

## 2019-01-11 ENCOUNTER — THERAPY VISIT (OUTPATIENT)
Dept: PHYSICAL THERAPY | Facility: CLINIC | Age: 49
End: 2019-01-11
Payer: COMMERCIAL

## 2019-01-11 DIAGNOSIS — M47.812 SPONDYLOSIS OF CERVICAL REGION WITHOUT MYELOPATHY OR RADICULOPATHY: ICD-10-CM

## 2019-01-11 PROCEDURE — 97012 MECHANICAL TRACTION THERAPY: CPT | Mod: GP | Performed by: PHYSICAL THERAPIST

## 2019-01-11 PROCEDURE — 97112 NEUROMUSCULAR REEDUCATION: CPT | Mod: GP | Performed by: PHYSICAL THERAPIST

## 2019-01-11 PROCEDURE — 97110 THERAPEUTIC EXERCISES: CPT | Mod: GP | Performed by: PHYSICAL THERAPIST

## 2019-02-08 ENCOUNTER — TRANSFERRED RECORDS (OUTPATIENT)
Dept: HEALTH INFORMATION MANAGEMENT | Facility: CLINIC | Age: 49
End: 2019-02-08

## 2019-03-18 ENCOUNTER — OFFICE VISIT (OUTPATIENT)
Dept: PULMONOLOGY | Facility: CLINIC | Age: 49
End: 2019-03-18
Payer: COMMERCIAL

## 2019-03-18 ENCOUNTER — ANCILLARY PROCEDURE (OUTPATIENT)
Dept: CT IMAGING | Facility: CLINIC | Age: 49
End: 2019-03-18
Attending: INTERNAL MEDICINE
Payer: COMMERCIAL

## 2019-03-18 VITALS
OXYGEN SATURATION: 97 % | SYSTOLIC BLOOD PRESSURE: 107 MMHG | TEMPERATURE: 96.8 F | BODY MASS INDEX: 27.17 KG/M2 | DIASTOLIC BLOOD PRESSURE: 73 MMHG | HEIGHT: 72 IN | WEIGHT: 200.6 LBS | RESPIRATION RATE: 16 BRPM | HEART RATE: 77 BPM

## 2019-03-18 DIAGNOSIS — R91.8 PULMONARY NODULES: Primary | ICD-10-CM

## 2019-03-18 DIAGNOSIS — R91.8 PULMONARY NODULES: ICD-10-CM

## 2019-03-18 PROCEDURE — 99214 OFFICE O/P EST MOD 30 MIN: CPT | Performed by: INTERNAL MEDICINE

## 2019-03-18 PROCEDURE — 71250 CT THORAX DX C-: CPT | Performed by: RADIOLOGY

## 2019-03-18 ASSESSMENT — PAIN SCALES - GENERAL: PAINLEVEL: NO PAIN (0)

## 2019-03-18 ASSESSMENT — MIFFLIN-ST. JEOR: SCORE: 1799.98

## 2019-03-18 NOTE — PROGRESS NOTES
LUNG NODULE & INTERVENTIONAL PULMONARY CLINIC  [facility]     Conor Contreras MRN# [id number]   Age: 49 year old YOB: 1970     Reason for Consultation: lung nodule(s)     Assessment and Plan:    1. Established multiple pulmonary lung nodule(s). Given the characteristics on current/previous imaging and risk factors; I would classify this to be Low (<6%) risk for cancer. Has numerous bilateral central calcified nodules in addition to calcified LN's. Although previous bronchoscopy was non-diagnostic, I would favor benign etiology including sarcoidosis vs granulomatous disease. I discussed role for repeat bronch and cryo biopsies, however he would like to defer at this time. In my view, I don't see any interval changes in the nodules. Will await official report however. At this point, I would opt him out of surveillance CT and have him f/u in ILD clinic with repeat PFT for further evaluation.     Billing: I spent more than 30 minutes face to face and greater than 50% of time was for counseling and coordination of care about the issues above.    Rene Shaikh MD   of Medicine  Interventional Pulmonology  Department of Pulmonary, Allergy, Critical Care and Sleep Medicine   Formerly [facility]  Pager: [phone]          History:     Conor Contreras is a 48 year old male with sig h/o for HARLAN, anxiety, ADD who is here for evaluation/followup of lung nodule(s).     - No new resp sx or complaints. Denies dyspnea or cough.   - CT chest performed in 2014 showing bilateral calcified nodules and mediastinal/hilar LAD. He had EBUS-TBNA in 2015 which was non-diagnostic with regard to FNA of LN. At that time, cultures were negative. Ig subclasses were unremarkable. CD4:CD8 ratio was 0.9. Airway exam was unremarkable.   - Personal hx of cancer: no  - Family hx of cancer: no  - Exposure hx: Denies asbestos or radon  exposure   - Tobacco hx: 1/2ppd x5yrs. Quit 20yrs ago.   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 Has 4 cats and 1 dog. No allergies.   - My interpretation of the images relevant for this visit includes: RUL and RLL calcified nodules have increased in size since . Otherwise numerable bilateral calcified nodules and LAD are stable.    - My interpretation of the PFT's relevant for this visit includes: Normal     Culprit Nodule(s):   Multiple bilateral lung nodule(s) that are sub 8 mm. First seen by chest CT on .  No interval change.      Other active medical problems include:   - has HARLAN. Uses CPAP.    - has anxiety and ADD. stable           Past Medical History:      Past Medical History:   Diagnosis Date     ADD (attention deficit disorder)      Dysthymia      Elevated lipids      Generalised anxiety disorder 2012     Lung nodules      HARLAN (obstructive sleep apnea) 2011    cpap at night           Past Surgical History:      Past Surgical History:   Procedure Laterality Date     ENDOBRONCHIAL ULTRASOUND FLEXIBLE N/A 2015    Procedure: ENDOBRONCHIAL ULTRASOUND FLEXIBLE;  Surgeon: Ramakrishna Williamson MD;  Location:  GI     VASECTOMY            Social History:     Social History     Tobacco Use     Smoking status: Former Smoker     Packs/day: 0.50     Years: 10.00     Pack years: 5.00     Types: Cigarettes     Last attempt to quit: 3/18/2001     Years since quittin.0     Smokeless tobacco: Never Used     Tobacco comment: QUIT 9 YEARS AGO   Substance Use Topics     Alcohol use: Yes     Alcohol/week: 0.0 oz     Comment: very rarely          Family History:     Family History   Problem Relation Age of Onset     C.A.D. Maternal Grandfather      Alcohol/Drug Maternal Grandfather          from alcoholism     Cerebrovascular Disease Maternal Grandfather      Lipids Father      Cancer Maternal Grandmother         Pancreatic     Cancer Paternal Grandmother         Lung, may not  have been primary     Heart Disease Paternal Grandfather      Anxiety Disorder Sister      Anxiety Disorder Sister      Asthma No family hx of      Diabetes No family hx of      Hypertension No family hx of      Breast Cancer No family hx of      Cancer - colorectal No family hx of      Prostate Cancer No family hx of      Connective Tissue Disorder No family hx of      Thyroid Disease No family hx of      LUNG DISEASE Maternal Uncle         Asthma versus sarcoid           Allergies:    No Known Allergies       Medications:     Current Outpatient Medications   Medication Sig     amphetamine-dextroamphetamine (ADDERALL XR) 15 MG per 24 hr capsule TAKE ONE CAPSULE BY MOUTH EVERY MORNING PLEASE MAKE AN APPOINTMENT FOR FUTURE REFILLS     baclofen (LIORESAL) 10 MG tablet Take 1 tablet (10 mg) by mouth 3 times daily as needed for muscle spasms     cetirizine (ZYRTEC) 10 MG tablet Take 1 tablet (10 mg) by mouth At Bedtime     venlafaxine (EFFEXOR XR) 150 MG 24 hr capsule Take 150 mg by mouth daily     methocarbamol (ROBAXIN) 750 MG tablet Take 1 tablet (750 mg) by mouth nightly as needed for muscle spasms (Patient not taking: Reported on 3/18/2019)     omeprazole (PRILOSEC) 40 MG capsule Take 1 capsule (40 mg) by mouth daily Take 30-60 minutes before a meal. (Patient not taking: Reported on 3/18/2019)     No current facility-administered medications for this visit.           Review of Systems:     CONSTITUTIONAL: negative for fever, chills, change in weight  INTEGUMENTARY/SKIN: no rash or obvious new lesions  ENT/MOUTH: no sore throat, new sinus pain or nasal drainage  RESP: see interval history  CV: negative for chest pain, palpitations or peripheral edema  GI: no nausea, vomiting, change in stools  : no dysuria  MUSCULOSKELETAL: no myalgias, arthralgias  ENDOCRINE: blood sugars with adequate control  PSYCHIATRIC: mood stable  LYMPHATIC: no new lymphadenopathy  HEME: no bleeding or easy bruisability  NEURO: no numbness,  weakness, headaches         Physical Exam:     Temp:  [96.8  F (36  C)] 96.8  F (36  C)  Pulse:  [77] 77  Resp:  [16] 16  BP: (107)/(73) 107/73  SpO2:  [97 %] 97 %  Wt Readings from Last 4 Encounters:   03/18/19 91 kg (200 lb 9.6 oz)   10/29/18 89.2 kg (196 lb 9.6 oz)   07/30/18 92 kg (202 lb 12.8 oz)   07/06/18 91.6 kg (202 lb)     Constitutional:   Awake, alert and in no apparent distress     Eyes:   Nonicteric, LISBETH     ENT:    Trachea is midline. No gross neck abnormalities      Neck:   Supple without supraclavicular or cervical lymphadenopathy     Lungs:   Good air flow.  No crackles. No rhonchi.  No wheezes.     Cardiovascular:   Normal S1 and S2.  RRR.  No murmur, gallop or rub.  Radial, DP and PT pulses normal and symmetric     Abdomen:   NABS, soft, nontender, nondistended.  No HSM.     Musculoskeletal:   No edema.      Neurologic:   Alert and conversant. Cranial nerves  intact.       Skin:   Warm, dry.  No rash on limited exam.           Current Laboratory Data:   All laboratory and imaging data reviewed.    No results found for this or any previous visit (from the past 24 hour(s)).         Previous Pulmonary Function Testing     FVC-Pred   Date Value Ref Range Status   01/12/2015 5.20 L      FVC-Pre   Date Value Ref Range Status   01/12/2015 4.92 L      FVC-%Pred-Pre   Date Value Ref Range Status   01/12/2015 94 %      FEV1-Pre   Date Value Ref Range Status   01/12/2015 3.91 L      FEV1-%Pred-Pre   Date Value Ref Range Status   01/12/2015 94 %      FEV1FVC-Pred   Date Value Ref Range Status   01/12/2015 80 %      FEV1FVC-Pre   Date Value Ref Range Status   01/12/2015 79 %      No results found for: 20029  FEFMax-Pred   Date Value Ref Range Status   01/12/2015 10.11 L/sec      FEFMax-Pre   Date Value Ref Range Status   01/12/2015 7.89 L/sec      FEFMax-%Pred-Pre   Date Value Ref Range Status   01/12/2015 78 %      ExpTime-Pre   Date Value Ref Range Status   01/12/2015 7.95 sec      FIFMax-Pre   Date Value  Ref Range Status   01/12/2015 3.45 L/sec      FEV1FEV6-Pred   Date Value Ref Range Status   01/12/2015 81 %      FEV1FEV6-Pre   Date Value Ref Range Status   01/12/2015 80 %             Previous Cardiology Imaging   No results found for this or any previous visit (from the past 8760 hour(s)).

## 2019-07-03 ENCOUNTER — TELEPHONE (OUTPATIENT)
Dept: PULMONOLOGY | Facility: CLINIC | Age: 49
End: 2019-07-03

## 2019-07-03 DIAGNOSIS — J84.9 ILD (INTERSTITIAL LUNG DISEASE) (H): Primary | ICD-10-CM

## 2019-07-03 NOTE — TELEPHONE ENCOUNTER
Voice mail left for patient regarding ILD consultation with Dr. Perlman.    Patient has been scheduled in [place] for pulmonary function testing on Thursday 08/01/2019 @ 0800. CT scan scheduled same day @ 0900 and ILD consult with Dr. Perlman has been scheduled at 0930.    Encouraged call back to the clinic if times/dates of appointments do not work out in order to get rescheduled.    Appointment reminder letters mailed to pt.    Brooklyn Simon LPN    Rheumatology / Pulmonology  [facility]

## 2019-07-19 ENCOUNTER — TELEPHONE (OUTPATIENT)
Dept: FAMILY MEDICINE | Facility: CLINIC | Age: 49
End: 2019-07-19

## 2019-07-19 NOTE — TELEPHONE ENCOUNTER
Panel Management Review   One phone call and send letter if unable to reach them or MyChart message and send letter if not read after 2 weeks (You will get a message to your inbasket)      BP Readings from Last 1 Encounters:   03/18/19 107/73    , No results found for: A1C, Visit date not found    Health Maintenance Due   Topic Date Due     DEPRESSION ACTION PLAN  1970     COLONOSCOPY  03/07/1979     HIV SCREENING  03/07/1984     ASTHMA ACTION PLAN  11/07/2015     PREVENTIVE CARE VISIT  01/25/2017     ASTHMA CONTROL TEST  10/27/2018     PHQ-9  10/27/2018     ZOSTER IMMUNIZATION (1 of 2) 03/07/2019        Fail List measure: Asthma and depression         Patient is due/failing the following:   ACT and PHQ9    Action needed:   Patient needs to do ACT. and Patient needs to do PHQ9.    Type of outreach:    Sent Vator.TVhart message.    Questions for provider review:    None                                                                                       Chart routed to n/a Cristian Jordan

## 2019-07-29 ASSESSMENT — PATIENT HEALTH QUESTIONNAIRE - PHQ9: SUM OF ALL RESPONSES TO PHQ QUESTIONS 1-9: 0

## 2019-07-29 NOTE — TELEPHONE ENCOUNTER
Updated PHQ9.     Patient stated that he has been trying to get the Dx of asthma off of his chart and off of his problem list. Patient states that it was a misdiagnosis and never had asthma. Routing to PCP to advise. Please take off of problem list and change Dx if possible. When finished please route back to me.       Marilyn DE LA ROSA

## 2019-07-29 NOTE — TELEPHONE ENCOUNTER
Reason for call:  Other   Patient called regarding (reason for call): call back  Additional comments: Patient called back 7/29/19    Phone number to reach patient:  Home number on file [phone] (home)    Best Time:  Any    Can we leave a detailed message on this number?  YES

## 2019-07-29 NOTE — TELEPHONE ENCOUNTER
This writer attempted to contact patient on 07/29/19      Reason for call ACT and left message.      If patient calls back:   1st floor [facility] Care Team (MA/TC) called. Inform patient that someone from the team will contact them, document that pt called and route to care team.         Marilyn Jordan

## 2019-07-30 NOTE — TELEPHONE ENCOUNTER
Asthma is off of the problem list but will still be flagging for an ACT until 2 years from the last time the Dx for asthma was used. Last time it was used was 04/27/2018.

## 2019-08-01 ENCOUNTER — MYC MEDICAL ADVICE (OUTPATIENT)
Dept: FAMILY MEDICINE | Facility: CLINIC | Age: 49
End: 2019-08-01

## 2019-09-23 ENCOUNTER — OFFICE VISIT (OUTPATIENT)
Dept: FAMILY MEDICINE | Facility: CLINIC | Age: 49
End: 2019-09-23
Payer: COMMERCIAL

## 2019-09-23 VITALS
HEIGHT: 72 IN | WEIGHT: 205 LBS | BODY MASS INDEX: 27.77 KG/M2 | OXYGEN SATURATION: 96 % | DIASTOLIC BLOOD PRESSURE: 79 MMHG | TEMPERATURE: 97.7 F | SYSTOLIC BLOOD PRESSURE: 115 MMHG | RESPIRATION RATE: 16 BRPM | HEART RATE: 68 BPM

## 2019-09-23 DIAGNOSIS — Z00.00 ROUTINE GENERAL MEDICAL EXAMINATION AT A HEALTH CARE FACILITY: Primary | ICD-10-CM

## 2019-09-23 DIAGNOSIS — Z12.5 SCREENING FOR PROSTATE CANCER: ICD-10-CM

## 2019-09-23 DIAGNOSIS — Z23 NEED FOR PROPHYLACTIC VACCINATION AND INOCULATION AGAINST INFLUENZA: ICD-10-CM

## 2019-09-23 DIAGNOSIS — G43.019 INTRACTABLE MIGRAINE WITHOUT AURA AND WITHOUT STATUS MIGRAINOSUS: ICD-10-CM

## 2019-09-23 DIAGNOSIS — Z12.11 SCREEN FOR COLON CANCER: ICD-10-CM

## 2019-09-23 DIAGNOSIS — Z13.6 CARDIOVASCULAR SCREENING; LDL GOAL LESS THAN 130: ICD-10-CM

## 2019-09-23 DIAGNOSIS — R11.0 NAUSEA: ICD-10-CM

## 2019-09-23 LAB
ALBUMIN SERPL-MCNC: 3.8 G/DL (ref 3.4–5)
ALP SERPL-CCNC: 121 U/L (ref 40–150)
ALT SERPL W P-5'-P-CCNC: 50 U/L (ref 0–70)
ANION GAP SERPL CALCULATED.3IONS-SCNC: 6 MMOL/L (ref 3–14)
AST SERPL W P-5'-P-CCNC: 31 U/L (ref 0–45)
BASOPHILS # BLD AUTO: 0 10E9/L (ref 0–0.2)
BASOPHILS NFR BLD AUTO: 0.2 %
BILIRUB SERPL-MCNC: 0.3 MG/DL (ref 0.2–1.3)
BUN SERPL-MCNC: 16 MG/DL (ref 7–30)
CALCIUM SERPL-MCNC: 9.1 MG/DL (ref 8.5–10.1)
CHLORIDE SERPL-SCNC: 103 MMOL/L (ref 94–109)
CHOLEST SERPL-MCNC: 179 MG/DL
CO2 SERPL-SCNC: 28 MMOL/L (ref 20–32)
CREAT SERPL-MCNC: 0.88 MG/DL (ref 0.66–1.25)
DIFFERENTIAL METHOD BLD: ABNORMAL
EOSINOPHIL # BLD AUTO: 0.3 10E9/L (ref 0–0.7)
EOSINOPHIL NFR BLD AUTO: 5.9 %
ERYTHROCYTE [DISTWIDTH] IN BLOOD BY AUTOMATED COUNT: 12.7 % (ref 10–15)
GFR SERPL CREATININE-BSD FRML MDRD: >90 ML/MIN/{1.73_M2}
GLUCOSE SERPL-MCNC: 93 MG/DL (ref 70–99)
HCT VFR BLD AUTO: 41.5 % (ref 40–53)
HDLC SERPL-MCNC: 39 MG/DL
HGB BLD-MCNC: 14.2 G/DL (ref 13.3–17.7)
LDLC SERPL CALC-MCNC: 124 MG/DL
LYMPHOCYTES # BLD AUTO: 1.4 10E9/L (ref 0.8–5.3)
LYMPHOCYTES NFR BLD AUTO: 26.8 %
MCH RBC QN AUTO: 30.1 PG (ref 26.5–33)
MCHC RBC AUTO-ENTMCNC: 34.2 G/DL (ref 31.5–36.5)
MCV RBC AUTO: 88 FL (ref 78–100)
MONOCYTES # BLD AUTO: 0.6 10E9/L (ref 0–1.3)
MONOCYTES NFR BLD AUTO: 12 %
NEUTROPHILS # BLD AUTO: 2.8 10E9/L (ref 1.6–8.3)
NEUTROPHILS NFR BLD AUTO: 55.1 %
NONHDLC SERPL-MCNC: 140 MG/DL
PLATELET # BLD AUTO: 113 10E9/L (ref 150–450)
POTASSIUM SERPL-SCNC: 4 MMOL/L (ref 3.4–5.3)
PROT SERPL-MCNC: 8.1 G/DL (ref 6.8–8.8)
RBC # BLD AUTO: 4.72 10E12/L (ref 4.4–5.9)
SODIUM SERPL-SCNC: 137 MMOL/L (ref 133–144)
TRIGL SERPL-MCNC: 79 MG/DL
WBC # BLD AUTO: 5.1 10E9/L (ref 4–11)

## 2019-09-23 PROCEDURE — 80053 COMPREHEN METABOLIC PANEL: CPT | Performed by: INTERNAL MEDICINE

## 2019-09-23 PROCEDURE — 99213 OFFICE O/P EST LOW 20 MIN: CPT | Mod: 25 | Performed by: INTERNAL MEDICINE

## 2019-09-23 PROCEDURE — G0103 PSA SCREENING: HCPCS | Performed by: INTERNAL MEDICINE

## 2019-09-23 PROCEDURE — 99396 PREV VISIT EST AGE 40-64: CPT | Mod: 25 | Performed by: INTERNAL MEDICINE

## 2019-09-23 PROCEDURE — 36415 COLL VENOUS BLD VENIPUNCTURE: CPT | Performed by: INTERNAL MEDICINE

## 2019-09-23 PROCEDURE — 85025 COMPLETE CBC W/AUTO DIFF WBC: CPT | Performed by: INTERNAL MEDICINE

## 2019-09-23 PROCEDURE — 90471 IMMUNIZATION ADMIN: CPT | Performed by: INTERNAL MEDICINE

## 2019-09-23 PROCEDURE — 90686 IIV4 VACC NO PRSV 0.5 ML IM: CPT | Performed by: INTERNAL MEDICINE

## 2019-09-23 PROCEDURE — 80061 LIPID PANEL: CPT | Performed by: INTERNAL MEDICINE

## 2019-09-23 RX ORDER — ELETRIPTAN HYDROBROMIDE 20 MG/1
20 TABLET, FILM COATED ORAL
Qty: 30 TABLET | Refills: 5 | Status: SHIPPED | OUTPATIENT
Start: 2019-09-23 | End: 2020-10-28

## 2019-09-23 RX ORDER — METOCLOPRAMIDE HYDROCHLORIDE 5 MG/1
1 TABLET, ORALLY DISINTEGRATING ORAL 4 TIMES DAILY PRN
Qty: 30 TABLET | Refills: 11 | Status: SHIPPED | OUTPATIENT
Start: 2019-09-23 | End: 2020-10-28

## 2019-09-23 ASSESSMENT — MIFFLIN-ST. JEOR: SCORE: 1819.93

## 2019-09-23 ASSESSMENT — PAIN SCALES - GENERAL: PAINLEVEL: NO PAIN (1)

## 2019-09-23 ASSESSMENT — PATIENT HEALTH QUESTIONNAIRE - PHQ9: SUM OF ALL RESPONSES TO PHQ QUESTIONS 1-9: 2

## 2019-09-23 NOTE — PROGRESS NOTES
SUBJECTIVE:   CC: Conor Contreras is an 49 year old male who presents for preventive health visit.     Healthy Habits:    Do you get at least three servings of calcium containing foods daily (dairy, green leafy vegetables, etc.)? yes    Amount of exercise or daily activities, outside of work: 2 days weekly    Problems taking medications regularly No    Medication side effects: No    Have you had an eye exam in the past two years? yes    Do you see a dentist twice per year? yes    Do you have sleep apnea, excessive snoring or daytime drowsiness?yes - CPAP         NEW CONCERNS    Headache  Duration of complaint: acute (< 1 week)  Description:   Location: unilateral in the left frontal area, unilateral in the left temporal area   Character: throbbing pain  Frequency:  daily  Duration:  Few hours.  Intensity: moderate, severe  Progression of Symptoms:  same  Accompanying Signs & Symptoms:  Stiff neck: no   Neck or upper back pain: no  Fever: no  Sinus pressure: no   Nausea or vomiting: YES  Dizziness: no   Numbness: no   Weakness: no   Visual changes: no   History:   Head trauma: no   Family history of migraines: no   Previous tests for headaches: no   Neurologist evaluations: no   Able to do daily activities: no   Wake with a headaches: YES  Do headaches wake you up: no   Daily pain medication use: no   Work/school stressors/changes: no   Precipitating factors:   Does light make it worse: YES  Does sound make it worse: YES  Alleviating factors:  Does sleep help: YES  Therapies Tried and outcome: Ibuprofen (Advil, Motrin) - modestly effective, but causes epigastric pain.    Today's PHQ-2 Score:   PHQ-2 ( 1999 Pfizer) 4/27/2018 4/5/2016   Q1: Little interest or pleasure in doing things 0 0   Q2: Feeling down, depressed or hopeless 0 0   PHQ-2 Score 0 0       Abuse: Current or Past(Physical, Sexual or Emotional)- No  Do you feel safe in your environment? Yes    Social History     Tobacco Use     Smoking status: Former  Smoker     Packs/day: 0.50     Years: 10.00     Pack years: 5.00     Types: Cigarettes     Last attempt to quit: 3/18/2001     Years since quittin.5     Smokeless tobacco: Never Used     Tobacco comment: QUIT 9 YEARS AGO   Substance Use Topics     Alcohol use: Yes     Alcohol/week: 0.0 standard drinks     Comment: very rarely     If you drink alcohol do you typically have >3 drinks per day or >7 drinks per week? No                      Last PSA: No results found for: PSA    Reviewed orders with patient. Reviewed health maintenance and updated orders accordingly - Yes  Lab work is in process  Labs reviewed in EPIC  BP Readings from Last 3 Encounters:   19 115/79   19 107/73   10/29/18 130/84    Wt Readings from Last 3 Encounters:   19 93 kg (205 lb)   19 91 kg (200 lb 9.6 oz)   10/29/18 89.2 kg (196 lb 9.6 oz)                  Patient Active Problem List   Diagnosis     ADD (attention deficit disorder)     HARLAN (obstructive sleep apnea)     Hyperlipidemia LDL goal <160     Generalized anxiety disorder     Benign skin lesion of face     Dysthymia     Allergic rhinitis     Abnormal CXR perihilar nodes on CT chest     History of drug abuse     Palpitations     Mediastinal lymphadenopathy     Spondylosis of cervical region, at C6-C7, without myelopathy or radiculopathy     Cervical myofascial strain, sequela     Past Surgical History:   Procedure Laterality Date     ENDOBRONCHIAL ULTRASOUND FLEXIBLE N/A 2015    Procedure: ENDOBRONCHIAL ULTRASOUND FLEXIBLE;  Surgeon: Ramakrishna Williamson MD;  Location:  GI     VASECTOMY         Social History     Tobacco Use     Smoking status: Former Smoker     Packs/day: 0.50     Years: 10.00     Pack years: 5.00     Types: Cigarettes     Last attempt to quit: 3/18/2001     Years since quittin.5     Smokeless tobacco: Never Used     Tobacco comment: QUIT 9 YEARS AGO   Substance Use Topics     Alcohol use: Yes     Alcohol/week: 0.0 standard  drinks     Comment: very rarely     Family History   Problem Relation Age of Onset     C.A.D. Maternal Grandfather      Alcohol/Drug Maternal Grandfather          from alcoholism     Cerebrovascular Disease Maternal Grandfather      Lipids Father      Cancer Maternal Grandmother         Pancreatic     Cancer Paternal Grandmother         Lung, may not have been primary     Heart Disease Paternal Grandfather      Anxiety Disorder Sister      Anxiety Disorder Sister      LUNG DISEASE Maternal Uncle         Asthma versus sarcoid     Asthma No family hx of      Diabetes No family hx of      Hypertension No family hx of      Breast Cancer No family hx of      Cancer - colorectal No family hx of      Prostate Cancer No family hx of      Connective Tissue Disorder No family hx of      Thyroid Disease No family hx of          No Known Allergies  Recent Labs   Lab Test 19  0838 16  1147 01/07/15  0829 12  0858 10/27/11  1558   *  --  122 132*  --    HDL 39*  --  50 39*  --    TRIG 79  --  114 164*  --    ALT 50 67 56  --   --    CR 0.88  --  0.97  --   --    GFRESTIMATED >90  --  83  --   --    GFRESTBLACK >90  --  >90  African American GFR Calc    --   --    POTASSIUM 4.0  --  3.7  --   --    TSH  --   --  1.46  --  1.38        Reviewed and updated as needed this visit by clinical staff  Tobacco  Allergies  Meds         Reviewed and updated as needed this visit by Provider            ROS:  CONSTITUTIONAL: NEGATIVE for fever, chills, change in weight  INTEGUMENTARY/SKIN: NEGATIVE for worrisome rashes, moles or lesions  EYES: NEGATIVE for vision changes or irritation  ENT: NEGATIVE for ear, mouth and throat problems  RESP: NEGATIVE for significant cough or SOB  CV: NEGATIVE for chest pain, palpitations or peripheral edema  GI: NEGATIVE for hematemesis, hematochezia, jaundice and melena   male: negative for dysuria, hematuria, decreased urinary stream, erectile dysfunction, urethral  "discharge  MUSCULOSKELETAL: NEGATIVE for significant arthralgias or myalgia  NEURO: NEGATIVE for weakness, dizziness or paresthesias  ENDOCRINE: NEGATIVE for temperature intolerance, skin/hair changes  HEME/ALLERGY/IMMUNE: NEGATIVE for bleeding problems  PSYCHIATRIC: NEGATIVE for changes in mood or affect    OBJECTIVE:   /79 (BP Location: Left arm, Patient Position: Sitting, Cuff Size: Adult Regular)   Pulse 68   Temp 97.7  F (36.5  C) (Oral)   Resp 16   Ht 1.816 m (5' 11.5\")   Wt 93 kg (205 lb)   SpO2 96%   BMI 28.19 kg/m    EXAM:  GENERAL: healthy, alert and no distress  EYES: Eyes grossly normal to inspection, PERRL and conjunctivae and sclerae normal  HENT: ear canals and TM's normal, nose and mouth without ulcers or lesions  NECK: no adenopathy, no asymmetry, masses, or scars and thyroid normal to palpation  RESP: lungs clear to auscultation - no rales, rhonchi or wheezes  CV: regular rate and rhythm, normal S1 S2, no S3 or S4, no murmur, click or rub, no peripheral edema and peripheral pulses strong  ABDOMEN: soft, nontender, no hepatosplenomegaly, no masses and bowel sounds normal  MS: no gross musculoskeletal defects noted, no edema  SKIN: no suspicious lesions or rashes  NEURO: Normal strength and tone, mentation intact and speech normal  PSYCH: mentation appears normal, affect normal/bright    Diagnostic Test Results:  Results for orders placed or performed in visit on 09/23/19   Lipid panel reflex to direct LDL Non-fasting   Result Value Ref Range    Cholesterol 179 <200 mg/dL    Triglycerides 79 <150 mg/dL    HDL Cholesterol 39 (L) >39 mg/dL    LDL Cholesterol Calculated 124 (H) <100 mg/dL    Non HDL Cholesterol 140 (H) <130 mg/dL   CBC with platelets and differential   Result Value Ref Range    WBC 5.1 4.0 - 11.0 10e9/L    RBC Count 4.72 4.4 - 5.9 10e12/L    Hemoglobin 14.2 13.3 - 17.7 g/dL    Hematocrit 41.5 40.0 - 53.0 %    MCV 88 78 - 100 fl    MCH 30.1 26.5 - 33.0 pg    MCHC 34.2 31.5 - " 36.5 g/dL    RDW 12.7 10.0 - 15.0 %    Platelet Count 113 (L) 150 - 450 10e9/L    % Neutrophils 55.1 %    % Lymphocytes 26.8 %    % Monocytes 12.0 %    % Eosinophils 5.9 %    % Basophils 0.2 %    Absolute Neutrophil 2.8 1.6 - 8.3 10e9/L    Absolute Lymphocytes 1.4 0.8 - 5.3 10e9/L    Absolute Monocytes 0.6 0.0 - 1.3 10e9/L    Absolute Eosinophils 0.3 0.0 - 0.7 10e9/L    Absolute Basophils 0.0 0.0 - 0.2 10e9/L    Diff Method Automated Method    Comprehensive metabolic panel (BMP + Alb, Alk Phos, ALT, AST, Total. Bili, TP)   Result Value Ref Range    Sodium 137 133 - 144 mmol/L    Potassium 4.0 3.4 - 5.3 mmol/L    Chloride 103 94 - 109 mmol/L    Carbon Dioxide 28 20 - 32 mmol/L    Anion Gap 6 3 - 14 mmol/L    Glucose 93 70 - 99 mg/dL    Urea Nitrogen 16 7 - 30 mg/dL    Creatinine 0.88 0.66 - 1.25 mg/dL    GFR Estimate >90 >60 mL/min/[1.73_m2]    GFR Estimate If Black >90 >60 mL/min/[1.73_m2]    Calcium 9.1 8.5 - 10.1 mg/dL    Bilirubin Total 0.3 0.2 - 1.3 mg/dL    Albumin 3.8 3.4 - 5.0 g/dL    Protein Total 8.1 6.8 - 8.8 g/dL    Alkaline Phosphatase 121 40 - 150 U/L    ALT 50 0 - 70 U/L    AST 31 0 - 45 U/L       ASSESSMENT/PLAN:   1. Routine general medical examination at a health care facility  No family history of premature atherosclerotic heart disease or early-onset colon cancer.  - Lipid panel reflex to direct LDL Non-fasting  - CBC with platelets and differential  - Comprehensive metabolic panel (BMP + Alb, Alk Phos, ALT, AST, Total. Bili, TP)    2. Screen for colon cancer  Due for test.  - GASTROENTEROLOGY ADULT REF PROCEDURE ONLY [facility] [phone]; No Provider Preference    3. CARDIOVASCULAR SCREENING; LDL GOAL LESS THAN 130  Low risk of heart disease/stroke.  - Lipid panel reflex to direct LDL Non-fasting    4. Need for prophylactic vaccination and inoculation against influenza  No absolute contraindications.  - FLU VAC PRESRV FREE QUAD SPLIT VIR 3+YRS IM    5. Intractable migraine without aura  "and without status migrainosus  Cause of acute moderate-severe headaches, associated with nausea.  - eletriptan (RELPAX) 20 MG tablet; Take 1 tablet (20 mg) by mouth at onset of headache for migraine May repeat in 2 hours. Max 2  tablets/24 hours.  Dispense: 30 tablet; Refill: 5  - CT Head w/o Contrast; Future  - NEUROLOGY ADULT REFERRAL; Future  - amitriptyline (ELAVIL) 10 MG tablet; Take 1 tablet one hour before bedtime. May increase to 2 tablets per dose.  Dispense: 60 tablet; Refill: 5    6. Nausea  Secondary to migraines.  - Metoclopramide HCl 5 MG TBDP; Take 1 tablet by mouth 4 times daily as needed (nausea)  Dispense: 30 tablet; Refill: 11    COUNSELING:  Special attention given to:        Regular exercise       Healthy diet/nutrition       Immunizations    Vaccinated for: Influenza             Colon cancer screening       Prostate cancer screening       The 10-year ASCVD risk score (Mary Kate LAZAR Jr., et al., 2013) is: 3.2%    Values used to calculate the score:      Age: 49 years      Sex: Male      Is Non- : No      Diabetic: No      Tobacco smoker: No      Systolic Blood Pressure: 115 mmHg      Is BP treated: No      HDL Cholesterol: 39 mg/dL      Total Cholesterol: 179 mg/dL    Estimated body mass index is 28.19 kg/m  as calculated from the following:    Height as of this encounter: 1.816 m (5' 11.5\").    Weight as of this encounter: 93 kg (205 lb).    Weight management plan: diet and exercise.     reports that he quit smoking about 18 years ago. His smoking use included cigarettes. He has a 5.00 pack-year smoking history. He has never used smokeless tobacco.      Counseling Resources:  ATP IV Guidelines  Pooled Cohorts Equation Calculator  FRAX Risk Assessment  ICSI Preventive Guidelines  Dietary Guidelines for Americans, 2010  USDA's MyPlate  ASA Prophylaxis  Lung CA Screening    Rocky Yi MD  [facility]  "

## 2019-09-25 ENCOUNTER — MYC MEDICAL ADVICE (OUTPATIENT)
Dept: FAMILY MEDICINE | Facility: CLINIC | Age: 49
End: 2019-09-25

## 2019-09-25 ENCOUNTER — ANCILLARY PROCEDURE (OUTPATIENT)
Dept: CT IMAGING | Facility: CLINIC | Age: 49
End: 2019-09-25
Attending: INTERNAL MEDICINE
Payer: COMMERCIAL

## 2019-09-25 DIAGNOSIS — R51.9 ACUTE INTRACTABLE HEADACHE, UNSPECIFIED HEADACHE TYPE: ICD-10-CM

## 2019-09-25 PROCEDURE — 70450 CT HEAD/BRAIN W/O DYE: CPT | Performed by: RADIOLOGY

## 2019-09-25 RX ORDER — AMITRIPTYLINE HYDROCHLORIDE 10 MG/1
TABLET ORAL
Qty: 60 TABLET | Refills: 5 | Status: SHIPPED | OUTPATIENT
Start: 2019-09-25 | End: 2019-10-15 | Stop reason: DRUGHIGH

## 2019-09-27 LAB — PSA SERPL-ACNC: 0.32 UG/L (ref 0–4)

## 2019-10-01 ENCOUNTER — MYC MEDICAL ADVICE (OUTPATIENT)
Dept: FAMILY MEDICINE | Facility: CLINIC | Age: 49
End: 2019-10-01

## 2019-10-02 NOTE — TELEPHONE ENCOUNTER
Patient was sent MyC message today with recommendation to schedule OV with PCP to further discuss headaches and medications.   See 10/1/19 MyC message in chart.    Shanda Hardwick RN

## 2019-10-02 NOTE — TELEPHONE ENCOUNTER
See 9/25/19 MyC message in chart. Provider wants patient to be seen in office to further evaluate and discuss headaches.  MyC message sent advising to schedule OV.    Shanda Hardwick RN

## 2019-10-03 ENCOUNTER — TELEPHONE (OUTPATIENT)
Dept: FAMILY MEDICINE | Facility: CLINIC | Age: 49
End: 2019-10-03

## 2019-10-03 ENCOUNTER — PRE VISIT (OUTPATIENT)
Dept: NEUROLOGY | Facility: CLINIC | Age: 49
End: 2019-10-03

## 2019-10-03 PROBLEM — M47.812 SPONDYLOSIS OF CERVICAL REGION WITHOUT MYELOPATHY OR RADICULOPATHY: Status: RESOLVED | Noted: 2018-07-25 | Resolved: 2019-10-03

## 2019-10-03 NOTE — TELEPHONE ENCOUNTER
FUTURE VISIT INFORMATION      FUTURE VISIT INFORMATION:    Date: 1/6/2020    Time: 8AM    Location: [facility]  REFERRAL INFORMATION:    Referring provider:  Dr. Yi     Referring providers clinic:  [facility]     Reason for visit/diagnosis  Migraines     RECORDS REQUESTED FROM:       Clinic name Comments Records Status Imaging Status

## 2019-10-03 NOTE — TELEPHONE ENCOUNTER
This writer attempted to contact Patient on 10/03/19      Reason for call appointment today and left detailed message.      If patient calls back:   PLEASE I M FIRST FLOOR MAs. Provider wants to speak to the patient ASAP over the phone.         Marilyn Jordan

## 2019-10-03 NOTE — PROGRESS NOTES
Patient did not return for further treatment and no additional progress was noted.  Please refer to the progress note and goal flowsheet completed on 11/05/18 for discharge information.

## 2019-10-03 NOTE — TELEPHONE ENCOUNTER
I spoke with Mr. Contreras today.    His headaches are occurring daily.    I instructed Mr. Contreras to take Ibuprofen 400 mg at the first sign of migraine headaches. Then take Relpax 20 mg if headaches persists within 2 - 4 hours.    I also instructed Mr. Contreras to titrate dose of Amitriptyline to as much 30-50 mg per dose.   I emphasized the importance of adjusting dose of maintenance meds for migraines.    I advised him to call our clinic if his headaches persist.

## 2019-10-03 NOTE — TELEPHONE ENCOUNTER
Reason for Call:  Other call back    Detailed comments: Pt returning phone call and would like a phone call back as soon as possible to speak with Dr. Yi.      Phone Number Patient can be reached at: Home number on file [phone] (home)    Best Time: anytime    Can we leave a detailed message on this number? YES    Call taken on 10/3/2019 at 11:06 AM by Kenrick Sotomayor

## 2019-10-08 ENCOUNTER — MYC MEDICAL ADVICE (OUTPATIENT)
Dept: FAMILY MEDICINE | Facility: CLINIC | Age: 49
End: 2019-10-08

## 2019-10-08 DIAGNOSIS — G43.019 INTRACTABLE MIGRAINE WITHOUT AURA AND WITHOUT STATUS MIGRAINOSUS: Primary | ICD-10-CM

## 2019-10-09 RX ORDER — RIZATRIPTAN BENZOATE 10 MG/1
10 TABLET ORAL
Qty: 18 TABLET | Refills: 11 | Status: SHIPPED | OUTPATIENT
Start: 2019-10-09 | End: 2020-10-28

## 2019-10-15 ENCOUNTER — MYC MEDICAL ADVICE (OUTPATIENT)
Dept: FAMILY MEDICINE | Facility: CLINIC | Age: 49
End: 2019-10-15

## 2019-10-15 DIAGNOSIS — G43.019 INTRACTABLE MIGRAINE WITHOUT AURA AND WITHOUT STATUS MIGRAINOSUS: Primary | ICD-10-CM

## 2019-10-15 NOTE — TELEPHONE ENCOUNTER
E-visit instructions given to Conor to further discuss medication changes.     Jaden Fajardo RN, BSN, PHN

## 2019-11-07 ENCOUNTER — DOCUMENTATION ONLY (OUTPATIENT)
Dept: CARE COORDINATION | Facility: CLINIC | Age: 49
End: 2019-11-07

## 2019-11-19 ENCOUNTER — HOSPITAL ENCOUNTER (OUTPATIENT)
Facility: AMBULATORY SURGERY CENTER | Age: 49
End: 2019-11-19
Attending: INTERNAL MEDICINE
Payer: COMMERCIAL

## 2020-06-15 ENCOUNTER — TELEPHONE (OUTPATIENT)
Dept: FAMILY MEDICINE | Facility: CLINIC | Age: 50
End: 2020-06-15

## 2020-06-16 ENCOUNTER — VIRTUAL VISIT (OUTPATIENT)
Dept: PHYSICAL THERAPY | Facility: CLINIC | Age: 50
End: 2020-06-16
Payer: COMMERCIAL

## 2020-06-16 DIAGNOSIS — G89.29 CHRONIC LEFT SHOULDER PAIN: ICD-10-CM

## 2020-06-16 DIAGNOSIS — M75.42 IMPINGEMENT SYNDROME, SHOULDER, LEFT: ICD-10-CM

## 2020-06-16 DIAGNOSIS — M25.512 CHRONIC LEFT SHOULDER PAIN: ICD-10-CM

## 2020-06-16 PROCEDURE — 97161 PT EVAL LOW COMPLEX 20 MIN: CPT | Mod: GT | Performed by: PHYSICAL THERAPIST

## 2020-06-16 PROCEDURE — 97112 NEUROMUSCULAR REEDUCATION: CPT | Mod: GT | Performed by: PHYSICAL THERAPIST

## 2020-06-16 PROCEDURE — 97110 THERAPEUTIC EXERCISES: CPT | Mod: GT | Performed by: PHYSICAL THERAPIST

## 2020-06-16 NOTE — PROGRESS NOTES
"Physical Therapy Virtual Initial Visit      The patient has been notified of following:     \"This virtual visit will be conducted between you and your provider. We have found that certain health care needs can be provided without the need for physical presence.  This service lets us provide the care you need with a virtual visit.\"    Due to external, as well as internal [facility] management of the COVID-19 Virus, Conor Contreras was not seen in our clinic.  As a substitution, we implemented a virtual visit to manage this patient's condition utilizing the [employer] virtual visit platform via the patient s existing code.  The provider, Akua Matthews, reviewed the patient's chart, PTRx prescription, and spoke with the patient to determine the following telemedicine visit is appropriate and effective for the patient's care.    The following type of visit was completed:   Video Visit:  The [employer] platform uses a synchronous HIPAA compliant video stream for this patient encounter.         Subjective:    Therapist Generated HPI Evaluation  Problem details: Self referred. Khoa thinks the exercises he was doing 3 months ago with 15 weights over shoulders must have caused the shoulder pain and also using the left arm behind the couch while watching TV aggravated the pain in his left shoulder. The pain was more internal to begin with and gradually the intensity increased. He quit going to gym and started working out at home. He was unable to reach overhead completely. The pain is mostly in the upper part of the shoulder. He sleeps on his back but has some discomfort. Reaching behind back, front, side does increase pain. He has not iced it or taken any medication. Circular motion of arm bothers him. .         Type of problem:  Left shoulder.    This is a new condition.  Condition occurred with:  Lifting.    Patient reports pain:  Lateral.    Pain radiates to:  Shoulder.     Associated symptoms:  Painful arc. Symptoms " are exacerbated by carrying, lifting, using arm behind back, using arm overhead and using arm at shoulder level  Relieved by: rest.          Patient Health History  Conor Contreras being seen for left shoulder pain.     Date of Onset: 3 months back.   Problem occurred: lifting heavy weight over shoulder for exercises   Pain is reported as 4/10 on pain scale.  General health as reported by patient is good.  Pertinent medical history includes: none.   Red flags:  None as reported by patient.         Current medications:  None.    Current occupation is computer.   Primary job tasks include:  Computer work and prolonged sitting.                              Objective:    Standing Alignment:      Shoulder/UE:  Rounded shoulders                  Flexibility/Screens:     Upper Extremity:    Decreased left upper extremity flexibility at:  Pectoralis Major and Pectoralis Minor    Decreased right upper extremity flexibility present at:  Pectoralis Major and Pectoralis Minor                         Shoulder Evaluation:  ROM:  AROM:    Flexion:  Left:  Til shoulder level pain level 4/10      Extension: Left: WFL no pain  Abduction:  Left: 100 degrees with no pain- 2/10 to 125 degrees     Adduction:   Left:  No pain    Internal Rotation:  Left:  T7 level 2/10 pain level      External Rotation:  Left:  4/10 pain level                            Stability Testing:  normal      Special Tests:    Left shoulder positive for the following special tests:  Impingement    Palpation:  Palpation assessed shoulder: self palpation   Left shoulder tenderness present at:  Deltoid                                     General   ROS    PTRx Content from today's visit:  Exercise Name: Icing, Sets: 1 set - Reps: 10-15 min  - Sessions: 2-3 x day, Notes: elastogel- [employer]- 9 x 24 size  Exercise Name: Scapular Retraction/Depression, Sets: 1 set - Reps: 10 reps with 10 sec hold each in sitting while working - Sessions: every 2 hours, Notes: Focus on  deep breathing exercise while holding this scapular position.  Exercise Name: Prone Arm Raise #1, Sets: 1 set - Reps: 10 reps each with 5-10 sec hold  - Sessions: 1 x day, Notes: PALM FACING DOWN  Exercise Name: Reverse Pendulum Supine or Sidelying, Sets: 3 set- perform while lying on your back only - Reps:  10 reps within pain limits - Sessions: 2 x day, Notes: front/back, side-side, clockwise and counterclockwise- activate shoulder blade muscles  Exercise Name: Supine Ceiling Punch, Sets: 1 set - Reps: 20 reps  - Sessions:  2 x day  Exercise Name: Shoulder External Rotation Sidelying, Sets: 1 set - Reps: 15 reps  - Sessions: 2 x day, Notes: painfree range , activate shoulder blade muscles    Assessment/Plan:     Patient is a 50 year old male with left side shoulder complaints.    Patient has the following significant findings with corresponding treatment plan.                Diagnosis 1:  Left shoulder pain- Impingement syndrome- Painful arc  Pain -  hot/cold therapy, US, electric stimulation, manual therapy, STS, self management, education and home program  Decreased ROM/flexibility - manual therapy, therapeutic exercise, therapeutic activity and home program  Inflammation - cold therapy, US and self management/home program  Decreased function - therapeutic activities and home program    Therapy Evaluation Codes:   1) History comprised of:   Personal factors that impact the plan of care:      Time since onset of symptoms.    Comorbidity factors that impact the plan of care are:      check chart.     Medications impacting care: check cahrt.  2) Examination of Body Systems comprised of:   Body structures and functions that impact the plan of care:      Shoulder.   Activity limitations that impact the plan of care are:      Dressing, Lifting, Reading/Computer work and ADL.  3) Clinical presentation characteristics are:   Stable/Uncomplicated.  4) Decision-Making    Low complexity using standardized patient  assessment instrument and/or measureable assessment of functional outcome.  Cumulative Therapy Evaluation is: Low complexity.    Previous and current functional limitations:  (See Goal Flow Sheet for this information)    Short term and Long term goals: (See Goal Flow Sheet for this information)     Communication ability:  Patient appears to be able to clearly communicate and understand verbal and written communication and follow directions correctly.  Treatment Explanation - The following has been discussed with the patient:   RX ordered/plan of care  Anticipated outcomes  Possible risks and side effects  This patient would benefit from PT intervention to resume normal activities.   Rehab potential is good.    Frequency:  1 X every other week, once daily  Duration:  for 8 weeks  Discharge Plan:  Achieve all LTG.  Independent in home treatment program.  Reach maximal therapeutic benefit.    Please refer to the daily flowsheet for treatment today, total treatment time and time spent performing 1:1 timed codes.       Virtual visit contact time    Time of service began: 7.55 AM  Time of service ended: 8.45 AM  Total Time for set up, visit, and documentation: 50 minutes    Payor: PREFERREDONE / Plan: PREFERREDONE HMO / Product Type: PPO /     Procedure Code/s   Therapeutic Exercise (62518): 25 minutes  Neuromuscular Re-education (89183): 10 minutes  Evaluation: 15 minutes    I have reviewed the note as documented above.  This accurately captures the substance of my conversation with the patient.  Provider location: [place] ([facility]/State)  Patient location: Home    ___________________________________________________

## 2020-06-16 NOTE — TELEPHONE ENCOUNTER
Called and spoke with patient. He states the neck spasm has been an ongoing issue for a while, and got better, but working from home with multiple monitors must have triggered the spasm again.  Also, left shoulder pain, states this is new condition.    Mechelle Plasencia MA         
Patient informed by phone of provider's message. Virtual visit scheduled for tomorrow with provider.  Mechelle Plasencia MA    
Patient requires a clinic encounter (telephone visit) before I send orders for physical therapy.  
Please call patient and inquire the indication for physical therapy.  
Reason for Call: Request for an order or referral:    Order or referral being requested: Referral    Date needed: as soon as possible    Has the patient been seen by the PCP for this problem? YES    Additional comments: Pt calling for he is requesting Physical Therapy orders to be sent to the [facility] .  He would like a call back to confirm referral has been sent so that he can schedule an appointment.    Phone number Patient can be reached at:  Home number on file [phone] (home)    Best Time:  anytime    Can we leave a detailed message on this number?  YES    Call taken on 6/15/2020 at 11:21 AM by Kenrick Sotomayor            
No

## 2020-06-17 ENCOUNTER — VIRTUAL VISIT (OUTPATIENT)
Dept: FAMILY MEDICINE | Facility: CLINIC | Age: 50
End: 2020-06-17
Payer: COMMERCIAL

## 2020-06-17 DIAGNOSIS — M62.838 NECK MUSCLE SPASM: ICD-10-CM

## 2020-06-17 DIAGNOSIS — M40.50 LORDOSIS OF CERVICOTHORACIC REGION: ICD-10-CM

## 2020-06-17 DIAGNOSIS — M25.512 ACUTE PAIN OF LEFT SHOULDER: Primary | ICD-10-CM

## 2020-06-17 DIAGNOSIS — M79.18 PAIN OF LEFT DELTOID: ICD-10-CM

## 2020-06-17 DIAGNOSIS — M47.812 ARTHRITIS OF FACET JOINT OF CERVICAL SPINE: ICD-10-CM

## 2020-06-17 PROCEDURE — 99213 OFFICE O/P EST LOW 20 MIN: CPT | Mod: TEL | Performed by: INTERNAL MEDICINE

## 2020-06-17 ASSESSMENT — PATIENT HEALTH QUESTIONNAIRE - PHQ9: SUM OF ALL RESPONSES TO PHQ QUESTIONS 1-9: 1

## 2020-06-17 ASSESSMENT — PAIN SCALES - GENERAL: PAINLEVEL: MILD PAIN (2)

## 2020-06-17 NOTE — PROGRESS NOTES
"Conor Contreras is a 50 year old male who is being evaluated via a billable telephone visit.      The patient has been notified of following:     \"This telephone visit will be conducted via a call between you and your physician/provider. We have found that certain health care needs can be provided without the need for a physical exam.  This service lets us provide the care you need with a short phone conversation.  If a prescription is necessary we can send it directly to your pharmacy.  If lab work is needed we can place an order for that and you can then stop by our lab to have the test done at a later time.    Telephone visits are billed at different rates depending on your insurance coverage. During this emergency period, for some insurers they may be billed the same as an in-person visit.  Please reach out to your insurance provider with any questions.    If during the course of the call the physician/provider feels a telephone visit is not appropriate, you will not be charged for this service.\"    Patient has given verbal consent for Telephone visit?  Yes    What phone number would you like to be contacted at? [phone]    How would you like to obtain your AVS? MyChart    Subjective     Conor Contreras is a 50 year old male who presents via phone visit today for the following health issues:    HPI  Joint Pain    Onset: 3 months     Description:   Location: left shoulder, above biceps (deltoid)  Character: Stabbing and Burning    Intensity: mild, moderate    Progression of Symptoms: constant     Accompanying Signs & Symptoms:  Other symptoms: weakness of shoulder     History:   Previous similar pain: no       Precipitating factors:   Trauma: No  Overuse: YES, while lifting left shoulder in a gym. Resting outstretched left shoulder while watching T.V.    Alleviating factors:  Improved by: ice  Therapies Tried and outcome: ice. Avoidance of weightlifting. CBD oil. Topical NSAIDs.           Patient Active " Problem List   Diagnosis     ADD (attention deficit disorder)     HARLAN (obstructive sleep apnea)     Hyperlipidemia LDL goal <160     Generalized anxiety disorder     Benign skin lesion of face     Dysthymia     Allergic rhinitis     Abnormal CXR perihilar nodes on CT chest     History of drug abuse (H)     Palpitations     Mediastinal lymphadenopathy     Cervical myofascial strain, sequela     Left shoulder pain     Impingement syndrome, shoulder, left     Past Surgical History:   Procedure Laterality Date     ENDOBRONCHIAL ULTRASOUND FLEXIBLE N/A 2015    Procedure: ENDOBRONCHIAL ULTRASOUND FLEXIBLE;  Surgeon: Ramakrishna Williamson MD;  Location: [facility]     VASECTOMY         Social History     Tobacco Use     Smoking status: Former Smoker     Packs/day: 0.50     Years: 10.00     Pack years: 5.00     Types: Cigarettes     Last attempt to quit: 3/18/2001     Years since quittin.2     Smokeless tobacco: Never Used     Tobacco comment: QUIT 9 YEARS AGO   Substance Use Topics     Alcohol use: Yes     Alcohol/week: 0.0 standard drinks     Comment: very rarely     Family History   Problem Relation Age of Onset     C.A.D. Maternal Grandfather      Alcohol/Drug Maternal Grandfather          from alcoholism     Cerebrovascular Disease Maternal Grandfather      Lipids Father      Cancer Maternal Grandmother         Pancreatic     Cancer Paternal Grandmother         Lung, may not have been primary     Heart Disease Paternal Grandfather      Anxiety Disorder Sister      Anxiety Disorder Sister      LUNG DISEASE Maternal Uncle         Asthma versus sarcoid     Asthma No family hx of      Diabetes No family hx of      Hypertension No family hx of      Breast Cancer No family hx of      Cancer - colorectal No family hx of      Prostate Cancer No family hx of      Connective Tissue Disorder No family hx of      Thyroid Disease No family hx of          Current Outpatient Medications   Medication Sig Dispense Refill      amitriptyline (ELAVIL) 25 MG tablet Take 1-2 tablets (25-50 mg) by mouth At Bedtime 180 tablet 3     amphetamine-dextroamphetamine (ADDERALL XR) 15 MG per 24 hr capsule TAKE ONE CAPSULE BY MOUTH EVERY MORNING PLEASE MAKE AN APPOINTMENT FOR FUTURE REFILLS  0     cetirizine (ZYRTEC) 10 MG tablet Take 1 tablet (10 mg) by mouth At Bedtime 30 tablet 11     eletriptan (RELPAX) 20 MG tablet Take 1 tablet (20 mg) by mouth at onset of headache for migraine May repeat in 2 hours. Max 2  tablets/24 hours. 30 tablet 5     Metoclopramide HCl 5 MG TBDP Take 1 tablet by mouth 4 times daily as needed (nausea) 30 tablet 11     rizatriptan (MAXALT) 10 MG tablet Take 1 tablet (10 mg) by mouth at onset of headache for migraine May repeat in 2 hours. Max 2 tablets/24 hours. 18 tablet 11     venlafaxine (EFFEXOR XR) 150 MG 24 hr capsule Take 150 mg by mouth daily       No Known Allergies  Recent Labs   Lab Test 09/23/19  0838 04/05/16  1147 01/07/15  0829 04/25/12  0858   *  --  122 132*   HDL 39*  --  50 39*   TRIG 79  --  114 164*   ALT 50 67 56  --    CR 0.88  --  0.97  --    GFRESTIMATED >90  --  83  --    GFRESTBLACK >90  --  >90  African American GFR Calc    --    POTASSIUM 4.0  --  3.7  --    TSH  --   --  1.46  --       BP Readings from Last 3 Encounters:   09/23/19 115/79   03/18/19 107/73   10/29/18 130/84    Wt Readings from Last 3 Encounters:   09/23/19 93 kg (205 lb)   03/18/19 91 kg (200 lb 9.6 oz)   10/29/18 89.2 kg (196 lb 9.6 oz)                    Reviewed and updated as needed this visit by Provider         Review of Systems   CONSTITUTIONAL: NEGATIVE for fever, chills, change in weight  INTEGUMENTARY/SKIN: NEGATIVE for worrisome rashes, moles or lesions  EYES: NEGATIVE for vision changes or irritation  ENT/MOUTH: NEGATIVE for ear, mouth and throat problems  RESP: NEGATIVE for significant cough or SOB  CV: NEGATIVE for chest pain, palpitations or peripheral edema  GI: NEGATIVE for nausea, abdominal pain,  heartburn, or change in bowel habits  : NEGATIVE for frequency, dysuria, or hematuria  MUSCULOSKELETAL:As above.  NEURO: NEGATIVE for weakness, dizziness or paresthesias  ENDOCRINE: NEGATIVE for temperature intolerance, skin/hair changes  HEME: NEGATIVE for bleeding problems  PSYCHIATRIC: NEGATIVE for changes in mood or affect       Objective   Reported vitals:  There were no vitals taken for this visit.     Remainder of exam unable to be completed due to telephone visits    Diagnostic Test Results:  Epic reviewed.        Assessment/Plan:  1. Acute pain of left shoulder    - [facility]    2. Neck muscle spasm    - [facility]    3. Lordosis of cervicothoracic region    - [facility]    4. Arthritis of facet joint of cervical spine    - [facility]    No follow-ups on file.      Phone call duration:  9 minutes  Start: 7:52 AM  End: 8:01 AM    Rocky Yi MD

## 2020-06-17 NOTE — PATIENT INSTRUCTIONS
At [facility], we strive to deliver an exceptional experience to you, every time we see you. If you receive a survey, please complete it as we do value your feedback.  If you have MyChart, you can expect to receive results automatically within 24 hours of their completion.  Your provider will send a note interpreting your results as well.   If you do not have MyChart, you should receive your results in about a week by mail.    Your care team:                            Family Medicine Internal Medicine   MD Rocky Rodriguez MD Shantel Branch-Fleming, MD Katya Georgiev PA-C Megan Hill, APREMMETT [place], MD Pediatrics   Sean Hernandez, PABETHEL Hilliard, MD Nargis Betancourt APRN CNP   MD Rosalinda Frances MD Deborah Mielke, MD Kim Thein, APRN [facility]      Clinic hours: Monday - Thursday 7 am-7 pm; Fridays 7 am-5 pm.   Urgent care: Monday - Friday 11 am-9 pm; Saturday and Sunday 9 am-5 pm.    Clinic: [phone]       [facility]: Monday - Thursday 8 am - 7 pm; Friday 8 am - 6 pm  [facility]: [phone]     Use [email or web address] for 24/7 diagnosis and treatment of dozens of conditions.

## 2020-06-18 ASSESSMENT — ASTHMA QUESTIONNAIRES: ACT_TOTALSCORE: 25

## 2020-07-07 ENCOUNTER — NURSE TRIAGE (OUTPATIENT)
Dept: NURSING | Facility: CLINIC | Age: 50
End: 2020-07-07

## 2020-07-07 NOTE — TELEPHONE ENCOUNTER
Caller's telephone [phone].   is a [employer] employee and has questions re exposure to COVID-19 and if he should be tested.    Currently  is asymptomatic. Within last days has been in contact with his mother who currently has COVID-19 symptoms by report.   Declines triage.    Advised to contact the [facility] COVID-19 line and request to talk with RN for additional guidance.    Protocol-  Information  Care advice reviewed.   Disposition-  COVID 19 Nurse Triage Plan  Advised to contact [facility] at [phone] re testing.    DANTE Mason RN  [facility]     Please be aware that novel coronavirus (COVID-19) may be circulating in the community. If you develop symptoms such as fever, cough, or SOB or if you have concerns about the presence of another infection including coronavirus (COVID-19), please contact your health care provider or visit [email or web address].     Disposition/Instructions    Patient to stay at home and follow home care protocol based instructions.     Thank you for taking steps to prevent the spread of this virus.  o Limit your contact with others.  o Wear a simple mask to cover your cough.  o Wash your hands well and often.    Resources    [facility]: About COVID-19: [email or web address]    CDC: What to Do If You're Sick: www.cdc.gov/coronavirus/2019-ncov/about/steps-when-sick.html    CDC: Ending Home Isolation: www.cdc.gov/coronavirus/2019-ncov/hcp/disposition-in-home-patients.html     CDC: Caring for Someone: www.cdc.gov/coronavirus/2019-ncov/if-you-are-sick/care-for-someone.html     [facility]: Interim Guidance for Hospital Discharge to Home: www.health.[facility].[place]/diseases/coronavirus/hcp/hospdischarge.pdf    [facility] clinical trials (COVID-19 research studies): [email or web address].[email or web address]/umn-clinical-trials     Below are the COVID-19 hotlines at the [facility] of Health ([facility]). Interpreters are available.   o For health questions: Call  [phone] or 1-997.256.6990 (7 a.m. to 7 p.m.)  o For questions about schools and childcare: Call [phone] or 1-318.789.9457 (7 a.m. to 7 p.m.)                     Caller states understanding of the recommended disposition.   Advised to call back if further questions or concerns.       Additional Information    Health Information question, no triage required and triager able to answer question    Protocols used: INFORMATION ONLY CALL-A-AH

## 2020-07-09 ENCOUNTER — RESULTS ONLY (OUTPATIENT)
Dept: LAB | Age: 50
End: 2020-07-09

## 2020-07-09 ENCOUNTER — APPOINTMENT (OUTPATIENT)
Dept: URGENT CARE | Facility: URGENT CARE | Age: 50
End: 2020-07-09
Payer: COMMERCIAL

## 2020-07-10 LAB
SARS-COV-2 RNA SPEC QL NAA+PROBE: NOT DETECTED
SPECIMEN SOURCE: NORMAL

## 2020-07-15 ENCOUNTER — ANCILLARY PROCEDURE (OUTPATIENT)
Dept: MRI IMAGING | Facility: CLINIC | Age: 50
End: 2020-07-15
Attending: INTERNAL MEDICINE
Payer: COMMERCIAL

## 2020-07-15 ENCOUNTER — ANCILLARY PROCEDURE (OUTPATIENT)
Dept: GENERAL RADIOLOGY | Facility: CLINIC | Age: 50
End: 2020-07-15
Attending: INTERNAL MEDICINE
Payer: COMMERCIAL

## 2020-07-15 DIAGNOSIS — M25.512 ACUTE PAIN OF LEFT SHOULDER: ICD-10-CM

## 2020-07-15 DIAGNOSIS — M79.18 PAIN OF LEFT DELTOID: ICD-10-CM

## 2020-07-15 PROCEDURE — 73221 MRI JOINT UPR EXTREM W/O DYE: CPT | Mod: TC

## 2020-07-15 PROCEDURE — 73030 X-RAY EXAM OF SHOULDER: CPT | Mod: LT

## 2020-07-20 ENCOUNTER — OFFICE VISIT (OUTPATIENT)
Dept: ORTHOPEDICS | Facility: CLINIC | Age: 50
End: 2020-07-20
Payer: COMMERCIAL

## 2020-07-20 VITALS
HEIGHT: 72 IN | WEIGHT: 205 LBS | BODY MASS INDEX: 27.77 KG/M2 | SYSTOLIC BLOOD PRESSURE: 116 MMHG | DIASTOLIC BLOOD PRESSURE: 80 MMHG

## 2020-07-20 DIAGNOSIS — M25.512 CHRONIC LEFT SHOULDER PAIN: Primary | ICD-10-CM

## 2020-07-20 DIAGNOSIS — G89.29 CHRONIC LEFT SHOULDER PAIN: Primary | ICD-10-CM

## 2020-07-20 PROCEDURE — 99213 OFFICE O/P EST LOW 20 MIN: CPT | Mod: 25 | Performed by: FAMILY MEDICINE

## 2020-07-20 PROCEDURE — 20606 DRAIN/INJ JOINT/BURSA W/US: CPT | Mod: LT | Performed by: FAMILY MEDICINE

## 2020-07-20 RX ORDER — TRIAMCINOLONE ACETONIDE 40 MG/ML
40 INJECTION, SUSPENSION INTRA-ARTICULAR; INTRAMUSCULAR
Status: DISCONTINUED | OUTPATIENT
Start: 2020-07-20 | End: 2021-01-18

## 2020-07-20 RX ADMIN — TRIAMCINOLONE ACETONIDE 40 MG: 40 INJECTION, SUSPENSION INTRA-ARTICULAR; INTRAMUSCULAR at 08:56

## 2020-07-20 ASSESSMENT — MIFFLIN-ST. JEOR: SCORE: 1814.93

## 2020-07-20 NOTE — LETTER
7/20/2020         RE: Conor Contreras  [address] [place] [address]        Dear Colleague,    Thank you for referring your patient, Conor Contreras, to the [facility]. Please see a copy of my visit note below.    CHIEF COMPLAINT:       HISTORY OF PRESENT ILLNESS  Mr. Contreras is a pleasant 50 year old male who presents to clinic today with left shoulder pain.  He is seen at the request of Dr. Yi.    Khoa has had pain in his left shoulder for the past 5 or 6 months.  No clear inciting event that he can recall.  He points to the lateral and superior aspect of his left shoulder, achy, worse in certain positions, such as overhead lifting and reaching behind his back.  He denies any numbness or tingling, no weakness in the arm.  He thinks this may have started after lifting weights frequently, over his head.  He has tried physical therapy, unfortunately this has not helped him much, he has felt worse after a session or two.  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  He does note that his shoulder will bother him when he is pushing down on his computer desk, which is sometimes what he does when he is standing at his workstation.      Additional history: as documented    MEDICAL HISTORY  Patient Active Problem List   Diagnosis     ADD (attention deficit disorder)     HARLAN (obstructive sleep apnea)     Hyperlipidemia LDL goal <160     Generalized anxiety disorder     Benign skin lesion of face     Dysthymia     Allergic rhinitis     Abnormal CXR perihilar nodes on CT chest     History of drug abuse (H)     Palpitations     Mediastinal lymphadenopathy     Cervical myofascial strain, sequela     Left shoulder pain     Impingement syndrome, shoulder, left       Current Outpatient Medications   Medication Sig Dispense Refill     amitriptyline (ELAVIL) 25 MG tablet Take 1-2 tablets (25-50 mg) by mouth At Bedtime 180 tablet 3     amphetamine-dextroamphetamine (ADDERALL  "XR) 15 MG per 24 hr capsule TAKE ONE CAPSULE BY MOUTH EVERY MORNING PLEASE MAKE AN APPOINTMENT FOR FUTURE REFILLS  0     cetirizine (ZYRTEC) 10 MG tablet Take 1 tablet (10 mg) by mouth At Bedtime 30 tablet 11     eletriptan (RELPAX) 20 MG tablet Take 1 tablet (20 mg) by mouth at onset of headache for migraine May repeat in 2 hours. Max 2  tablets/24 hours. 30 tablet 5     Metoclopramide HCl 5 MG TBDP Take 1 tablet by mouth 4 times daily as needed (nausea) 30 tablet 11     rizatriptan (MAXALT) 10 MG tablet Take 1 tablet (10 mg) by mouth at onset of headache for migraine May repeat in 2 hours. Max 2 tablets/24 hours. 18 tablet 11     venlafaxine (EFFEXOR XR) 150 MG 24 hr capsule Take 150 mg by mouth daily         No Known Allergies    Family History   Problem Relation Age of Onset     C.A.D. Maternal Grandfather      Alcohol/Drug Maternal Grandfather          from alcoholism     Cerebrovascular Disease Maternal Grandfather      Lipids Father      Cancer Maternal Grandmother         Pancreatic     Cancer Paternal Grandmother         Lung, may not have been primary     Heart Disease Paternal Grandfather      Anxiety Disorder Sister      Anxiety Disorder Sister      LUNG DISEASE Maternal Uncle         Asthma versus sarcoid     Asthma No family hx of      Diabetes No family hx of      Hypertension No family hx of      Breast Cancer No family hx of      Cancer - colorectal No family hx of      Prostate Cancer No family hx of      Connective Tissue Disorder No family hx of      Thyroid Disease No family hx of        Additional medical/Social/Surgical histories reviewed in [facility] and updated as appropriate.        PHYSICAL EXAM    Vitals:    20 0818   BP: 116/80   Weight: 93 kg (205 lb)   Height: 1.816 m (5' 11.5\")     General  - normal appearance, in no obvious distress  HEENT  - conjunctivae not injected, moist mucous membranes  CV  - normal radial pulse  Pulm  - normal respiratory pattern, " non-labored  Musculoskeletal - left shoulder  - inspection: normal bone and joint alignment, no obvious deformity, no scapular winging, no AC step-off  - palpation: mildly tender RC insertion, normal clavicle, non-tender AC  - ROM: painful and limited flexion and abduction at 90  - strength: 5/5  strength, 5/5 in all shoulder planes  - special tests:  (-) Speed's  (+) Neer  (+) Hawkin's  (-) Kota's  Neuro  - no sensory or motor deficit, grossly normal coordination, normal muscle tone  Skin  - no ecchymosis, erythema, warmth, or induration, no obvious rash  Psych  - interactive, appropriate, normal mood and affect               ASSESSMENT & PLAN  Mr. Contreras is a 50 year old male who presents to clinic today with left shoulder pain.    I reviewed his MR in the room with him.  He does have extensive bone marrow edema in his distal clavicle.  He also has a low-grade partial-thickness supraspinatus tear as well as tendinosis of the infraspinatus and subscapularis.    I had a long discussion with Khoa centering around his MRI.  His pain may be secondary to his distal clavicle stress reaction and subsequent joint swelling that is caused by this.  I do wonder if this is causing some degree of subacromial impingement.    I did inject his AC joint today (see procedure note) on a diagnostic and therapeutic basis.  Khoa is going to let me know how he is doing at the end of this week, if this is ineffective I would consider injecting his subacromial bursa.    It was a pleasure seeing Khoa today.    Aside to and separate from the procedure approximately 25 minutes were spent in the room discussing the above.    Favian Pozo DO, [facility]  Primary Care Sports Medicine      This note was constructed using Dragon dictation software, please excuse any minor errors in spelling, grammar, or syntax.      Scribed by Aissatou Ni ATC for Dr. Pozo on 7/20/20 at  8:20 AM, based on the providers statements to me.         [place]  "Sports Medicine  7/20/2020    Conor Contreras's chief complaint for this visit includes:  Chief Complaint   Patient presents with     Consult     left shoulder injection, no known injury, symptoms since since Feb. recent MRI      PCP: Rocky Yi    Referring Provider:  No referring provider defined for this encounter.    /80   Ht 1.816 m (5' 11.5\")   Wt 93 kg (205 lb)   BMI 28.19 kg/m    Data Unavailable       Reason for visit:     What part of your body is injured / painful?  left shoulder    What caused the injury /pain? No inciting event     How long ago did your injury occur or pain begin? several months ago    What are your most bothersome symptoms? Pain    How would you characterize your symptom?  aching    What makes your symptoms better? Rest    What makes your symptoms worse? Movement    Have you been previously seen for this problem? No    Medical History:    Any recent changes to your medical history? No    Any new medication prescribed since last visit? No    Have you had surgery on this body part before? No    Social History:    Occupation: Therapist     Handedness: Right    Review of Systems:    Do you have fever, chills, weight loss? No    Do you have any vision problems? No    Do you have any chest pain or edema? No    Do you have any shortness of breath or wheezing?  No    Do you have stomach problems? No    Do you have any urinary track issues? No    Do you have any numbness or focal weakness? No    Do you have diabetes? No    Do you have problems with bleeding or clotting? No    Do you have an rashes or other skin lesions? No       Medium Joint Injection/Arthrocentesis: L acromioclavicular    Date/Time: 7/20/2020 8:56 AM  Performed by: Favian Pozo DO  Authorized by: Favian Pozo DO     Indications:  Pain  Needle Size:  22 G  Guidance: ultrasound    Location:  Shoulder  Site:  L acromioclavicular  Medications:  40 mg triamcinolone 40 MG/ML  Outcome:  Tolerated well, no " "immediate complications  Procedure discussed: discussed risks, benefits, and alternatives    Consent Given by:  Patient  Timeout: timeout called immediately prior to procedure    Prep: patient was prepped and draped in usual sterile fashion       PROCEDURE    Acromioclavicular Injection - Ultrasound Guided    The patient was informed of the risks and the benefits of the procedure and a written consent was signed.    The patient s left acromioclavicular joint was prepped with chlorhexidine in sterile fashion.   40 mg of triamcinolone suspension was drawn up into a 3 mL syringe with 1 mL of 1% lidocaine.  Injection was performed using sterile technique.  Under ultrasound guidance a 1.5\" 22-gauge needle was used to enter the left acromioclavicular joint.  Needle placement was visualized and documented with ultrasound.  Needle placed in short axis to the probe.  Ultrasound visualization was necessary due to decreased joint space in the setting of osteoarthritis.  Images were permanently stored for the patient's record.  There were no complications. The patient tolerated the procedure well. There was negligible bleeding.   The patient was instructed to ice the shoulder upon leaving clinic and refrain from overuse over the next 3 days.   The patient was instructed to call or go to the emergency room with any unusual pain, swelling, redness, or if otherwise concerned.                Again, thank you for allowing me to participate in the care of your patient.        Sincerely,        Favian Pozo DO    "

## 2020-07-20 NOTE — PROGRESS NOTES
CHIEF COMPLAINT:       HISTORY OF PRESENT ILLNESS  Mr. Contreras is a pleasant 50 year old male who presents to clinic today with left shoulder pain.  He is seen at the request of Dr. Yi.    Khoa has had pain in his left shoulder for the past 5 or 6 months.  No clear inciting event that he can recall.  He points to the lateral and superior aspect of his left shoulder, achy, worse in certain positions, such as overhead lifting and reaching behind his back.  He denies any numbness or tingling, no weakness in the arm.  He thinks this may have started after lifting weights frequently, over his head.  He has tried physical therapy, unfortunately this has not helped him much, he has felt worse after a session or two.  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  He does note that his shoulder will bother him when he is pushing down on his computer desk, which is sometimes what he does when he is standing at his workstation.      Additional history: as documented    MEDICAL HISTORY  Patient Active Problem List   Diagnosis     ADD (attention deficit disorder)     HARLAN (obstructive sleep apnea)     Hyperlipidemia LDL goal <160     Generalized anxiety disorder     Benign skin lesion of face     Dysthymia     Allergic rhinitis     Abnormal CXR perihilar nodes on CT chest     History of drug abuse (H)     Palpitations     Mediastinal lymphadenopathy     Cervical myofascial strain, sequela     Left shoulder pain     Impingement syndrome, shoulder, left       Current Outpatient Medications   Medication Sig Dispense Refill     amitriptyline (ELAVIL) 25 MG tablet Take 1-2 tablets (25-50 mg) by mouth At Bedtime 180 tablet 3     amphetamine-dextroamphetamine (ADDERALL XR) 15 MG per 24 hr capsule TAKE ONE CAPSULE BY MOUTH EVERY MORNING PLEASE MAKE AN APPOINTMENT FOR FUTURE REFILLS  0     cetirizine (ZYRTEC) 10 MG tablet Take 1 tablet (10 mg) by mouth At Bedtime 30 tablet 11     eletriptan (RELPAX) 20 MG tablet Take 1  "tablet (20 mg) by mouth at onset of headache for migraine May repeat in 2 hours. Max 2  tablets/24 hours. 30 tablet 5     Metoclopramide HCl 5 MG TBDP Take 1 tablet by mouth 4 times daily as needed (nausea) 30 tablet 11     rizatriptan (MAXALT) 10 MG tablet Take 1 tablet (10 mg) by mouth at onset of headache for migraine May repeat in 2 hours. Max 2 tablets/24 hours. 18 tablet 11     venlafaxine (EFFEXOR XR) 150 MG 24 hr capsule Take 150 mg by mouth daily         No Known Allergies    Family History   Problem Relation Age of Onset     C.A.D. Maternal Grandfather      Alcohol/Drug Maternal Grandfather          from alcoholism     Cerebrovascular Disease Maternal Grandfather      Lipids Father      Cancer Maternal Grandmother         Pancreatic     Cancer Paternal Grandmother         Lung, may not have been primary     Heart Disease Paternal Grandfather      Anxiety Disorder Sister      Anxiety Disorder Sister      LUNG DISEASE Maternal Uncle         Asthma versus sarcoid     Asthma No family hx of      Diabetes No family hx of      Hypertension No family hx of      Breast Cancer No family hx of      Cancer - colorectal No family hx of      Prostate Cancer No family hx of      Connective Tissue Disorder No family hx of      Thyroid Disease No family hx of        Additional medical/Social/Surgical histories reviewed in [facility] and updated as appropriate.        PHYSICAL EXAM    Vitals:    20 0818   BP: 116/80   Weight: 93 kg (205 lb)   Height: 1.816 m (5' 11.5\")     General  - normal appearance, in no obvious distress  HEENT  - conjunctivae not injected, moist mucous membranes  CV  - normal radial pulse  Pulm  - normal respiratory pattern, non-labored  Musculoskeletal - left shoulder  - inspection: normal bone and joint alignment, no obvious deformity, no scapular winging, no AC step-off  - palpation: mildly tender RC insertion, normal clavicle, non-tender AC  - ROM: painful and limited flexion and abduction at " 90  - strength: 5/5  strength, 5/5 in all shoulder planes  - special tests:  (-) Speed's  (+) Neer  (+) Hawkin's  (-) Kota's  Neuro  - no sensory or motor deficit, grossly normal coordination, normal muscle tone  Skin  - no ecchymosis, erythema, warmth, or induration, no obvious rash  Psych  - interactive, appropriate, normal mood and affect               ASSESSMENT & PLAN  Mr. Contreras is a 50 year old male who presents to clinic today with left shoulder pain.    I reviewed his MR in the room with him.  He does have extensive bone marrow edema in his distal clavicle.  He also has a low-grade partial-thickness supraspinatus tear as well as tendinosis of the infraspinatus and subscapularis.    I had a long discussion with Khoa centering around his MRI.  His pain may be secondary to his distal clavicle stress reaction and subsequent joint swelling that is caused by this.  I do wonder if this is causing some degree of subacromial impingement.    I did inject his AC joint today (see procedure note) on a diagnostic and therapeutic basis.  Khoa is going to let me know how he is doing at the end of this week, if this is ineffective I would consider injecting his subacromial bursa.    It was a pleasure seeing Khoa today.    Aside to and separate from the procedure approximately 25 minutes were spent in the room discussing the above.    Favian Pozo DO, [facility]  Primary Care Sports Medicine      This note was constructed using Dragon dictation software, please excuse any minor errors in spelling, grammar, or syntax.     negative...

## 2020-07-20 NOTE — PROGRESS NOTES
"Scribed by Aissatou Ni ATC for Dr. Pozo on 7/20/20 at  8:20 AM, based on the providers statements to me.         [facility]  7/20/2020    Conor CHELSEY Ben's chief complaint for this visit includes:  Chief Complaint   Patient presents with     Consult     left shoulder injection, no known injury, symptoms since since Feb. recent MRI      PCP: Rocky Yi    Referring Provider:  No referring provider defined for this encounter.    /80   Ht 1.816 m (5' 11.5\")   Wt 93 kg (205 lb)   BMI 28.19 kg/m    Data Unavailable       Reason for visit:     What part of your body is injured / painful?  left shoulder    What caused the injury /pain? No inciting event     How long ago did your injury occur or pain begin? several months ago    What are your most bothersome symptoms? Pain    How would you characterize your symptom?  aching    What makes your symptoms better? Rest    What makes your symptoms worse? Movement    Have you been previously seen for this problem? No    Medical History:    Any recent changes to your medical history? No    Any new medication prescribed since last visit? No    Have you had surgery on this body part before? No    Social History:    Occupation: Therapist     Handedness: Right    Review of Systems:    Do you have fever, chills, weight loss? No    Do you have any vision problems? No    Do you have any chest pain or edema? No    Do you have any shortness of breath or wheezing?  No    Do you have stomach problems? No    Do you have any urinary track issues? No    Do you have any numbness or focal weakness? No    Do you have diabetes? No    Do you have problems with bleeding or clotting? No    Do you have an rashes or other skin lesions? No       Medium Joint Injection/Arthrocentesis: L acromioclavicular    Date/Time: 7/20/2020 8:56 AM  Performed by: Favian Pozo DO  Authorized by: Favian Pozo DO     Indications:  Pain  Needle Size:  22 G  Guidance: " "ultrasound    Location:  Shoulder  Site:  L acromioclavicular  Medications:  40 mg triamcinolone 40 MG/ML  Outcome:  Tolerated well, no immediate complications  Procedure discussed: discussed risks, benefits, and alternatives    Consent Given by:  Patient  Timeout: timeout called immediately prior to procedure    Prep: patient was prepped and draped in usual sterile fashion       PROCEDURE    Acromioclavicular Injection - Ultrasound Guided    The patient was informed of the risks and the benefits of the procedure and a written consent was signed.    The patient s left acromioclavicular joint was prepped with chlorhexidine in sterile fashion.   40 mg of triamcinolone suspension was drawn up into a 3 mL syringe with 1 mL of 1% lidocaine.  Injection was performed using sterile technique.  Under ultrasound guidance a 1.5\" 22-gauge needle was used to enter the left acromioclavicular joint.  Needle placement was visualized and documented with ultrasound.  Needle placed in short axis to the probe.  Ultrasound visualization was necessary due to decreased joint space in the setting of osteoarthritis.  Images were permanently stored for the patient's record.  There were no complications. The patient tolerated the procedure well. There was negligible bleeding.   The patient was instructed to ice the shoulder upon leaving clinic and refrain from overuse over the next 3 days.   The patient was instructed to call or go to the emergency room with any unusual pain, swelling, redness, or if otherwise concerned.              "

## 2020-10-27 ASSESSMENT — ENCOUNTER SYMPTOMS
SORE THROAT: 0
PALPITATIONS: 0
ABDOMINAL PAIN: 0
DYSURIA: 0
CHILLS: 0
FEVER: 0
WEAKNESS: 0
EYE PAIN: 0
SHORTNESS OF BREATH: 0
MYALGIAS: 0
HEARTBURN: 1
HEADACHES: 0
NAUSEA: 1
NERVOUS/ANXIOUS: 0
COUGH: 0
HEMATOCHEZIA: 0
PARESTHESIAS: 0
HEMATURIA: 0
FREQUENCY: 0
DIARRHEA: 0
ARTHRALGIAS: 0
CONSTIPATION: 0
JOINT SWELLING: 0
DIZZINESS: 0

## 2020-10-28 ENCOUNTER — OFFICE VISIT (OUTPATIENT)
Dept: FAMILY MEDICINE | Facility: CLINIC | Age: 50
End: 2020-10-28
Payer: COMMERCIAL

## 2020-10-28 VITALS
BODY MASS INDEX: 26.82 KG/M2 | DIASTOLIC BLOOD PRESSURE: 79 MMHG | SYSTOLIC BLOOD PRESSURE: 119 MMHG | TEMPERATURE: 97.1 F | RESPIRATION RATE: 16 BRPM | WEIGHT: 198 LBS | HEART RATE: 71 BPM | OXYGEN SATURATION: 96 % | HEIGHT: 72 IN

## 2020-10-28 DIAGNOSIS — R11.0 NAUSEA: ICD-10-CM

## 2020-10-28 DIAGNOSIS — Z13.6 CARDIOVASCULAR SCREENING; LDL GOAL LESS THAN 130: ICD-10-CM

## 2020-10-28 DIAGNOSIS — Z12.11 SCREEN FOR COLON CANCER: ICD-10-CM

## 2020-10-28 DIAGNOSIS — G43.019 INTRACTABLE MIGRAINE WITHOUT AURA AND WITHOUT STATUS MIGRAINOSUS: ICD-10-CM

## 2020-10-28 DIAGNOSIS — Z11.59 NEED FOR HEPATITIS C SCREENING TEST: ICD-10-CM

## 2020-10-28 DIAGNOSIS — Z00.00 ROUTINE GENERAL MEDICAL EXAMINATION AT A HEALTH CARE FACILITY: Primary | ICD-10-CM

## 2020-10-28 DIAGNOSIS — Z11.4 SCREENING FOR HIV (HUMAN IMMUNODEFICIENCY VIRUS): ICD-10-CM

## 2020-10-28 DIAGNOSIS — S46.812S STRAIN OF LEFT TRAPEZIUS MUSCLE, SEQUELA: ICD-10-CM

## 2020-10-28 DIAGNOSIS — Z23 NEED FOR PROPHYLACTIC VACCINATION AND INOCULATION AGAINST INFLUENZA: ICD-10-CM

## 2020-10-28 DIAGNOSIS — Z23 NEED FOR SHINGLES VACCINE: ICD-10-CM

## 2020-10-28 DIAGNOSIS — B36.0 TINEA VERSICOLOR: ICD-10-CM

## 2020-10-28 LAB
ALBUMIN SERPL-MCNC: 3.9 G/DL (ref 3.4–5)
ALP SERPL-CCNC: 110 U/L (ref 40–150)
ALT SERPL W P-5'-P-CCNC: 52 U/L (ref 0–70)
ANION GAP SERPL CALCULATED.3IONS-SCNC: 3 MMOL/L (ref 3–14)
AST SERPL W P-5'-P-CCNC: 25 U/L (ref 0–45)
BASOPHILS # BLD AUTO: 0 10E9/L (ref 0–0.2)
BASOPHILS NFR BLD AUTO: 0.4 %
BILIRUB SERPL-MCNC: 0.5 MG/DL (ref 0.2–1.3)
BUN SERPL-MCNC: 15 MG/DL (ref 7–30)
CALCIUM SERPL-MCNC: 9.1 MG/DL (ref 8.5–10.1)
CHLORIDE SERPL-SCNC: 99 MMOL/L (ref 94–109)
CHOLEST SERPL-MCNC: 185 MG/DL
CO2 SERPL-SCNC: 31 MMOL/L (ref 20–32)
CREAT SERPL-MCNC: 0.9 MG/DL (ref 0.66–1.25)
DIFFERENTIAL METHOD BLD: ABNORMAL
EOSINOPHIL # BLD AUTO: 0.2 10E9/L (ref 0–0.7)
EOSINOPHIL NFR BLD AUTO: 4.9 %
ERYTHROCYTE [DISTWIDTH] IN BLOOD BY AUTOMATED COUNT: 11.9 % (ref 10–15)
GFR SERPL CREATININE-BSD FRML MDRD: >90 ML/MIN/{1.73_M2}
GLUCOSE SERPL-MCNC: 87 MG/DL (ref 70–99)
HCT VFR BLD AUTO: 42.7 % (ref 40–53)
HDLC SERPL-MCNC: 45 MG/DL
HGB BLD-MCNC: 14.7 G/DL (ref 13.3–17.7)
LDLC SERPL CALC-MCNC: 120 MG/DL
LYMPHOCYTES # BLD AUTO: 1.3 10E9/L (ref 0.8–5.3)
LYMPHOCYTES NFR BLD AUTO: 26.4 %
MCH RBC QN AUTO: 30 PG (ref 26.5–33)
MCHC RBC AUTO-ENTMCNC: 34.4 G/DL (ref 31.5–36.5)
MCV RBC AUTO: 87 FL (ref 78–100)
MONOCYTES # BLD AUTO: 0.5 10E9/L (ref 0–1.3)
MONOCYTES NFR BLD AUTO: 9.8 %
NEUTROPHILS # BLD AUTO: 2.9 10E9/L (ref 1.6–8.3)
NEUTROPHILS NFR BLD AUTO: 58.5 %
NONHDLC SERPL-MCNC: 140 MG/DL
PLATELET # BLD AUTO: 108 10E9/L (ref 150–450)
POTASSIUM SERPL-SCNC: 3.7 MMOL/L (ref 3.4–5.3)
PROT SERPL-MCNC: 8 G/DL (ref 6.8–8.8)
RBC # BLD AUTO: 4.9 10E12/L (ref 4.4–5.9)
SODIUM SERPL-SCNC: 133 MMOL/L (ref 133–144)
TRIGL SERPL-MCNC: 102 MG/DL
WBC # BLD AUTO: 4.9 10E9/L (ref 4–11)

## 2020-10-28 PROCEDURE — 90750 HZV VACC RECOMBINANT IM: CPT | Performed by: INTERNAL MEDICINE

## 2020-10-28 PROCEDURE — 90682 RIV4 VACC RECOMBINANT DNA IM: CPT | Performed by: INTERNAL MEDICINE

## 2020-10-28 PROCEDURE — 87389 HIV-1 AG W/HIV-1&-2 AB AG IA: CPT | Performed by: INTERNAL MEDICINE

## 2020-10-28 PROCEDURE — 90471 IMMUNIZATION ADMIN: CPT | Performed by: INTERNAL MEDICINE

## 2020-10-28 PROCEDURE — 80061 LIPID PANEL: CPT | Performed by: INTERNAL MEDICINE

## 2020-10-28 PROCEDURE — 36415 COLL VENOUS BLD VENIPUNCTURE: CPT | Performed by: INTERNAL MEDICINE

## 2020-10-28 PROCEDURE — 80053 COMPREHEN METABOLIC PANEL: CPT | Performed by: INTERNAL MEDICINE

## 2020-10-28 PROCEDURE — 99396 PREV VISIT EST AGE 40-64: CPT | Mod: 25 | Performed by: INTERNAL MEDICINE

## 2020-10-28 PROCEDURE — 90472 IMMUNIZATION ADMIN EACH ADD: CPT | Performed by: INTERNAL MEDICINE

## 2020-10-28 PROCEDURE — 85025 COMPLETE CBC W/AUTO DIFF WBC: CPT | Performed by: INTERNAL MEDICINE

## 2020-10-28 PROCEDURE — 86803 HEPATITIS C AB TEST: CPT | Performed by: INTERNAL MEDICINE

## 2020-10-28 RX ORDER — METOCLOPRAMIDE HYDROCHLORIDE 5 MG/1
1 TABLET, ORALLY DISINTEGRATING ORAL 4 TIMES DAILY PRN
Qty: 30 TABLET | Refills: 11 | Status: SHIPPED | OUTPATIENT
Start: 2020-10-28 | End: 2021-01-18

## 2020-10-28 RX ORDER — KETOCONAZOLE 20 MG/G
CREAM TOPICAL DAILY
Qty: 30 G | Refills: 11 | Status: SHIPPED | OUTPATIENT
Start: 2020-10-28 | End: 2021-01-18

## 2020-10-28 RX ORDER — RIZATRIPTAN BENZOATE 10 MG/1
10 TABLET ORAL
Qty: 18 TABLET | Refills: 11 | Status: SHIPPED | OUTPATIENT
Start: 2020-10-28 | End: 2021-01-18

## 2020-10-28 RX ORDER — BACLOFEN 10 MG/1
10 TABLET ORAL 3 TIMES DAILY PRN
Qty: 30 TABLET | Refills: 11 | Status: SHIPPED | OUTPATIENT
Start: 2020-10-28 | End: 2021-11-05

## 2020-10-28 ASSESSMENT — ENCOUNTER SYMPTOMS
DYSURIA: 0
CHILLS: 0
FEVER: 0
ARTHRALGIAS: 0
WEAKNESS: 0
HEMATOCHEZIA: 0
JOINT SWELLING: 0
DIARRHEA: 0
SORE THROAT: 0
NAUSEA: 1
ABDOMINAL PAIN: 0
NERVOUS/ANXIOUS: 0
FREQUENCY: 0
EYE PAIN: 0
SHORTNESS OF BREATH: 0
MYALGIAS: 0
HEMATURIA: 0
PALPITATIONS: 0
DIZZINESS: 0
PARESTHESIAS: 0
CONSTIPATION: 0
COUGH: 0
HEADACHES: 0
HEARTBURN: 1

## 2020-10-28 ASSESSMENT — MIFFLIN-ST. JEOR: SCORE: 1783.18

## 2020-10-28 ASSESSMENT — PAIN SCALES - GENERAL: PAINLEVEL: MILD PAIN (2)

## 2020-10-28 NOTE — NURSING NOTE
1.  Has the patient received the information for the Zostavax vaccine? YES    2.  Does the patient have any of the following contraindications?     Allergy to Neomycin or Gelatin? No       Current moderate or severe illness? No  Temp = 97.1  If temp > 99.0 degrees orally or patient has above allergies, vaccine is deferred    3.  The vaccine has been administered in the usual fashion and the patient was instructed to wait 15 minutes before leaving the building in the event of an allergic reaction: YES    Vaccination given by Phoenix Paul MA on 10/28/2020 at 8:05 AM.  Recorded by Phoenix Paul MA

## 2020-10-28 NOTE — PATIENT INSTRUCTIONS
Preventive Health Recommendations  Male Ages 50 - 64    Yearly exam:             See your health care provider every year in order to  o   Review health changes.   o   Discuss preventive care.    o   Review your medicines if your doctor has prescribed any.     Have a cholesterol test every 5 years, or more frequently if you are at risk for high cholesterol/heart disease.     Have a diabetes test (fasting glucose) every three years. If you are at risk for diabetes, you should have this test more often.     Have a colonoscopy at age 50, or have a yearly FIT test (stool test). These exams will check for colon cancer.      Talk with your health care provider about whether or not a prostate cancer screening test (PSA) is right for you.    You should be tested each year for STDs (sexually transmitted diseases), if you re at risk.     Shots: Get a flu shot each year. Get a tetanus shot every 10 years.     Nutrition:    Eat at least 5 servings of fruits and vegetables daily.     Eat whole-grain bread, whole-wheat pasta and brown rice instead of white grains and rice.     Get adequate Calcium and Vitamin D.     Lifestyle    Exercise for at least 150 minutes a week (30 minutes a day, 5 days a week). This will help you control your weight and prevent disease.     Limit alcohol to one drink per day.     No smoking.     Wear sunscreen to prevent skin cancer.     See your dentist every six months for an exam and cleaning.     See your eye doctor every 1 to 2 years.  At [facility], we strive to deliver an exceptional experience to you, every time we see you. If you receive a survey, please complete it as we do value your feedback.  If you have [employer], you can expect to receive results automatically within 24 hours of their completion.  Your provider will send a note interpreting your results as well.   If you do not have [facility], you should receive your results in about a week by mail.    Your  care team:                            Family Medicine Internal Medicine   MD Rocky Rodriguez, MD Cherry Lujan, MD Danelle Lindsay PA-C, APRN ANNALEE Birmingham, MD Pediatrics   Sean Hernandez, PADatC  Fozia Hilliard, MD Nargis Betancourt APRN CNP   Janice Smith, MD Rosalinda Rutledge, MD Suri Fierro, MD Traci Fleming, APRN CNP  Itzel Stiles, PABETHEL Roca, MD Claudine Romeo MD Angela Wermerskirchen, MD      Clinic hours: Monday - Thursday 7 am-7 pm; Fridays 7 am-5 pm.   Urgent care: Monday - Friday 11 am-9 pm; Saturday and Sunday 9 am-5 pm.    Clinic: [phone]       [facility]: Monday - Thursday 8 am - 7 pm; Friday 8 am - 6 pm  [facility]: [phone]     Use [email or web address] for 24/7 diagnosis and treatment of dozens of conditions.

## 2020-10-28 NOTE — PROGRESS NOTES
SUBJECTIVE:   CC: Conor Contreras is an 50 year old male who presents for preventative health visit.     Patient has been advised of split billing requirements and indicates understanding: Yes  Healthy Habits:     Getting at least 3 servings of Calcium per day:  Yes    Bi-annual eye exam:  Yes    Dental care twice a year:  Yes    Sleep apnea or symptoms of sleep apnea:  Sleep apnea    Diet:  Regular (no restrictions)    Frequency of exercise:  2-3 days/week    Duration of exercise:  Less than 15 minutes    Taking medications regularly:  Yes    Medication side effects:  Not applicable    PHQ-2 Total Score: 1    Additional concerns today:  Yes    Today's PHQ-2 Score:   PHQ-2 (  [employer]) 10/27/2020   Q1: Little interest or pleasure in doing things 1   Q2: Feeling down, depressed or hopeless 0   PHQ-2 Score 1   Q1: Little interest or pleasure in doing things Several days   Q2: Feeling down, depressed or hopeless Not at all   PHQ-2 Score 1       Abuse: Current or Past(Physical, Sexual or Emotional)- No  Do you feel safe in your environment? Yes        Social History     Tobacco Use     Smoking status: Former Smoker     Packs/day: 0.50     Years: 10.00     Pack years: 5.00     Types: Cigarettes     Quit date: 3/18/2001     Years since quittin.6     Smokeless tobacco: Never Used     Tobacco comment: QUIT 9 YEARS AGO   Substance Use Topics     Alcohol use: Yes     Alcohol/week: 0.0 standard drinks     Comment: very rarely     If you drink alcohol do you typically have >3 drinks per day or >7 drinks per week? No    Alcohol Use 10/27/2020   Prescreen: >3 drinks/day or >7 drinks/week? Not Applicable   Prescreen: >3 drinks/day or >7 drinks/week? -   No flowsheet data found.    Last PSA:   PSA   Date Value Ref Range Status   2019 0.32 0 - 4 ug/L Final     Comment:     Assay Method:  Chemiluminescence using Siemens Vista analyzer       Reviewed orders with patient. Reviewed health maintenance and updated orders  accordingly - Yes  Labs reviewed in EPIC  BP Readings from Last 3 Encounters:   10/28/20 119/79   20 116/80   19 115/79    Wt Readings from Last 3 Encounters:   10/28/20 89.8 kg (198 lb)   20 93 kg (205 lb)   19 93 kg (205 lb)                  Patient Active Problem List   Diagnosis     ADD (attention deficit disorder)     HARLAN (obstructive sleep apnea)     Hyperlipidemia LDL goal <160     Generalized anxiety disorder     Benign skin lesion of face     Dysthymia     Allergic rhinitis     Abnormal CXR perihilar nodes on CT chest     History of drug abuse (H)     Palpitations     Mediastinal lymphadenopathy     Cervical myofascial strain, sequela     Left shoulder pain     Impingement syndrome, shoulder, left     Past Surgical History:   Procedure Laterality Date     ENDOBRONCHIAL ULTRASOUND FLEXIBLE N/A 2015    Procedure: ENDOBRONCHIAL ULTRASOUND FLEXIBLE;  Surgeon: Ramakrishna Williamson MD;  Location:  GI     VASECTOMY         Social History     Tobacco Use     Smoking status: Former Smoker     Packs/day: 0.50     Years: 10.00     Pack years: 5.00     Types: Cigarettes     Quit date: 3/18/2001     Years since quittin.6     Smokeless tobacco: Never Used     Tobacco comment: QUIT 9 YEARS AGO   Substance Use Topics     Alcohol use: Yes     Alcohol/week: 0.0 standard drinks     Comment: very rarely     Family History   Problem Relation Age of Onset     C.A.D. Maternal Grandfather      Alcohol/Drug Maternal Grandfather          from alcoholism     Cerebrovascular Disease Maternal Grandfather      Lipids Father      Cancer Maternal Grandmother         Pancreatic     Cancer Paternal Grandmother         Lung, may not have been primary     Heart Disease Paternal Grandfather      Anxiety Disorder Sister      Anxiety Disorder Sister      LUNG DISEASE Maternal Uncle         Asthma versus sarcoid     Asthma No family hx of      Diabetes No family hx of      Hypertension No family hx of       Breast Cancer No family hx of      Cancer - colorectal No family hx of      Prostate Cancer No family hx of      Connective Tissue Disorder No family hx of      Thyroid Disease No family hx of          No Known Allergies  Recent Labs   Lab Test 10/28/20  0808 09/23/19  0838 04/05/16  1147 01/07/15  0829   * 124*  --  122   HDL 45 39*  --  50   TRIG 102 79  --  114   ALT 52 50 67 56   CR 0.90 0.88  --  0.97   GFRESTIMATED >90 >90  --  83   GFRESTBLACK >90 >90  --  >90  African American GFR Calc     POTASSIUM 3.7 4.0  --  3.7   TSH  --   --   --  1.46        Reviewed and updated as needed this visit by clinical staff                 Reviewed and updated as needed this visit by Provider                    Review of Systems   Constitutional: Negative for chills and fever.   HENT: Negative for congestion, ear pain, hearing loss and sore throat.    Eyes: Negative for pain and visual disturbance.   Respiratory: Negative for cough and shortness of breath.    Cardiovascular: Negative for chest pain, palpitations and peripheral edema.   Gastrointestinal: Positive for heartburn and nausea. Negative for abdominal pain, constipation, diarrhea and hematochezia.   Genitourinary: Negative for discharge, dysuria, frequency, genital sores, hematuria, impotence and urgency.   Musculoskeletal: Negative for arthralgias, joint swelling and myalgias.   Skin: Negative for rash.   Neurological: Negative for dizziness, weakness, headaches and paresthesias.   Psychiatric/Behavioral: Positive for mood changes. The patient is not nervous/anxious.      CONSTITUTIONAL: NEGATIVE for fever, chills, change in weight  INTEGUMENTARY/SKIN: NEGATIVE for worrisome rashes, moles or lesions  EYES: NEGATIVE for vision changes or irritation  ENT: NEGATIVE for ear, mouth and throat problems  RESP: NEGATIVE for significant cough or SOB  CV: NEGATIVE for chest pain, palpitations or peripheral edema  GI: NEGATIVE for nausea, abdominal pain, heartburn,  or change in bowel habits   male: negative for dysuria, hematuria, decreased urinary stream, erectile dysfunction, urethral discharge  MUSCULOSKELETAL: NEGATIVE for significant arthralgias or myalgia  NEURO: NEGATIVE for weakness, dizziness or paresthesias  PSYCHIATRIC: NEGATIVE for changes in mood or affect    OBJECTIVE:   There were no vitals taken for this visit.    Physical Exam  GENERAL: healthy, alert and no distress  EYES: Eyes grossly normal to inspection, PERRL and conjunctivae and sclerae normal  HENT: ear canals and TM's normal, nose and mouth without ulcers or lesions  NECK: no adenopathy, no asymmetry, masses, or scars and thyroid normal to palpation  RESP: lungs clear to auscultation - no rales, rhonchi or wheezes  CV: regular rate and rhythm, normal S1 S2, no S3 or S4, no murmur, click or rub, no peripheral edema and peripheral pulses strong  ABDOMEN: soft, nontender, no hepatosplenomegaly, no masses and bowel sounds normal  MS: no gross musculoskeletal defects noted, no edema  SKIN: no suspicious lesions or rashes  NEURO: Normal strength and tone, mentation intact and speech normal  PSYCH: mentation appears normal, affect normal/bright    Diagnostic Test Results:  Results for orders placed or performed in visit on 10/28/20   HIV Antigen Antibody Combo     Status: None   Result Value Ref Range    HIV Antigen Antibody Combo Nonreactive NR^Nonreactive       Hepatitis C Screen Reflex to HCV RNA Quant and Genotype     Status: None   Result Value Ref Range    Hepatitis C Antibody Nonreactive NR^Nonreactive   Lipid panel reflex to direct LDL Fasting     Status: Abnormal   Result Value Ref Range    Cholesterol 185 <200 mg/dL    Triglycerides 102 <150 mg/dL    HDL Cholesterol 45 >39 mg/dL    LDL Cholesterol Calculated 120 (H) <100 mg/dL    Non HDL Cholesterol 140 (H) <130 mg/dL   CBC with platelets and differential     Status: Abnormal   Result Value Ref Range    WBC 4.9 4.0 - 11.0 10e9/L    RBC Count 4.90  4.4 - 5.9 10e12/L    Hemoglobin 14.7 13.3 - 17.7 g/dL    Hematocrit 42.7 40.0 - 53.0 %    MCV 87 78 - 100 fl    MCH 30.0 26.5 - 33.0 pg    MCHC 34.4 31.5 - 36.5 g/dL    RDW 11.9 10.0 - 15.0 %    Platelet Count 108 (L) 150 - 450 10e9/L    % Neutrophils 58.5 %    % Lymphocytes 26.4 %    % Monocytes 9.8 %    % Eosinophils 4.9 %    % Basophils 0.4 %    Absolute Neutrophil 2.9 1.6 - 8.3 10e9/L    Absolute Lymphocytes 1.3 0.8 - 5.3 10e9/L    Absolute Monocytes 0.5 0.0 - 1.3 10e9/L    Absolute Eosinophils 0.2 0.0 - 0.7 10e9/L    Absolute Basophils 0.0 0.0 - 0.2 10e9/L    Diff Method Automated Method    Comprehensive metabolic panel (BMP + Alb, Alk Phos, ALT, AST, Total. Bili, TP)     Status: None   Result Value Ref Range    Sodium 133 133 - 144 mmol/L    Potassium 3.7 3.4 - 5.3 mmol/L    Chloride 99 94 - 109 mmol/L    Carbon Dioxide 31 20 - 32 mmol/L    Anion Gap 3 3 - 14 mmol/L    Glucose 87 70 - 99 mg/dL    Urea Nitrogen 15 7 - 30 mg/dL    Creatinine 0.90 0.66 - 1.25 mg/dL    GFR Estimate >90 >60 mL/min/[1.73_m2]    GFR Estimate If Black >90 >60 mL/min/[1.73_m2]    Calcium 9.1 8.5 - 10.1 mg/dL    Bilirubin Total 0.5 0.2 - 1.3 mg/dL    Albumin 3.9 3.4 - 5.0 g/dL    Protein Total 8.0 6.8 - 8.8 g/dL    Alkaline Phosphatase 110 40 - 150 U/L    ALT 52 0 - 70 U/L    AST 25 0 - 45 U/L       ASSESSMENT/PLAN:   1. Routine general medical examination at a health care facility    - Lipid panel reflex to direct LDL Fasting  - CBC with platelets and differential  - Comprehensive metabolic panel (BMP + Alb, Alk Phos, ALT, AST, Total. Bili, TP)    2. Screen for colon cancer    - Fecal colorectal cancer screen FIT - Future (S+30); Future    3. Screening for HIV (human immunodeficiency virus)    - HIV Antigen Antibody Combo    4. Need for hepatitis C screening test    - Hepatitis C Screen Reflex to HCV RNA Quant and Genotype    5. CARDIOVASCULAR SCREENING; LDL GOAL LESS THAN 130    - Lipid panel reflex to direct LDL Fasting    6. Need  "for prophylactic vaccination and inoculation against influenza    - MA VACCINE ADMINISTRATION, INITIAL  - C RIV4 (FLUBLOK) VACCINE RECOMBINANT DNA PRSRV ANTIBIO FREE, IM [[id number]]    7. Need for shingles vaccine    - MA VACCINE ADMINISTRATION, EACH ADDITIONAL  - ZOSTER VACCINE RECOMBINANT ADJUVANTED IM NJX    8. Tinea versicolor    - ketoconazole (NIZORAL) 2 % external cream; Apply topically daily  Dispense: 30 g; Refill: 11    9. Strain of left trapezius muscle, sequela    - baclofen (LIORESAL) 10 MG tablet; Take 1 tablet (10 mg) by mouth 3 times daily as needed for muscle spasms  Dispense: 30 tablet; Refill: 11    10. Intractable migraine without aura and without status migrainosus    - rizatriptan (MAXALT) 10 MG tablet; Take 1 tablet (10 mg) by mouth at onset of headache for migraine May repeat in 2 hours. Max 2 tablets/24 hours.  Dispense: 18 tablet; Refill: 11    11. Nausea    - Metoclopramide HCl 5 MG TBDP; Take 1 tablet by mouth 4 times daily as needed (nausea)  Dispense: 30 tablet; Refill: 11    Patient has been advised of split billing requirements and indicates understanding: Yes  COUNSELING:   Special attention given to:        Regular exercise       Healthy diet/nutrition       Immunizations    Vaccinated for: Influenza             Consider Hep C screening for all patients one time for ages 18-79 years       Colon cancer screening       The 10-year ASCVD risk score (Mary Kate LAZAR Jr., et al., 2013) is: 3.4%    Values used to calculate the score:      Age: 50 years      Sex: Male      Is Non- : No      Diabetic: No      Tobacco smoker: No      Systolic Blood Pressure: 119 mmHg      Is BP treated: No      HDL Cholesterol: 45 mg/dL      Total Cholesterol: 185 mg/dL    Estimated body mass index is 28.19 kg/m  as calculated from the following:    Height as of 7/20/20: 1.816 m (5' 11.5\").    Weight as of 7/20/20: 93 kg (205 lb).     Weight management plan: diet and exercise    He reports that " he quit smoking about 19 years ago. His smoking use included cigarettes. He has a 5.00 pack-year smoking history. He has never used smokeless tobacco.      Counseling Resources:  ATP IV Guidelines  Pooled Cohorts Equation Calculator  FRAX Risk Assessment  ICSI Preventive Guidelines  Dietary Guidelines for Americans, 2010  USDA's MyPlate  ASA Prophylaxis  Lung CA Screening    Rocky Yi MD  [facility]

## 2020-10-29 LAB
HCV AB SERPL QL IA: NONREACTIVE
HIV 1+2 AB+HIV1 P24 AG SERPL QL IA: NONREACTIVE

## 2020-11-10 ENCOUNTER — MYC MEDICAL ADVICE (OUTPATIENT)
Dept: FAMILY MEDICINE | Facility: CLINIC | Age: 50
End: 2020-11-10

## 2020-11-10 PROBLEM — M25.512 LEFT SHOULDER PAIN: Status: RESOLVED | Noted: 2020-06-16 | Resolved: 2020-11-10

## 2020-11-10 PROBLEM — M75.42 IMPINGEMENT SYNDROME, SHOULDER, LEFT: Status: RESOLVED | Noted: 2020-06-16 | Resolved: 2020-11-10

## 2020-11-10 NOTE — PROGRESS NOTES
Discharge Note    Progress reporting period is from last progress note on   to Jun 16, 2020.    Conor failed to follow up and current status is unknown.  Please see information below for last relevant information on current status.  Patient seen for 1 visits.    SUBJECTIVE  Subjective changes noted by patient:     .  Current pain level is 4/10.     Previous pain level was   .   Changes in function:  Yes (See Goal flowsheet attached for changes in current functional level)  Adverse reaction to treatment or activity: None    OBJECTIVE  Changes noted in objective findings:       ASSESSMENT/PLAN  Diagnosis: Left shoulder pain- Impingement syndrome   Updated problem list and treatment plan:     STG/LTGs have been met or progress has been made towards goals:  Yes, please see goal flowsheet for most current information  Assessment of Progress: current status is unknown.    Last current status:     Self Management Plans:  HEP  I have re-evaluated this patient and find that the nature, scope, duration and intensity of the therapy is appropriate for the medical condition of the patient.  Conor continues to require the following intervention to meet STG and LTG's:  HEP.    Recommendations:  Discharge with current home program.  Patient to follow up with MD as needed.    Please refer to the daily flowsheet for treatment today, total treatment time and time spent performing 1:1 timed codes.

## 2021-01-14 ENCOUNTER — MYC MEDICAL ADVICE (OUTPATIENT)
Dept: FAMILY MEDICINE | Facility: CLINIC | Age: 51
End: 2021-01-14

## 2021-01-18 ENCOUNTER — VIRTUAL VISIT (OUTPATIENT)
Dept: FAMILY MEDICINE | Facility: CLINIC | Age: 51
End: 2021-01-18
Payer: COMMERCIAL

## 2021-01-18 DIAGNOSIS — M79.674 PAIN OF TOE OF RIGHT FOOT: Primary | ICD-10-CM

## 2021-01-18 DIAGNOSIS — R21 FACIAL RASH: ICD-10-CM

## 2021-01-18 PROCEDURE — 99213 OFFICE O/P EST LOW 20 MIN: CPT | Mod: 95 | Performed by: INTERNAL MEDICINE

## 2021-01-18 NOTE — PROGRESS NOTES
Khoa is a 50 year old who is being evaluated via a billable video visit.      How would you like to obtain your AVS? MyChart  If the video visit is dropped, the invitation should be resent by: Text to cell phone: [phone]  Will anyone else be joining your video visit? No    Video Start Time: 8:05 AM    [facility] Internal Medicine Progress Note           Assessment and Plan:   1. Pain of great toe of right foot  - Uric acid; Standing  - Anti Nuclear Pretty IgG by IFA with Reflex; Standing    2. Facial rash  - DERMATOLOGY ADULT REFERRAL; Future       26 minutes spent on the date of the encounter doing chart review, patient visit and documentation       Interval History:     Joint or Musculoskeletal Pain  Duration of complaint: subacute   Description:   Location: right great toe  Character: Dull ache  Intensity: mild, moderate  Progression of Symptoms: worse  Accompanying Signs & Symptoms: Other symptoms: swelling  History: Previous similar pain: no     Precipitating factors: Trauma or overuse: none  Alleviating factors: Improved by: nothing  Therapies Tried and outcome: none      Rash      Duration/timing: chronic    Description  Location:face (right maxillary)  Itching: no  Pain: no  Discharge: no  Redness: yes  Scaling: no    Severity:  mild    Accompanying signs and symptoms:            Fever/chills: no           Abdominal pain: no           Morning stiffness: no           Weakness: no    History (similar episodes/previous evaluation):             Trauma: no            Surgery: no            Allergic reaction:no            Skin cancer: no, but patient is worried about it.    Precipitating or alleviating factors:  New exposures:  no  Recent travel: no    Therapies tried and outcome:            Topical preparations: no           Antihistamines: no           Steroids:no           UV light treatment: no                          Significant Problems:   Patient Active Problem List   Diagnosis     ADD  (attention deficit disorder)     HARLAN (obstructive sleep apnea)     Hyperlipidemia LDL goal <160     Generalized anxiety disorder     Benign skin lesion of face     Dysthymia     Allergic rhinitis     Abnormal CXR perihilar nodes on CT chest     Palpitations     Mediastinal lymphadenopathy     Cervical myofascial strain, sequela              Review of Systems:   CONSTITUTIONAL: NEGATIVE for fever, chills, change in weight  INTEGUMENTARY/SKIN: As above.  EYES: NEGATIVE for vision changes or irritation  ENT/MOUTH: NEGATIVE for ear, mouth and throat problems  RESP: NEGATIVE for significant cough or SOB  CV: NEGATIVE for chest pain, palpitations or peripheral edema  GI: NEGATIVE for nausea, abdominal pain, heartburn, or change in bowel habits  : NEGATIVE for frequency, dysuria, or hematuria  MUSCULOSKELETAL:As above.  NEURO: NEGATIVE for weakness, dizziness or paresthesias  ENDOCRINE: NEGATIVE for temperature intolerance, skin/hair changes  HEME: NEGATIVE for bleeding problems  PSYCHIATRIC: NEGATIVE for changes in mood or affect            Medications:     Current Outpatient Medications   Medication Sig     amphetamine-dextroamphetamine (ADDERALL XR) 15 MG per 24 hr capsule TAKE ONE CAPSULE BY MOUTH EVERY MORNING PLEASE MAKE AN APPOINTMENT FOR FUTURE REFILLS     baclofen (LIORESAL) 10 MG tablet Take 1 tablet (10 mg) by mouth 3 times daily as needed for muscle spasms     cetirizine (ZYRTEC) 10 MG tablet Take 1 tablet (10 mg) by mouth At Bedtime     venlafaxine (EFFEXOR XR) 150 MG 24 hr capsule Take 150 mg by mouth daily     No current facility-administered medications for this visit.              Physical Exam:   Vital signs not obtained due to nature of visit.    Constitutional: Awake, alert, cooperative, no apparent distress, and appears stated age.  Eyes: extra-ocular muscles intact and sclera clear  ENT: normocepalic, without obvious abnormality  Lungs: no increased work of breathing and no  retractions  Musculoskeletal: No erythema nor swelling of the right great toe.  Neurologic: Mental Status Exam:  Level of Alertness:   awake  Orientation:   person, place, time  Memory:   normal  Fund of Knowledge:  normal  Attention/Concentration:  normal  Language:  normal  Neuropsychiatric: Affect: normal          Data:   All laboratory data reviewed         Disposition: Follow-up in 4 weeks.      Rocky Yi MD  Internal Medicine  [facility] Team    Video-Visit Details    Type of service:  Video Visit    Video End Time:Doximmark    Originating Location (pt. Location): Home    Distant Location (provider location):  [facility]     Platform used for Video Visit: [facility]

## 2021-01-19 ENCOUNTER — TELEPHONE (OUTPATIENT)
Dept: DERMATOLOGY | Facility: CLINIC | Age: 51
End: 2021-01-19

## 2021-01-19 NOTE — TELEPHONE ENCOUNTER
[facility] Call Center    Phone Message    May a detailed message be left on voicemail: yes     Reason for Call: Appointment Intake    Referring Provider Name: Dr Hidalgo Vocal  Diagnosis and/or Symptoms: Facial rash    Action Taken: Message routed to:  Adult Clinics: Dermatology p [phone]    Travel Screening: Not Applicable

## 2021-01-19 NOTE — TELEPHONE ENCOUNTER
Patient has been scheduled.      Lexie Yang  Surgical Specialties Procedure   [facility] [place]  11/17/2020 8:33 AM

## 2021-01-19 NOTE — TELEPHONE ENCOUNTER
Flakne, Aneta, LPN  [facility] Procedure Coordinator 49 minutes ago (12:11 PM)     Please schedule virtual visit with Alber Lopez or Parul Richter

## 2021-01-25 DIAGNOSIS — M79.674 PAIN OF TOE OF RIGHT FOOT: ICD-10-CM

## 2021-01-25 LAB — URATE SERPL-MCNC: 5.8 MG/DL (ref 3.5–7.2)

## 2021-01-25 PROCEDURE — 86038 ANTINUCLEAR ANTIBODIES: CPT | Performed by: INTERNAL MEDICINE

## 2021-01-25 PROCEDURE — 36415 COLL VENOUS BLD VENIPUNCTURE: CPT | Performed by: INTERNAL MEDICINE

## 2021-01-25 PROCEDURE — 84550 ASSAY OF BLOOD/URIC ACID: CPT | Performed by: INTERNAL MEDICINE

## 2021-01-26 LAB — ANA SER QL IF: NEGATIVE

## 2021-02-16 ENCOUNTER — MYC MEDICAL ADVICE (OUTPATIENT)
Dept: FAMILY MEDICINE | Facility: CLINIC | Age: 51
End: 2021-02-16

## 2021-02-20 ENCOUNTER — ANCILLARY PROCEDURE (OUTPATIENT)
Dept: GENERAL RADIOLOGY | Facility: CLINIC | Age: 51
End: 2021-02-20
Attending: INTERNAL MEDICINE
Payer: COMMERCIAL

## 2021-02-20 DIAGNOSIS — M79.674 PAIN OF TOE OF RIGHT FOOT: ICD-10-CM

## 2021-02-20 PROCEDURE — 73660 X-RAY EXAM OF TOE(S): CPT | Mod: RT | Performed by: RADIOLOGY

## 2021-02-23 PROCEDURE — 82274 ASSAY TEST FOR BLOOD FECAL: CPT | Performed by: INTERNAL MEDICINE

## 2021-02-24 ENCOUNTER — VIRTUAL VISIT (OUTPATIENT)
Dept: DERMATOLOGY | Facility: CLINIC | Age: 51
End: 2021-02-24
Attending: INTERNAL MEDICINE
Payer: COMMERCIAL

## 2021-02-24 DIAGNOSIS — Z12.11 SCREEN FOR COLON CANCER: ICD-10-CM

## 2021-02-24 DIAGNOSIS — D48.5 NEOPLASM OF UNCERTAIN BEHAVIOR OF SKIN: Primary | ICD-10-CM

## 2021-02-24 LAB — HEMOCCULT STL QL IA: NEGATIVE

## 2021-02-24 PROCEDURE — 99202 OFFICE O/P NEW SF 15 MIN: CPT | Mod: TEL | Performed by: DERMATOLOGY

## 2021-02-24 NOTE — PATIENT INSTRUCTIONS
[facility] Dermatology Visit    Thank you for allowing us to participate in your care. Your findings, instructions and follow-up plan are as follows:     In person evaluation of skin lesions - we will call you to set up.    When should I call my doctor?    If you are worsening or not improving, please, contact us or seek urgent care as noted below.     Who should I call with questions (adults)?    [facility] (adult and pediatric): [phone]     [facility] (adult): [phone]    For urgent needs outside of business hours call the [facility] at [phone] and ask for the dermatology resident on call    If this is a medical emergency and you are unable to reach an ER, Call [phone]      Who should I call with questions (pediatric)?  [facility]- Pediatric Dermatology  Dr. Mila Martínez, Dr. Obed Lackey, Dr. Ada Beckham, Zayda Smithnhmurphy, PA  Dr. Bessie Weston, Dr. Jeannette Whtie & Dr. Favian Juan  Non Urgent  Nurse Triage Line; [phone]- Chrissy and Guerda RN Care Coordinators   [place]ecy (/Complex ) [phone]    If you need a prescription refill, please contact your pharmacy. Refills are approved or denied by our Physicians during normal business hours, Monday through Fridays  Per office policy, refills will not be granted if you have not been seen within the past year (or sooner depending on your child's condition)    Scheduling Information:  Pediatric Appointment Scheduling and Call Center [phone]  Radiology Scheduling- [phone]  Sedation Unit Scheduling- [phone]  [place] Scheduling- General [phone]; Pediatric Dermatology [phone]  Main  Services: [phone]  Citizen of [place]: [phone]  [place]: [phone]  Hmong/Cm/Mario: [phone]  Preadmission Nursing Department Fax Number: [phone] (Fax all pre-operative paperwork  to this number)    For urgent matters arising during evenings, weekends, or holidays that cannot wait for normal business hours please call [phone] and ask for the Dermatology Resident On-Call to be paged.

## 2021-02-24 NOTE — PROGRESS NOTES
"Teledermatology Nurse Call Patients:     Are you  in the [facility] at the time of the encounter? yes    Today's visit will be billed to you and your insurance.    A teledermatology visit is not as thorough as an in-person visit and the quality of the photograph sent may not be of the same quality as that taken by the dermatology clinic.      Patient summary of issue :  Spot on face for about a year, has not noticing any changes. No symptoms.  Mole on back, not sure how long he has had it. Noticed it last week. Raised and flaky, has not noticed any itching.   Patient states wife had some \"spots removed\" and became concerned about these 2 spots.     Which medications have been tried, for how long, and did they make it better or worse (ex. Triamcinolone, used twice daily for 2 weeks, not improved):No  The patient has seen a dermatologist.   The patient hasno past medical history of skin cancer  ROS: The patient is generally feeling well.     Alena Montano LPN    [facility] Dermatology Note  Encounter Date: Feb 24, 2021  Store-and-Forward and Telephone ([phone]). Location of teledermatologist: [facility].  Start time: 9:15. End time: 9:21.    Dermatology Problem List:  1. History of AKs: treated with cryotherapy  2. NUB, right cheek x3 and back - in person eval needed    ____________________________________________    Assessment & Plan:     # NUB, face and back. Referral entered for facial rash so scheduled as virtual visit, but patient's concerns are actually lesions on the face and back. I let him know that while I think spots are benign (no symptoms, no atypical features on clinical photos), I cannot make diagnosis with photos, so in person exam will be needed. He noted understanding. We will get him scheduled within the next month.      Procedures Performed:    None    Follow-up: within 4 weeks for in person evaluation    Staff:     Cassi Montalvo, " "MD    Department of Dermatology  [facility]: Phone: [phone], Fax:[phone]  [facility]: Phone: [phone], Fax: [phone]    ____________________________________________    CC: Derm Problem (Spot on face and back)    HPI:  Mr. Perdomo \"Khoa\" Ben is a(n) 50 year old male who presents today as a new patient for spots of concern.    Previously saw Dr. Boyd in 2012. Had AKs at that time treated with cryotherapy.    Referred on 1/18/21 for \"facial rash\" by Dr. Yi. No prescriptions initiated. KOFI was checked. It was negative.    Patient denies facial rash, instead notes lesions of concern. He notes a white spot on the right cheek that has been present at least one year. No symptoms like pain or bleeding. He was prompted to look at his skin more closely when his wife had spots removed. Noticed brown spots when taking pictures of white spot. No symptoms at either. Noticed larger brown crusty spot on the back recently too. Could have been present much longer. No symptoms here either.    Patient is otherwise feeling well, without additional concerns.    Labs:  KOFI neg 1/25/21.    Physical Exam:  Vitals: There were no vitals taken for this visit.  SKIN: Teledermatology photos were reviewed; image quality and interpretability: acceptable. Image date: 2/23/21 see upload date.  - Yellow papule on right mid cheek.  - Brown macule on right cheek near angle of the jaw and at the zygomatic arch.  - Brown crusted papule on right mid back.  - No other lesions of concern on areas examined.     Medications:  Current Outpatient Medications   Medication     amphetamine-dextroamphetamine (ADDERALL XR) 15 MG per 24 hr capsule     baclofen (LIORESAL) 10 MG tablet     cetirizine (ZYRTEC) 10 MG tablet     venlafaxine (EFFEXOR XR) 150 MG 24 hr capsule     No current facility-administered " medications for this visit.       Past Medical/Surgical History:   Patient Active Problem List   Diagnosis     ADD (attention deficit disorder)     HARLAN (obstructive sleep apnea)     Hyperlipidemia LDL goal <160     Generalized anxiety disorder     Benign skin lesion of face     Dysthymia     Allergic rhinitis     Abnormal CXR perihilar nodes on CT chest     Palpitations     Mediastinal lymphadenopathy     Cervical myofascial strain, sequela     Past Medical History:   Diagnosis Date     ADD (attention deficit disorder) 2003     Dysthymia 2002     Elevated lipids      Generalised anxiety disorder 4/25/2012     Lung nodules      HARLAN (obstructive sleep apnea) 6/22/2011    cpap at night       CC Rocky Yi MD  [id number] [place] [address] on close of this encounter.

## 2021-02-24 NOTE — LETTER
"    2/24/2021         RE: Conor Contreras  [address] [place] [address]        Dear Colleague,    Thank you for referring your patient, Conor Contreras, to the [facility]. Please see a copy of my visit note below.    Teledermatology Nurse Call Patients:     Are you  in the [facility] at the time of the encounter? yes    Today's visit will be billed to you and your insurance.    A teledermatology visit is not as thorough as an in-person visit and the quality of the photograph sent may not be of the same quality as that taken by the dermatology clinic.      Patient summary of issue :  Spot on face for about a year, has not noticing any changes. No symptoms.  Mole on back, not sure how long he has had it. Noticed it last week. Raised and flaky, has not noticed any itching.   Patient states wife had some \"spots removed\" and became concerned about these 2 spots.     Which medications have been tried, for how long, and did they make it better or worse (ex. Triamcinolone, used twice daily for 2 weeks, not improved):No  The patient has seen a dermatologist.   The patient hasno past medical history of skin cancer  ROS: The patient is generally feeling well.     Alena Montano LPN    [facility] Dermatology Note  Encounter Date: Feb 24, 2021  Store-and-Forward and Telephone ([phone]). Location of teledermatologist: [facility].  Start time: 9:15. End time: 9:21.    Dermatology Problem List:  1. History of AKs: treated with cryotherapy  2. NUB, right cheek x3 and back - in person eval needed    ____________________________________________    Assessment & Plan:     # NUB, face and back. Referral entered for facial rash so scheduled as virtual visit, but patient's concerns are actually lesions on the face and back. I let him know that while I think spots are benign (no symptoms, no atypical features on clinical photos), I cannot " "make diagnosis with photos, so in person exam will be needed. He noted understanding. We will get him scheduled within the next month.      Procedures Performed:    None    Follow-up: within 4 weeks for in person evaluation    Staff:     Cassi Montalvo MD    Department of Dermatology  [facility]: Phone: [phone], Fax:[phone]  [facility]: Phone: [phone], Fax: [phone]    ____________________________________________    CC: Derm Problem (Spot on face and back)    HPI:  Mr. Perdomo \"Khoa\" Ben is a(n) 50 year old male who presents today as a new patient for spots of concern.    Previously saw Dr. Boyd in 2012. Had AKs at that time treated with cryotherapy.    Referred on 1/18/21 for \"facial rash\" by Dr. Yi. No prescriptions initiated. KOFI was checked. It was negative.    Patient denies facial rash, instead notes lesions of concern. He notes a white spot on the right cheek that has been present at least one year. No symptoms like pain or bleeding. He was prompted to look at his skin more closely when his wife had spots removed. Noticed brown spots when taking pictures of white spot. No symptoms at either. Noticed larger brown crusty spot on the back recently too. Could have been present much longer. No symptoms here either.    Patient is otherwise feeling well, without additional concerns.    Labs:  KOFI neg 1/25/21.    Physical Exam:  Vitals: There were no vitals taken for this visit.  SKIN: Teledermatology photos were reviewed; image quality and interpretability: acceptable. Image date: 2/23/21 see upload date.  - Yellow papule on right mid cheek.  - Brown macule on right cheek near angle of the jaw and at the zygomatic arch.  - Brown crusted papule on right mid back.  - No other lesions of concern on areas examined.     Medications:  Current Outpatient Medications "   Medication     amphetamine-dextroamphetamine (ADDERALL XR) 15 MG per 24 hr capsule     baclofen (LIORESAL) 10 MG tablet     cetirizine (ZYRTEC) 10 MG tablet     venlafaxine (EFFEXOR XR) 150 MG 24 hr capsule     No current facility-administered medications for this visit.       Past Medical/Surgical History:   Patient Active Problem List   Diagnosis     ADD (attention deficit disorder)     HARLAN (obstructive sleep apnea)     Hyperlipidemia LDL goal <160     Generalized anxiety disorder     Benign skin lesion of face     Dysthymia     Allergic rhinitis     Abnormal CXR perihilar nodes on CT chest     Palpitations     Mediastinal lymphadenopathy     Cervical myofascial strain, sequela     Past Medical History:   Diagnosis Date     ADD (attention deficit disorder) 2003     Dysthymia 2002     Elevated lipids      Generalised anxiety disorder 4/25/2012     Lung nodules      HARLAN (obstructive sleep apnea) 6/22/2011    cpap at night       CC Rocky Yi MD  [id number] [address] on close of this encounter.        Again, thank you for allowing me to participate in the care of your patient.        Sincerely,        Cassi Montalvo MD

## 2021-04-15 ENCOUNTER — OFFICE VISIT (OUTPATIENT)
Dept: ORTHOPEDICS | Facility: CLINIC | Age: 51
End: 2021-04-15
Payer: COMMERCIAL

## 2021-04-15 VITALS — BODY MASS INDEX: 26.82 KG/M2 | WEIGHT: 198 LBS | HEIGHT: 72 IN

## 2021-04-15 DIAGNOSIS — M25.512 CHRONIC LEFT SHOULDER PAIN: Primary | ICD-10-CM

## 2021-04-15 DIAGNOSIS — G89.29 CHRONIC LEFT SHOULDER PAIN: Primary | ICD-10-CM

## 2021-04-15 PROCEDURE — 99213 OFFICE O/P EST LOW 20 MIN: CPT | Mod: 25 | Performed by: FAMILY MEDICINE

## 2021-04-15 PROCEDURE — 20606 DRAIN/INJ JOINT/BURSA W/US: CPT | Mod: LT | Performed by: FAMILY MEDICINE

## 2021-04-15 RX ORDER — METHYLPREDNISOLONE ACETATE 40 MG/ML
40 INJECTION, SUSPENSION INTRA-ARTICULAR; INTRALESIONAL; INTRAMUSCULAR; SOFT TISSUE
Status: DISCONTINUED | OUTPATIENT
Start: 2021-04-15 | End: 2022-10-12

## 2021-04-15 RX ADMIN — METHYLPREDNISOLONE ACETATE 40 MG: 40 INJECTION, SUSPENSION INTRA-ARTICULAR; INTRALESIONAL; INTRAMUSCULAR; SOFT TISSUE at 10:13

## 2021-04-15 ASSESSMENT — PATIENT HEALTH QUESTIONNAIRE - PHQ9: SUM OF ALL RESPONSES TO PHQ QUESTIONS 1-9: 1

## 2021-04-15 ASSESSMENT — MIFFLIN-ST. JEOR: SCORE: 1778.18

## 2021-04-15 NOTE — LETTER
"    4/15/2021         RE: Conor Contreras  [address] [facility] [place] [address]        Dear Colleague,    Thank you for referring your patient, Conor Contreras, to the [facility] [place]. Please see a copy of my visit note below.    HISTORY OF PRESENT ILLNESS  Mr. Contreras is a pleasant 51 year old male following up with left shoulder pain.  Khoa saw me in July of last year, at that visit I injected his AC joint.  His pain subsided for many months, he started working out again at the beginning of this year and felt the return of pain in his anterior shoulder.  He went to the ER where he had a cardiac issue ruled out, although he has continued to have anterior and lateral shoulder pain.     PHYSICAL EXAM  Vitals:    04/15/21 0936   Weight: 89.8 kg (198 lb)   Height: 1.816 m (5' 11.5\")     General  - normal appearance, in no obvious distress  HEENT  - conjunctivae not injected, moist mucous membranes  CV  - normal radial pulse  Pulm  - normal respiratory pattern, non-labored  Musculoskeletal - left shoulder  - inspection: normal bone and joint alignment, no obvious deformity, no scapular winging, no AC step-off  - palpation: mildly tender AC  - ROM: painful and limited flexion and cross arm at end range  - strength: 5/5  strength, 5/5 in all shoulder planes  - special tests:  (-) Speed's  (+) Neer  (+) Hawkin's  (-) subscap lift-off  Neuro  - no sensory or motor deficit, grossly normal coordination, normal muscle tone  Skin  - no ecchymosis, erythema, warmth, or induration, no obvious rash  Psych  - interactive, appropriate, normal mood and affect        ASSESSMENT & PLAN  Mr. Contreras is a 51 year old male following up with left shoulder pain.    I reviewed his previous MRI in the room with him, this does reveal distal clavicle edema and AC joint osteoarthritis.  He also has a low-grade partial-thickness tear of the supraspinatus, as well is degenerative tearing of the " "superior labrum.    Given that his last injection helped for many months I do think a repeat injection would be reasonable.  We did perform this today (see procedure note).  If this injection helps him he can be certain that his issue is still his AC joint, if this injection is not helpful I would consider possibly injecting his subacromial bursa in the coming weeks.    It was a pleasure seeing Khoa.        Favian Pozo DO, CAQSM      This note was constructed using Dragon dictation software, please excuse any minor errors in spelling, grammar, or syntax.    Medium Joint Injection/Arthrocentesis: L acromioclavicular    Date/Time: 4/15/2021 10:13 AM  Performed by: Favian Pozo DO  Authorized by: Favian Pozo DO     Indications:  Pain  Needle Size:  22 G  Guidance: ultrasound    Location:  Shoulder  Site:  L acromioclavicular  Medications:  40 mg methylPREDNISolone 40 MG/ML  Outcome:  Tolerated well, no immediate complications  Procedure discussed: discussed risks, benefits, and alternatives    Consent Given by:  Patient  Timeout: timeout called immediately prior to procedure    Prep: patient was prepped and draped in usual sterile fashion       PROCEDURE    Acromioclavicular Injection - Ultrasound Guided  The patient was informed of the risks and the benefits of the procedure and a written consent was signed.  The patient s left acromioclavicular joint was prepped with chlorhexidine in sterile fashion.   40 mg of triamcinolone suspension was drawn up into a 3 mL syringe with 1 mL of 1% lidocaine.  Injection was performed using sterile technique.  Under ultrasound guidance a 1.5\" 22-gauge needle was used to enter the left acromioclavicular joint.  Needle placement was visualized and documented with ultrasound.  Needle placed in short axis to the probe.  Ultrasound visualization was necessary due to decreased joint space in the setting of osteoarthritis.  Images were permanently stored for the patient's " record.  There were no complications. The patient tolerated the procedure well. There was negligible bleeding.   The patient was instructed to ice the shoulder upon leaving clinic and refrain from overuse over the next 3 days.   The patient was instructed to call or go to the emergency room with any unusual pain, swelling, redness, or if otherwise concerned.                  Again, thank you for allowing me to participate in the care of your patient.        Sincerely,        Favian Pozo, DO

## 2021-04-15 NOTE — PROGRESS NOTES
"HISTORY OF PRESENT ILLNESS  Mr. Contreras is a pleasant 51 year old male following up with left shoulder pain.  Khoa saw me in July of last year, at that visit I injected his AC joint.  His pain subsided for many months, he started working out again at the beginning of this year and felt the return of pain in his anterior shoulder.  He went to the ER where he had a cardiac issue ruled out, although he has continued to have anterior and lateral shoulder pain.     PHYSICAL EXAM  Vitals:    04/15/21 0936   Weight: 89.8 kg (198 lb)   Height: 1.816 m (5' 11.5\")     General  - normal appearance, in no obvious distress  HEENT  - conjunctivae not injected, moist mucous membranes  CV  - normal radial pulse  Pulm  - normal respiratory pattern, non-labored  Musculoskeletal - left shoulder  - inspection: normal bone and joint alignment, no obvious deformity, no scapular winging, no AC step-off  - palpation: mildly tender AC  - ROM: painful and limited flexion and cross arm at end range  - strength: 5/5  strength, 5/5 in all shoulder planes  - special tests:  (-) Speed's  (+) Neer  (+) Hawkin's  (-) subscap lift-off  Neuro  - no sensory or motor deficit, grossly normal coordination, normal muscle tone  Skin  - no ecchymosis, erythema, warmth, or induration, no obvious rash  Psych  - interactive, appropriate, normal mood and affect        ASSESSMENT & PLAN  Mr. Contreras is a 51 year old male following up with left shoulder pain.    I reviewed his previous MRI in the room with him, this does reveal distal clavicle edema and AC joint osteoarthritis.  He also has a low-grade partial-thickness tear of the supraspinatus, as well is degenerative tearing of the superior labrum.    Given that his last injection helped for many months I do think a repeat injection would be reasonable.  We did perform this today (see procedure note).  If this injection helps him he can be certain that his issue is still his AC joint, if this injection is " "not helpful I would consider possibly injecting his subacromial bursa in the coming weeks.    It was a pleasure seeing Khoa.        Favian Pozo DO, CAQSM      This note was constructed using Dragon dictation software, please excuse any minor errors in spelling, grammar, or syntax.    Medium Joint Injection/Arthrocentesis: L acromioclavicular    Date/Time: 4/15/2021 10:13 AM  Performed by: Favian Pozo DO  Authorized by: Favian Pozo DO     Indications:  Pain  Needle Size:  22 G  Guidance: ultrasound    Location:  Shoulder  Site:  L acromioclavicular  Medications:  40 mg methylPREDNISolone 40 MG/ML  Outcome:  Tolerated well, no immediate complications  Procedure discussed: discussed risks, benefits, and alternatives    Consent Given by:  Patient  Timeout: timeout called immediately prior to procedure    Prep: patient was prepped and draped in usual sterile fashion       PROCEDURE    Acromioclavicular Injection - Ultrasound Guided  The patient was informed of the risks and the benefits of the procedure and a written consent was signed.  The patient's left acromioclavicular joint was prepped with chlorhexidine in sterile fashion.   40 mg of depomedrol suspension was drawn up into a 3 mL syringe with 1 mL of 1% lidocaine.  Injection was performed using sterile technique.  Under ultrasound guidance a 1.5\" 22-gauge needle was used to enter the left acromioclavicular joint.  Needle placement was visualized and documented with ultrasound.  Needle placed in short axis to the probe.  Ultrasound visualization was necessary due to decreased joint space in the setting of osteoarthritis.  Images were permanently stored for the patient's record.  There were no complications. The patient tolerated the procedure well. There was negligible bleeding.   The patient was instructed to ice the shoulder upon leaving clinic and refrain from overuse over the next 3 days.   The patient was instructed to call or go to the " emergency room with any unusual pain, swelling, redness, or if otherwise concerned.

## 2021-06-14 ENCOUNTER — E-VISIT (OUTPATIENT)
Dept: FAMILY MEDICINE | Facility: CLINIC | Age: 51
End: 2021-06-14
Payer: COMMERCIAL

## 2021-06-14 DIAGNOSIS — G47.33 OSA (OBSTRUCTIVE SLEEP APNEA): Primary | ICD-10-CM

## 2021-06-14 PROCEDURE — 99207 PR NON-BILLABLE SERV PER CHARTING: CPT | Performed by: FAMILY MEDICINE

## 2021-06-28 ENCOUNTER — OFFICE VISIT (OUTPATIENT)
Dept: OTOLARYNGOLOGY | Facility: CLINIC | Age: 51
End: 2021-06-28
Payer: COMMERCIAL

## 2021-06-28 VITALS — OXYGEN SATURATION: 99 % | HEART RATE: 76 BPM | SYSTOLIC BLOOD PRESSURE: 114 MMHG | DIASTOLIC BLOOD PRESSURE: 76 MMHG

## 2021-06-28 DIAGNOSIS — G47.33 OSA (OBSTRUCTIVE SLEEP APNEA): Primary | ICD-10-CM

## 2021-06-28 PROCEDURE — 99202 OFFICE O/P NEW SF 15 MIN: CPT | Performed by: OTOLARYNGOLOGY

## 2021-06-28 NOTE — PROGRESS NOTES
SLEEP SURGERY CONSULTATION    Patient: Conor Contreras  : 1970  CHIEF COMPLAINT: HARLAN    IDENTIFICATION: Patient consulted Dr. Monae for Insp[facility]    HPI:  Conor Contreras is a 51 year old year old male who has Obstructive Sleep Apnea.  Patient was diagnosed with sleep apnea in .  At that time the overall AHI was 31 with a lowest oxygen saturation of 83%.  Patient has been using CPAP since that time.  He does use it on a nightly basis but does report that he finds it inconvenient when he wants to take a nap or to travel with it.  He heard about inspire and would like to get more information to see if he might be a candidate.      PAST MEDICAL HISTORY:  Past Medical History:   Diagnosis Date     ADD (attention deficit disorder)      Dysthymia      Elevated lipids      Generalised anxiety disorder 2012     Lung nodules      HARLAN (obstructive sleep apnea) 2011    cpap at night       PAST SURGICAL HISTORY:  Past Surgical History:   Procedure Laterality Date     ENDOBRONCHIAL ULTRASOUND FLEXIBLE N/A 2015    Procedure: ENDOBRONCHIAL ULTRASOUND FLEXIBLE;  Surgeon: Ramakrishna Williamson MD;  Location:  GI     VASECTOMY         MEDICATIONS:  Current Outpatient Medications   Medication Sig Dispense Refill     amphetamine-dextroamphetamine (ADDERALL XR) 15 MG per 24 hr capsule TAKE ONE CAPSULE BY MOUTH EVERY MORNING PLEASE MAKE AN APPOINTMENT FOR FUTURE REFILLS  0     baclofen (LIORESAL) 10 MG tablet Take 1 tablet (10 mg) by mouth 3 times daily as needed for muscle spasms 30 tablet 11     cetirizine (ZYRTEC) 10 MG tablet Take 1 tablet (10 mg) by mouth At Bedtime 30 tablet 11     venlafaxine (EFFEXOR XR) 150 MG 24 hr capsule Take 150 mg by mouth daily         ALLERGIES:  No Known Allergies    SOCIAL HISTORY:  Social History     Socioeconomic History     Marital status:      Spouse name: Lesly     Number of children: 0     Years of education: Not on file     Highest education  level: Not on file   Occupational History     Occupation: Alcohol and drug counselor     Employer: [employer]     Comment: [employer]   Social Needs     Financial resource strain: Not on file     Food insecurity     Worry: Not on file     Inability: Not on file     Transportation needs     Medical: Not on file     Non-medical: Not on file   Tobacco Use     Smoking status: Former Smoker     Packs/day: 0.50     Years: 10.00     Pack years: 5.00     Types: Cigarettes     Quit date: 3/18/2001     Years since quittin.2     Smokeless tobacco: Never Used     Tobacco comment: QUIT 9 YEARS AGO   Substance and Sexual Activity     Alcohol use: Yes     Alcohol/week: 0.0 standard drinks     Comment: very rarely     Drug use: No     Comment: h/o drugs, denies past 13 yrs     Sexual activity: Yes     Partners: Male     Comment:  one partner   Lifestyle     Physical activity     Days per week: Not on file     Minutes per session: Not on file     Stress: Not on file   Relationships     Social connections     Talks on phone: Not on file     Gets together: Not on file     Attends Jewish service: Not on file     Active member of club or organization: Not on file     Attends meetings of clubs or organizations: Not on file     Relationship status: Not on file     Intimate partner violence     Fear of current or ex partner: Not on file     Emotionally abused: Not on file     Physically abused: Not on file     Forced sexual activity: Not on file   Other Topics Concern     Parent/sibling w/ CABG, MI or angioplasty before 65F 55M? No      Service No     Blood Transfusions No     Caffeine Concern No     Occupational Exposure No     Hobby Hazards No     Sleep Concern No     Stress Concern No     Weight Concern No     Special Diet No     Back Care No     Exercise Yes     Comment: Sometimes     Bike Helmet No     Seat Belt Yes     Self-Exams No   Social History Narrative    The patient has a 10 or less pk yr  tobacco hx.  He has no active use.  Alcohol use is 12 alcoholic drinks per week.   He has hx of recreational drug use with methamphetamines - inhaled, marijuana. Some rare hx of IVDU.         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        FAMILY HISTORY:  Family History   Problem Relation Age of Onset     C.A.D. Maternal Grandfather      Alcohol/Drug Maternal Grandfather          from alcoholism     Cerebrovascular Disease Maternal Grandfather      Lipids Father      Cancer Maternal Grandmother         Pancreatic     Cancer Paternal Grandmother         Lung, may not have been primary     Heart Disease Paternal Grandfather      Anxiety Disorder Sister      Anxiety Disorder Sister      LUNG DISEASE Maternal Uncle         Asthma versus sarcoid     Asthma No family hx of      Diabetes No family hx of      Hypertension No family hx of      Breast Cancer No family hx of      Cancer - colorectal No family hx of      Prostate Cancer No family hx of      Connective Tissue Disorder No family hx of      Thyroid Disease No family hx of        REVIEW OF SYSTEMS:  No flowsheet data found.      PHYSICAL EXAM  /76   Pulse 76   SpO2 99%     Constitutional: healthy, alert and no distress    ASSESSMENT:  1.  Severe obstructive sleep apnea,      PLAN:  I discussed with the patient held upper airway stimulation therapy works. We reviewed expected outcomes as well as MRI incompatibility restrictions at this time.  We discussed what is involved in the surgery as well as as expected recovery.  Discussed with the patient the risk of nonresponse rate as well as potential discomfort from the device itself while stimulating the tongue. We then reviewed the selection criteria.    Patient is not a candidate for hypoglossal nerve stimulation therapy.  He currently is using CPAP and has used it for the  last 12 years.  I discussed with him that this type of therapy is recommended for patients who cannot use CPAP.      I spent a total of 15 minutes total time towards today's visit.  Time spent included seeing and evaluating Conor Cruzant during today's office visit, which included counseling the patient.  Time was also spent reviewing records/tests; and documenting clinical information in the electronic health record.

## 2021-06-28 NOTE — LETTER
2021         RE: Conor Contreras  [address] [facility] [address]        Dear Colleague,    Thank you for referring your patient, Conor Contreras, to the [facility]. Please see a copy of my visit note below.        SLEEP SURGERY CONSULTATION    Patient: Conor Contreras  : 1970  CHIEF COMPLAINT: HARLAN    IDENTIFICATION: Patient consulted Dr. Monae for Inspire    HPI:  Conor Contreras is a 51 year old year old male who has Obstructive Sleep Apnea.  Patient was diagnosed with sleep apnea in .  At that time the overall AHI was 31 with a lowest oxygen saturation of 83%.  Patient has been using CPAP since that time.  He does use it on a nightly basis but does report that he finds it inconvenient when he wants to take a nap or to travel with it.  He heard about inspire and would like to get more information to see if he might be a candidate.      PAST MEDICAL HISTORY:  Past Medical History:   Diagnosis Date     ADD (attention deficit disorder)      Dysthymia      Elevated lipids      Generalised anxiety disorder 2012     Lung nodules      HARLAN (obstructive sleep apnea) 2011    cpap at night       PAST SURGICAL HISTORY:  Past Surgical History:   Procedure Laterality Date     ENDOBRONCHIAL ULTRASOUND FLEXIBLE N/A 2015    Procedure: ENDOBRONCHIAL ULTRASOUND FLEXIBLE;  Surgeon: Ramakrishna Williamson MD;  Location:  GI     VASECTOMY         MEDICATIONS:  Current Outpatient Medications   Medication Sig Dispense Refill     amphetamine-dextroamphetamine (ADDERALL XR) 15 MG per 24 hr capsule TAKE ONE CAPSULE BY MOUTH EVERY MORNING PLEASE MAKE AN APPOINTMENT FOR FUTURE REFILLS  0     baclofen (LIORESAL) 10 MG tablet Take 1 tablet (10 mg) by mouth 3 times daily as needed for muscle spasms 30 tablet 11     cetirizine (ZYRTEC) 10 MG tablet Take 1 tablet (10 mg) by mouth At Bedtime 30 tablet 11     venlafaxine (EFFEXOR XR) 150 MG 24 hr capsule Take 150  mg by mouth daily         ALLERGIES:  No Known Allergies    SOCIAL HISTORY:  Social History     Socioeconomic History     Marital status:      Spouse name: Lesly     Number of children: 0     Years of education: Not on file     Highest education level: Not on file   Occupational History     Occupation: Alcohol and drug counselor     Employer: [employer] [place]     Comment: [employer] [place]   Social Needs     Financial resource strain: Not on file     Food insecurity     Worry: Not on file     Inability: Not on file     Transportation needs     Medical: Not on file     Non-medical: Not on file   Tobacco Use     Smoking status: Former Smoker     Packs/day: 0.50     Years: 10.00     Pack years: 5.00     Types: Cigarettes     Quit date: 3/18/2001     Years since quittin.2     Smokeless tobacco: Never Used     Tobacco comment: QUIT 9 YEARS AGO   Substance and Sexual Activity     Alcohol use: Yes     Alcohol/week: 0.0 standard drinks     Comment: very rarely     Drug use: No     Comment: h/o drugs, denies past 13 yrs     Sexual activity: Yes     Partners: Male     Comment:  one partner   Lifestyle     Physical activity     Days per week: Not on file     Minutes per session: Not on file     Stress: Not on file   Relationships     Social connections     Talks on phone: Not on file     Gets together: Not on file     Attends Orthodoxy service: Not on file     Active member of club or organization: Not on file     Attends meetings of clubs or organizations: Not on file     Relationship status: Not on file     Intimate partner violence     Fear of current or ex partner: Not on file     Emotionally abused: Not on file     Physically abused: Not on file     Forced sexual activity: Not on file   Other Topics Concern     Parent/sibling w/ CABG, MI or angioplasty before 65F 55M? No      Service No     Blood Transfusions No     Caffeine Concern No     Occupational Exposure No     Hobby Hazards No     Sleep  Concern No     Stress Concern No     Weight Concern No     Special Diet No     Back Care No     Exercise Yes     Comment: Sometimes     Bike Helmet No     Seat Belt Yes     Self-Exams No   Social History Narrative    The patient has a 10 or less pk yr tobacco hx.  He has no active use.  Alcohol use is 12 alcoholic drinks per week.   He has hx of recreational drug use with methamphetamines - inhaled, marijuana. Some rare hx of IVDU.         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        FAMILY HISTORY:  Family History   Problem Relation Age of Onset     C.A.D. Maternal Grandfather      Alcohol/Drug Maternal Grandfather          from alcoholism     Cerebrovascular Disease Maternal Grandfather      Lipids Father      Cancer Maternal Grandmother         Pancreatic     Cancer Paternal Grandmother         Lung, may not have been primary     Heart Disease Paternal Grandfather      Anxiety Disorder Sister      Anxiety Disorder Sister      LUNG DISEASE Maternal Uncle         Asthma versus sarcoid     Asthma No family hx of      Diabetes No family hx of      Hypertension No family hx of      Breast Cancer No family hx of      Cancer - colorectal No family hx of      Prostate Cancer No family hx of      Connective Tissue Disorder No family hx of      Thyroid Disease No family hx of        REVIEW OF SYSTEMS:  No flowsheet data found.      PHYSICAL EXAM  /76   Pulse 76   SpO2 99%     Constitutional: healthy, alert and no distress    ASSESSMENT:  1.  Severe obstructive sleep apnea,      PLAN:  I discussed with the patient held upper airway stimulation therapy works. We reviewed expected outcomes as well as MRI incompatibility restrictions at this time.  We discussed what is involved in the surgery as well as as expected recovery.  Discussed with the patient the risk of nonresponse rate  as well as potential discomfort from the device itself while stimulating the tongue. We then reviewed the selection criteria.    Patient is not a candidate for hypoglossal nerve stimulation therapy.  He currently is using CPAP and has used it for the last 12 years.  I discussed with him that this type of therapy is recommended for patients who cannot use CPAP.      I spent a total of 15 minutes total time towards today's visit.  Time spent included seeing and evaluating Conor Contreras during today's office visit, which included counseling the patient.  Time was also spent reviewing records/tests; and documenting clinical information in the electronic health record.              Again, thank you for allowing me to participate in the care of your patient.        Sincerely,        Nay Monae MD

## 2021-08-30 ENCOUNTER — APPOINTMENT (OUTPATIENT)
Dept: URGENT CARE | Facility: CLINIC | Age: 51
End: 2021-08-30
Payer: COMMERCIAL

## 2021-09-02 ENCOUNTER — TRANSFERRED RECORDS (OUTPATIENT)
Dept: HEALTH INFORMATION MANAGEMENT | Facility: CLINIC | Age: 51
End: 2021-09-02

## 2021-09-26 ENCOUNTER — HEALTH MAINTENANCE LETTER (OUTPATIENT)
Age: 51
End: 2021-09-26

## 2021-10-04 ENCOUNTER — E-VISIT (OUTPATIENT)
Dept: FAMILY MEDICINE | Facility: CLINIC | Age: 51
End: 2021-10-04
Payer: COMMERCIAL

## 2021-10-04 DIAGNOSIS — K21.9 GASTROESOPHAGEAL REFLUX DISEASE WITHOUT ESOPHAGITIS: Primary | ICD-10-CM

## 2021-10-04 PROCEDURE — 99421 OL DIG E/M SVC 5-10 MIN: CPT | Performed by: INTERNAL MEDICINE

## 2021-10-04 RX ORDER — PANTOPRAZOLE SODIUM 40 MG/1
40 TABLET, DELAYED RELEASE ORAL 2 TIMES DAILY
Qty: 60 TABLET | Refills: 5 | Status: SHIPPED | OUTPATIENT
Start: 2021-10-04 | End: 2022-10-12

## 2021-10-07 ENCOUNTER — PRE VISIT (OUTPATIENT)
Dept: ORTHOPEDICS | Facility: CLINIC | Age: 51
End: 2021-10-07

## 2021-10-07 DIAGNOSIS — G89.29 CHRONIC LEFT SHOULDER PAIN: Primary | ICD-10-CM

## 2021-10-07 DIAGNOSIS — M25.512 CHRONIC LEFT SHOULDER PAIN: Primary | ICD-10-CM

## 2021-10-07 NOTE — TELEPHONE ENCOUNTER
Chronic left shoulder pain, last steroid inj to AC joint on 4/15/21, MR/XR 7/15/20, ref'd by Dr. Pozo.    Needs updated XR. Called and left Vm to come 30 min early and callback to confirm.    Catracho Mcghee RN

## 2021-10-11 ENCOUNTER — OFFICE VISIT (OUTPATIENT)
Dept: ORTHOPEDICS | Facility: CLINIC | Age: 51
End: 2021-10-11
Payer: COMMERCIAL

## 2021-10-11 ENCOUNTER — ANCILLARY PROCEDURE (OUTPATIENT)
Dept: GENERAL RADIOLOGY | Facility: CLINIC | Age: 51
End: 2021-10-11
Attending: ORTHOPAEDIC SURGERY
Payer: COMMERCIAL

## 2021-10-11 ENCOUNTER — TELEPHONE (OUTPATIENT)
Dept: ORTHOPEDICS | Facility: CLINIC | Age: 51
End: 2021-10-11

## 2021-10-11 VITALS — HEIGHT: 69 IN | BODY MASS INDEX: 29.52 KG/M2 | WEIGHT: 199.3 LBS

## 2021-10-11 DIAGNOSIS — S49.92XA INJURY OF LEFT ACROMIOCLAVICULAR JOINT, INITIAL ENCOUNTER: Primary | ICD-10-CM

## 2021-10-11 DIAGNOSIS — G89.29 CHRONIC LEFT SHOULDER PAIN: ICD-10-CM

## 2021-10-11 DIAGNOSIS — M25.512 CHRONIC LEFT SHOULDER PAIN: ICD-10-CM

## 2021-10-11 PROCEDURE — 99203 OFFICE O/P NEW LOW 30 MIN: CPT | Performed by: ORTHOPAEDIC SURGERY

## 2021-10-11 PROCEDURE — 73030 X-RAY EXAM OF SHOULDER: CPT | Mod: LT | Performed by: RADIOLOGY

## 2021-10-11 ASSESSMENT — MIFFLIN-ST. JEOR: SCORE: 1749.01

## 2021-10-11 NOTE — NURSING NOTE
Reason For Visit:   Chief Complaint   Patient presents with     Consult      Chronic left shoulder pain, last steroid inj to AC joint on 4/15/21, MR/XR 7/15/20, ref'd by Dr. Pozo       PCP: Rocky Yi  Ref: Dr. Pozo    ?  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  Currently working? Yes.  Work status?  Full time.  Date of injury: Was lifting overhead with  in early of 2020.   Date of surgery: No previous surgeries, last steroid injection 4/15/21  Smoker: No  Request smoking cessation information: No    Right hand dominant    SANE score  Affected shoulder: Left  Right shoulder SANE: 100  Left shoulder SANE: 60    There were no vitals taken for this visit.    uLh Ornelas, ATC

## 2021-10-11 NOTE — TELEPHONE ENCOUNTER
10/11 Called and left voicemail. Provided phone number [phone] to schedule follow up with Dr. Pozo for  another left shoulder AC joint injection.    Susana loving Procedure   Orthopedics, Podiatry, Sports Medicine, ENT/Eye Specialties  [facility]   [phone]

## 2021-10-11 NOTE — LETTER
10/11/2021         RE: Conor Contreras  [address] [place] [address]        Dear Colleague,    Thank you for referring your patient, Conor Contreras, to the [facility]. Please see a copy of my visit note below.    Chief Concern: Consult - Left Shoulder Pain    History of Present Illness:   Conor Contreras is a 51 year old male who presents today for evaluation of left shoulder pain. The patient reports having pain in his left shoulder since February 2020 with no known injury or trauma at that time. At his initial visit with Dr. Pozo on 7/20/21, the patient localized his pain to the lateral and superior aspect of his left shoulder, describing the pain as achy and worse in certain positions. He states the pain is worse with motions such as overhead lifting and reaching behind his back,with intermittent pain radiating down the posterior side of his arm. He notes trying physical therapy with no relief, and some worsening of the pain. Dr. Pozo administered an ultrasound guided AC joint space cortisone injection on 07/20/20 with relief allowing him to return to his hobby of weight lifting. The patient notes the relief wore off after a few months, with pain returning to his anterior shoulder. The patient was last seen for this issue by Dr. Pozo on 04/15/21 with continued anterior and lateral left shoulder pain. He was administered a second injection at this time with full resolution of his pain after the injection. At the time of visit, he notes the localized pain as described above is retuning, and while he does not have pain radiating the back of his arm today, he feels as if his symptoms are headed that direction. He denies any numbness or tinging in his hands. Additionally, he notes new onset of periscapular strain throughout his work day as he is on the computer typing a fair amount.    Review of Systems:   A 10-point review of systems was obtained and is negative  except for as noted in the HPI.     Medications:      amphetamine-dextroamphetamine (ADDERALL XR) 15 MG per 24 hr capsule, TAKE ONE CAPSULE BY MOUTH EVERY MORNING PLEASE MAKE AN APPOINTMENT FOR FUTURE REFILLS, Disp: , Rfl: 0     baclofen (LIORESAL) 10 MG tablet, Take 1 tablet (10 mg) by mouth 3 times daily as needed for muscle spasms, Disp: 30 tablet, Rfl: 11     cetirizine (ZYRTEC) 10 MG tablet, Take 1 tablet (10 mg) by mouth At Bedtime, Disp: 30 tablet, Rfl: 11     pantoprazole (PROTONIX) 40 MG EC tablet, Take 1 tablet (40 mg) by mouth 2 times daily, Disp: 60 tablet, Rfl: 5     venlafaxine (EFFEXOR XR) 150 MG 24 hr capsule, Take 150 mg by mouth daily, Disp: , Rfl:     Current Facility-Administered Medications:      methylPREDNISolone (DEPO-MEDROL) injection 40 mg, 40 mg, , , Favian Pozo, DO, 40 mg at 04/15/21 1013    Allergies:  Patient has no known allergies.     Past Medical History:  ADD  Dysthymia  Elevated lipids  NICOLA  Lung nodules  HARLAN    Past Surgical History:  Endobronchial ultrasound flexible  Vasectomy     Social History:  Works as a . He denies tobacco use and admits to occasional alcohol use.     Family History:  Sister - anxiety x2  Father - lipids    Physical Examination:  Adult male in no acute distress. Articulates and communicates with normal affect.  Respirations even and unlabored  Focused upper extremity exam: Forward elevation to 175. External rotation to 75. Internal rotation to T 12. No tenderness to palpation at the AC joint. Minimal anterior shoulder discomfort on palpation. Forward elevation and external rotation 5/5 strength without pain. Equivocal Rupert's test (mild discomfort with both pronation and supination). Negative Speed's test.    Imaging:   MR Imaging of the Left Shoulder without contrast (7/15/20)  1. Mild degenerative changes of the left shoulder acromioclavicular joint with extensive bone marrow edema in the distal clavicle. Findings favor stress reaction  or possible nondisplaced fracture of the distal clavicle, likely secondary to repetitive trauma/overuse.   2. Supraspinatus tendinosis with low grade partial thickness bursal-sided tearing of the middle and posterior fibers at the footprint. Tendinosis of the infraspinatus and subscapularis tendons.  No full-thickness tear or tendon retraction. Normal rotator cuff muscle bulk.  3. Degenerative tearing of the superior labrum.   Report per radiology.    Radiographs of the Left Shoulder - 3 views (10/11/2021)  There are subtle changes to the distal clavicle which is consistent with distal clavicle osteolysis. There are no significant AC or glenohumeral degenerative changes noted.    I have independently reviewed the above imaging studies; the results were discussed with the patient.     Assessment:   1. Left shoulder pain    Plan:   We reviewed the exam and imaging findings today. We discussed that based on exam and imaging would not indicate patient at present for surgical intervention.   -Discussed risks and benefits of repeat ultrasound guided acromioclavicular joint cortisone injections.  -Discussed risks and benefits of topical medications such as Voltaren or diclofenac with plan to use these interventions.  -Discussed benefits of physical therapy to optimize shoulder mechanics with plan to schedule further appointments.  -Follow up with me as needed pending response to injection and PT      Scribe Disclosure:  I, Natasha Espana, am serving as a scribe to document services personally performed by Krysten Augustin MD at this visit, based upon the provider's statements to me. All documentation has been reviewed by the aforementioned provider prior to being entered into the official medical record.     I, Natasha Espana, a scribe, prepared the chart for today's encounter.    Provider Disclosure:  I agree with above History, Physical exam and Plan.  I have reviewed the content of the documentation and have edited it as  needed. I have personally performed the services documented here and the documentation accurately represents those services and the decisions I have made.      Krysten Augustin MD        Again, thank you for allowing me to participate in the care of your patient.        Sincerely,        Krysten Augustin MD

## 2021-10-11 NOTE — PROGRESS NOTES
Chief Concern: Consult - Left Shoulder Pain    History of Present Illness:   Conor Contreras is a 51 year old male who presents today for evaluation of left shoulder pain. The patient reports having pain in his left shoulder since February 2020 with no known injury or trauma at that time. At his initial visit with Dr. Pozo on 7/20/21, the patient localized his pain to the lateral and superior aspect of his left shoulder, describing the pain as achy and worse in certain positions. He states the pain is worse with motions such as overhead lifting and reaching behind his back,with intermittent pain radiating down the posterior side of his arm. He notes trying physical therapy with no relief, and some worsening of the pain. Dr. Pozo administered an ultrasound guided AC joint space cortisone injection on 07/20/20 with relief allowing him to return to his hobby of weight lifting. The patient notes the relief wore off after a few months, with pain returning to his anterior shoulder. The patient was last seen for this issue by Dr. Pozo on 04/15/21 with continued anterior and lateral left shoulder pain. He was administered a second injection at this time with full resolution of his pain after the injection. At the time of visit, he notes the localized pain as described above is retuning, and while he does not have pain radiating the back of his arm today, he feels as if his symptoms are headed that direction. He denies any numbness or tinging in his hands. Additionally, he notes new onset of periscapular strain throughout his work day as he is on the computer typing a fair amount.    Review of Systems:   A 10-point review of systems was obtained and is negative except for as noted in the HPI.     Medications:      amphetamine-dextroamphetamine (ADDERALL XR) 15 MG per 24 hr capsule, TAKE ONE CAPSULE BY MOUTH EVERY MORNING PLEASE MAKE AN APPOINTMENT FOR FUTURE REFILLS, Disp: , Rfl: 0     baclofen (LIORESAL) 10 MG tablet,  Take 1 tablet (10 mg) by mouth 3 times daily as needed for muscle spasms, Disp: 30 tablet, Rfl: 11     cetirizine (ZYRTEC) 10 MG tablet, Take 1 tablet (10 mg) by mouth At Bedtime, Disp: 30 tablet, Rfl: 11     pantoprazole (PROTONIX) 40 MG EC tablet, Take 1 tablet (40 mg) by mouth 2 times daily, Disp: 60 tablet, Rfl: 5     venlafaxine (EFFEXOR XR) 150 MG 24 hr capsule, Take 150 mg by mouth daily, Disp: , Rfl:     Current Facility-Administered Medications:      methylPREDNISolone (DEPO-MEDROL) injection 40 mg, 40 mg, , , Favian Pozo, DO, 40 mg at 04/15/21 1013    Allergies:  Patient has no known allergies.     Past Medical History:  ADD  Dysthymia  Elevated lipids  NICOLA  Lung nodules  HARLAN    Past Surgical History:  Endobronchial ultrasound flexible  Vasectomy     Social History:  Works as a . He denies tobacco use and admits to occasional alcohol use.     Family History:  Sister - anxiety x2  Father - lipids    Physical Examination:  Adult male in no acute distress. Articulates and communicates with normal affect.  Respirations even and unlabored  Focused upper extremity exam: Forward elevation to 175. External rotation to 75. Internal rotation to T 12. No tenderness to palpation at the AC joint. Minimal anterior shoulder discomfort on palpation. Forward elevation and external rotation 5/5 strength without pain. Equivocal Denton's test (mild discomfort with both pronation and supination). Negative Speed's test.    Imaging:   MR Imaging of the Left Shoulder without contrast (7/15/20)  1. Mild degenerative changes of the left shoulder acromioclavicular joint with extensive bone marrow edema in the distal clavicle. Findings favor stress reaction or possible nondisplaced fracture of the distal clavicle, likely secondary to repetitive trauma/overuse.   2. Supraspinatus tendinosis with low grade partial thickness bursal-sided tearing of the middle and posterior fibers at the footprint. Tendinosis of the  infraspinatus and subscapularis tendons.  No full-thickness tear or tendon retraction. Normal rotator cuff muscle bulk.  3. Degenerative tearing of the superior labrum.   Report per radiology.    Radiographs of the Left Shoulder - 3 views (10/11/2021)  There are subtle changes to the distal clavicle which is consistent with distal clavicle osteolysis. There are no significant AC or glenohumeral degenerative changes noted.    I have independently reviewed the above imaging studies; the results were discussed with the patient.     Assessment:   1. Left shoulder pain    Plan:   We reviewed the exam and imaging findings today. We discussed that based on exam and imaging would not indicate patient at present for surgical intervention.   -Discussed risks and benefits of repeat ultrasound guided acromioclavicular joint cortisone injections.  -Discussed risks and benefits of topical medications such as Voltaren or diclofenac with plan to use these interventions.  -Discussed benefits of physical therapy to optimize shoulder mechanics with plan to schedule further appointments.  -Follow up with me as needed pending response to injection and PT      Scribe Disclosure:  I, Natasha Espana, am serving as a scribe to document services personally performed by Krysten Augustin MD at this visit, based upon the provider's statements to me. All documentation has been reviewed by the aforementioned provider prior to being entered into the official medical record.     I, Natasha Espana, a scribe, prepared the chart for today's encounter.    Provider Disclosure:  I agree with above History, Physical exam and Plan.  I have reviewed the content of the documentation and have edited it as needed. I have personally performed the services documented here and the documentation accurately represents those services and the decisions I have made.      Krysten Augustin MD

## 2021-10-11 NOTE — PATIENT INSTRUCTIONS
Thanks for coming today.  Ortho[facility] Medicine Clinic  [address] [place] [address]    To schedule future appointments in Ortho Clinic, you may call [phone].    To schedule ordered imaging by your provider:   Call [facility] [phone]    To schedule an injection ordered by your provider:  Call [facility] Injection scheduling line: [phone]  [facility] available online at:  [email or web address]    Please call if any further questions or concerns ([phone]).  Clinic hours 8 am to 5 pm.    Return to clinic (call) if symptoms worsen or fail to improve.

## 2021-10-18 NOTE — TELEPHONE ENCOUNTER
10/18 2nd attempt.  Called and left voicemail. Provided phone number [phone] to schedule follow up with Dr. Pozo for  another left shoulder AC joint injection.    Susana loving Procedure   Orthopedics, Podiatry, Sports Medicine, ENT/Eye Specialties  [facility]   [phone]

## 2021-10-21 ENCOUNTER — OFFICE VISIT (OUTPATIENT)
Dept: ORTHOPEDICS | Facility: CLINIC | Age: 51
End: 2021-10-21
Payer: COMMERCIAL

## 2021-10-21 VITALS — BODY MASS INDEX: 29.14 KG/M2 | WEIGHT: 199 LBS

## 2021-10-21 DIAGNOSIS — M25.512 CHRONIC LEFT SHOULDER PAIN: Primary | ICD-10-CM

## 2021-10-21 DIAGNOSIS — G89.29 CHRONIC LEFT SHOULDER PAIN: Primary | ICD-10-CM

## 2021-10-21 PROCEDURE — 99207 PR DROP WITH A PROCEDURE: CPT | Performed by: FAMILY MEDICINE

## 2021-10-21 PROCEDURE — 20606 DRAIN/INJ JOINT/BURSA W/US: CPT | Mod: LT | Performed by: FAMILY MEDICINE

## 2021-10-21 RX ORDER — METHYLPREDNISOLONE ACETATE 40 MG/ML
40 INJECTION, SUSPENSION INTRA-ARTICULAR; INTRALESIONAL; INTRAMUSCULAR; SOFT TISSUE
Status: DISCONTINUED | OUTPATIENT
Start: 2021-10-21 | End: 2022-10-12

## 2021-10-21 RX ADMIN — METHYLPREDNISOLONE ACETATE 40 MG: 40 INJECTION, SUSPENSION INTRA-ARTICULAR; INTRALESIONAL; INTRAMUSCULAR; SOFT TISSUE at 08:41

## 2021-10-21 NOTE — LETTER
10/21/2021         RE: Conor Contreras  [address] [facility] [facility] [address]        Dear Colleague,    Thank you for referring your patient, Conor Contreras, to the [facility] [place]. Please see a copy of my visit note below.    Mr. Contreras is following up with chronic left shoulder pain.  He's here for a repeat AC joint injection.          [facility] FOLLOW-UP VISIT 10/21/2021    Conor Contreras's chief complaint for this visit includes:  Chief Complaint   Patient presents with     RECHECK     left shoulder AC joint injection      PCP: Rocky Yi      Interval History:     Follow up reason: left AC joint injection     Medical History:    Any recent changes to your medical history? No    Any new medication prescribed since last visit? No    Review of Systems:    Do you have fever, chills, weight loss? No    Do you have any vision problems? No    Do you have any chest pain or edema? No    Do you have any shortness of breath or wheezing?  No    Do you have stomach problems? No    Do you have any urinary track issues? No    Do you have any numbness or focal weakness? No    Do you have diabetes? No    Do you have problems with bleeding or clotting? No    Do you have an rashes or other skin lesions? No    Medium Joint Injection/Arthrocentesis: L acromioclavicular    Date/Time: 10/21/2021 8:41 AM  Performed by: Favian Pozo DO  Authorized by: Favian Pozo DO     Indications:  Pain  Needle Size:  22 G  Guidance: ultrasound    Location:  Shoulder  Site:  L acromioclavicular  Medications:  40 mg methylPREDNISolone 40 MG/ML  Outcome:  Tolerated well, no immediate complications  Procedure discussed: discussed risks, benefits, and alternatives    Consent Given by:  Patient  Timeout: timeout called immediately prior to procedure    Prep: patient was prepped and draped in usual sterile fashion     PROCEDURE    Acromioclavicular Injection -  "Ultrasound Guided    The patient was informed of the risks and the benefits of the procedure and a written consent was signed.    The patient s left acromioclavicular joint was prepped with chlorhexidine in sterile fashion.   40 mg of depomedrol suspension was drawn up into a 3 mL syringe with 1 mL of 1% lidocaine.  Injection was performed using sterile technique.  Under ultrasound guidance a 1.5\" 22-gauge needle was used to enter the acromioclavicular joint.  Needle placement was visualized and documented with ultrasound.  Needle placed in short axis to the probe.  Ultrasound visualization was necessary due to decreased joint space in the setting of osteoarthritis.  Images were permanently stored for the patient's record.    There were no complications. The patient tolerated the procedure well. There was negligible bleeding.                         Again, thank you for allowing me to participate in the care of your patient.        Sincerely,        Favian Pozo DO    "

## 2021-10-21 NOTE — PROGRESS NOTES
Mr. Contreras is following up with chronic left shoulder pain.  He's here for a repeat AC joint injection.          [facility] FOLLOW-UP VISIT 10/21/2021    Conor Contreras's chief complaint for this visit includes:  Chief Complaint   Patient presents with     RECHECK     left shoulder AC joint injection      PCP: Rocky Yi      Interval History:     Follow up reason: left AC joint injection     Medical History:    Any recent changes to your medical history? No    Any new medication prescribed since last visit? No    Review of Systems:    Do you have fever, chills, weight loss? No    Do you have any vision problems? No    Do you have any chest pain or edema? No    Do you have any shortness of breath or wheezing?  No    Do you have stomach problems? No    Do you have any urinary track issues? No    Do you have any numbness or focal weakness? No    Do you have diabetes? No    Do you have problems with bleeding or clotting? No    Do you have an rashes or other skin lesions? No    Medium Joint Injection/Arthrocentesis: L acromioclavicular    Date/Time: 10/21/2021 8:41 AM  Performed by: Favian Pozo DO  Authorized by: Favian Pozo DO     Indications:  Pain  Needle Size:  22 G  Guidance: ultrasound    Location:  Shoulder  Site:  L acromioclavicular  Medications:  40 mg methylPREDNISolone 40 MG/ML  Outcome:  Tolerated well, no immediate complications  Procedure discussed: discussed risks, benefits, and alternatives    Consent Given by:  Patient  Timeout: timeout called immediately prior to procedure    Prep: patient was prepped and draped in usual sterile fashion     PROCEDURE    Acromioclavicular Injection - Ultrasound Guided    The patient was informed of the risks and the benefits of the procedure and a written consent was signed.    The patient s left acromioclavicular joint was prepped with chlorhexidine in sterile fashion.   40 mg of depomedrol suspension was drawn up into a 3 mL  "syringe with 1 mL of 1% lidocaine.  Injection was performed using sterile technique.  Under ultrasound guidance a 1.5\" 22-gauge needle was used to enter the acromioclavicular joint.  Needle placement was visualized and documented with ultrasound.  Needle placed in short axis to the probe.  Ultrasound visualization was necessary due to decreased joint space in the setting of osteoarthritis.  Images were permanently stored for the patient's record.    There were no complications. The patient tolerated the procedure well. There was negligible bleeding.                     "

## 2021-10-28 ENCOUNTER — THERAPY VISIT (OUTPATIENT)
Dept: PHYSICAL THERAPY | Facility: CLINIC | Age: 51
End: 2021-10-28
Payer: COMMERCIAL

## 2021-10-28 DIAGNOSIS — M25.512 CHRONIC LEFT SHOULDER PAIN: ICD-10-CM

## 2021-10-28 DIAGNOSIS — G89.29 CHRONIC LEFT SHOULDER PAIN: ICD-10-CM

## 2021-10-28 PROCEDURE — 97161 PT EVAL LOW COMPLEX 20 MIN: CPT | Mod: GP

## 2021-10-28 PROCEDURE — 97110 THERAPEUTIC EXERCISES: CPT | Mod: GP

## 2021-10-28 NOTE — PROGRESS NOTES
Physical Therapy Initial Evaluation  Subjective:  The history is provided by the patient. No  was used.   Therapist Generated HPI Evaluation  Problem details: The patient reports that his left shoulder is injured due to overuse/exercising.  He has had cortisone injections which have helped some. He reports that the injury he recalls occurring from a bicep curl to overhead press with a dumbbell about a year. He had an injection which helped for about 6 months to the point where he had no pain.  Soon he ended up having some phantom/bizzare pain return this past February.  He got a second injection in April that helped again. The last couple of months the pain started to return again. He starts getting upper trap pain and upper trap fatigue bilaterally. 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 .         Type of problem:  Left shoulder.    This is a recurrent condition.  Condition occurred with:  Repetition/overuse.    Patient reports pain:  Upper trap, lateral and posterior.  Pain is described as burning (Fatiguing/Exhaustion with Burning) and is intermittent.  Pain radiates to:  Upper arm.     Symptoms are exacerbated by certain positions  and relieved by rest and other (Certain positions).  Special tests included:  X-ray and MRI.  Previous treatment includes physical therapy. There was none (Did virtual PT and discontinued after the injection) improvement following previous treatment.  Restrictions due to condition include:  Working in normal job without restrictions.  Barriers include:  None as reported by patient.    Patient Health History  Conor Contreras being seen for Pain.     Problem began: 4/28/2020.   Problem occurred: Overuse   Pain is reported as 1/10 on pain scale.  General health as reported by patient is good.  Pertinent medical history includes: sleep disorder/apnea.   Red flags:  None as reported by patient.          Current medications:  Anti-depressants.    Current occupation is Mental Health Counselor.   Primary job tasks include:  Computer work.                                    Objective:  System                   Shoulder Evaluation:  ROM:  AROM:  normal                              Pain: Pain with MMT of Shoulder Abduction at 90 degrees    Strength:  : All strength 5/5 but left shoulder abduction was painful.                        Special Tests:      Left shoulder negative for the following special tests:  Labral; Impingement; Bursal; Neural Tension; Rotator cuff tear and Acromioclavicular    Palpation:  Palpation assessed shoulder: No tenderness on palpation of the shoulder in any area.                                         General     ROS    Assessment/Plan:    Patient is a 51 year old male with left side shoulder complaints.    Patient has the following significant findings with corresponding treatment plan.                Diagnosis 1:  Left Shoulder Pain  Pain -  home program    Therapy Evaluation Codes:   1) History comprised of:   Personal factors that impact the plan of care:      None.    Comorbidity factors that impact the plan of care are:      None.     Medications impacting care: None.  2) Examination of Body Systems comprised of:   Body structures and functions that impact the plan of care:      Shoulder.   Activity limitations that impact the plan of care are:      Lifting.  3) Clinical presentation characteristics are:   Stable/Uncomplicated.  4) Decision-Making    Low complexity using standardized patient assessment instrument and/or measureable assessment of functional outcome.  Cumulative Therapy Evaluation is: Low complexity.    Previous and current functional limitations:  (See Goal Flow Sheet for this information)    Short term and Long term goals: (See Goal Flow Sheet for this information)     Communication ability:  Patient appears to be able to clearly communicate and understand verbal and  written communication and follow directions correctly.  Treatment Explanation - The following has been discussed with the patient:   RX ordered/plan of care  Anticipated outcomes  Possible risks and side effects  This patient would benefit from PT intervention to resume normal activities.   Rehab potential is good.    Frequency:  One X week, once daily  Duration:  for 4 visits  Discharge Plan:  Independent in home treatment program.  Reach maximal therapeutic benefit.    Please refer to the daily flowsheet for treatment today, total treatment time and time spent performing 1:1 timed codes.

## 2021-11-11 ENCOUNTER — THERAPY VISIT (OUTPATIENT)
Dept: PHYSICAL THERAPY | Facility: CLINIC | Age: 51
End: 2021-11-11
Payer: COMMERCIAL

## 2021-11-11 DIAGNOSIS — M25.512 CHRONIC LEFT SHOULDER PAIN: ICD-10-CM

## 2021-11-11 DIAGNOSIS — G89.29 CHRONIC LEFT SHOULDER PAIN: ICD-10-CM

## 2021-11-11 PROCEDURE — 97110 THERAPEUTIC EXERCISES: CPT | Mod: GP

## 2021-11-11 PROCEDURE — 97140 MANUAL THERAPY 1/> REGIONS: CPT | Mod: GP

## 2021-12-02 ENCOUNTER — THERAPY VISIT (OUTPATIENT)
Dept: PHYSICAL THERAPY | Facility: CLINIC | Age: 51
End: 2021-12-02
Payer: COMMERCIAL

## 2021-12-02 DIAGNOSIS — M25.512 CHRONIC LEFT SHOULDER PAIN: ICD-10-CM

## 2021-12-02 DIAGNOSIS — G89.29 CHRONIC LEFT SHOULDER PAIN: ICD-10-CM

## 2021-12-02 PROCEDURE — 97110 THERAPEUTIC EXERCISES: CPT | Mod: GP

## 2021-12-16 ENCOUNTER — THERAPY VISIT (OUTPATIENT)
Dept: PHYSICAL THERAPY | Facility: CLINIC | Age: 51
End: 2021-12-16
Payer: COMMERCIAL

## 2021-12-16 DIAGNOSIS — G89.29 CHRONIC LEFT SHOULDER PAIN: ICD-10-CM

## 2021-12-16 DIAGNOSIS — M25.512 CHRONIC LEFT SHOULDER PAIN: ICD-10-CM

## 2021-12-16 PROCEDURE — 97110 THERAPEUTIC EXERCISES: CPT | Mod: GP

## 2021-12-16 NOTE — PROGRESS NOTES
Subjective:  HPI  Physical Exam                    Objective:  System    Physical Exam    General     ROS    Assessment/Plan:    DISCHARGE REPORT    Progress reporting period is from 10/28/2021 to 12/16/2021.       SUBJECTIVE  Subjective: The patient reports the shoulder is feeling better and he hasn't had very many problems with it lately. The exercises are going well and he doesn't have any complaints. He feels he has learned a lot throughout his time at therapy.  Current Pain level: 0/10.      Initial Pain level: 1/10.   Changes in function:  Yes (See Goal flowsheet attached for changes in current functional level)  Adverse reaction to treatment or activity: None    OBJECTIVE  Changes noted in objective findings:  Yes  Objective: SPADI: 2 (1.54 score). Proper scapular mechanics with Bench Press bilaterally, Shoulder Press bilaterally and scapular retraction motions.    ASSESSMENT/PLAN  Updated problem list and treatment plan: Diagnosis 1:  Left Shoulder pain  Pain -  hot/cold therapy, manual therapy and home program  Decreased ROM/flexibility - manual therapy, therapeutic exercise, therapeutic activity and home program  Decreased strength - therapeutic exercise, therapeutic activities and home program  STG/LTGs have been met or progress has been made towards goals:  Yes (See Goal flow sheet completed today.)  Assessment of Progress: The patient's condition is improving.  Self Management Plans:  Patient has been instructed in a home treatment program.  Patient is independent in a home treatment program.  Patient  has been instructed in self management of symptoms.  Patient is independent in self management of symptoms.  I have re-evaluated this patient and find that the nature, scope, duration and intensity of the therapy is appropriate for the medical condition of the patient.  Conor continues to require the following intervention to meet STG and LTG's:  PT intervention is no longer required to meet  STG/LTG.    Recommendations:  This patient is ready to be discharged from therapy and continue their home treatment program.    Please refer to the daily flowsheet for treatment today, total treatment time and time spent performing 1:1 timed codes.

## 2022-01-16 ENCOUNTER — HEALTH MAINTENANCE LETTER (OUTPATIENT)
Age: 52
End: 2022-01-16

## 2022-09-09 NOTE — NURSING NOTE
Conor Contreras's goals for this visit include:   Chief Complaint   Patient presents with     Consult     Consult, Donnie, on Cpap since 2012. SS done in 2011       He requests these members of his care team be copied on today's visit information: no    PCP: Rocky Yi    Referring Provider:  No referring provider defined for this encounter.    /76   Pulse 76   SpO2 99%     Do you need any medication refills at today's visit? no   Patient has a history of febrile seizures.  Today she developed congestion and cough.  She has no sick contacts.  She does attend .  Prior to arrival, patient developed grand mal seizure activity.  Prehospital care discussed with EMS.  No antiseizure medicine given.  Vital signs noted.  Patient appears postictal.  She withdraws to painful stimuli.  NCAT.  Pupils are equal.  Right TM is red greater than left TM.  Throat is clear.  Neck is supple.  Chest is clear.  Heart regular rate no murmur.  Abdomen is nontender.  There is symmetric motor activity.  This is likely a febrile seizure.  Labs ordered.  Will observe in the emergency department.  Case discussed with grandmother at bedside.

## 2022-09-26 ENCOUNTER — MYC MEDICAL ADVICE (OUTPATIENT)
Dept: FAMILY MEDICINE | Facility: CLINIC | Age: 52
End: 2022-09-26

## 2022-10-09 ASSESSMENT — ENCOUNTER SYMPTOMS
EYE PAIN: 0
WEAKNESS: 0
PARESTHESIAS: 0
HEMATURIA: 0
DYSURIA: 0
DIZZINESS: 0
NERVOUS/ANXIOUS: 0
CONSTIPATION: 0
ABDOMINAL PAIN: 0
HEADACHES: 0
HEARTBURN: 0
DIARRHEA: 0
MYALGIAS: 0
HEMATOCHEZIA: 0
ARTHRALGIAS: 0
COUGH: 0
SORE THROAT: 0
FEVER: 0
PALPITATIONS: 0
CHILLS: 0
SHORTNESS OF BREATH: 0
JOINT SWELLING: 0
NAUSEA: 0
FREQUENCY: 0

## 2022-10-12 ENCOUNTER — OFFICE VISIT (OUTPATIENT)
Dept: FAMILY MEDICINE | Facility: CLINIC | Age: 52
End: 2022-10-12
Payer: COMMERCIAL

## 2022-10-12 VITALS
HEIGHT: 70 IN | RESPIRATION RATE: 14 BRPM | OXYGEN SATURATION: 96 % | HEART RATE: 75 BPM | TEMPERATURE: 97.5 F | SYSTOLIC BLOOD PRESSURE: 115 MMHG | WEIGHT: 203.8 LBS | DIASTOLIC BLOOD PRESSURE: 75 MMHG | BODY MASS INDEX: 29.18 KG/M2

## 2022-10-12 DIAGNOSIS — Z00.00 ROUTINE GENERAL MEDICAL EXAMINATION AT A HEALTH CARE FACILITY: Primary | ICD-10-CM

## 2022-10-12 DIAGNOSIS — Z23 NEED FOR PROPHYLACTIC VACCINATION AND INOCULATION AGAINST INFLUENZA: ICD-10-CM

## 2022-10-12 DIAGNOSIS — Z12.11 SCREEN FOR COLON CANCER: ICD-10-CM

## 2022-10-12 DIAGNOSIS — Z23 HIGH PRIORITY FOR 2019-NCOV VACCINE: ICD-10-CM

## 2022-10-12 DIAGNOSIS — Z23 NEED FOR SHINGLES VACCINE: ICD-10-CM

## 2022-10-12 DIAGNOSIS — G47.33 OSA (OBSTRUCTIVE SLEEP APNEA): ICD-10-CM

## 2022-10-12 LAB
ALBUMIN SERPL-MCNC: 3.9 G/DL (ref 3.4–5)
ALP SERPL-CCNC: 114 U/L (ref 40–150)
ALT SERPL W P-5'-P-CCNC: 56 U/L (ref 0–70)
ANION GAP SERPL CALCULATED.3IONS-SCNC: 2 MMOL/L (ref 3–14)
AST SERPL W P-5'-P-CCNC: 33 U/L (ref 0–45)
BASOPHILS # BLD AUTO: 0 10E3/UL (ref 0–0.2)
BASOPHILS NFR BLD AUTO: 1 %
BILIRUB SERPL-MCNC: 0.5 MG/DL (ref 0.2–1.3)
BUN SERPL-MCNC: 20 MG/DL (ref 7–30)
CALCIUM SERPL-MCNC: 9.7 MG/DL (ref 8.5–10.1)
CHLORIDE BLD-SCNC: 102 MMOL/L (ref 94–109)
CHOLEST SERPL-MCNC: 221 MG/DL
CO2 SERPL-SCNC: 31 MMOL/L (ref 20–32)
CREAT SERPL-MCNC: 0.89 MG/DL (ref 0.66–1.25)
EOSINOPHIL # BLD AUTO: 0.3 10E3/UL (ref 0–0.7)
EOSINOPHIL NFR BLD AUTO: 5 %
ERYTHROCYTE [DISTWIDTH] IN BLOOD BY AUTOMATED COUNT: 12.2 % (ref 10–15)
FASTING STATUS PATIENT QL REPORTED: YES
GFR SERPL CREATININE-BSD FRML MDRD: >90 ML/MIN/1.73M2
GLUCOSE BLD-MCNC: 89 MG/DL (ref 70–99)
HCT VFR BLD AUTO: 42.8 % (ref 40–53)
HDLC SERPL-MCNC: 45 MG/DL
HGB BLD-MCNC: 14.6 G/DL (ref 13.3–17.7)
IMM GRANULOCYTES # BLD: 0 10E3/UL
IMM GRANULOCYTES NFR BLD: 0 %
LDLC SERPL CALC-MCNC: 154 MG/DL
LYMPHOCYTES # BLD AUTO: 1.6 10E3/UL (ref 0.8–5.3)
LYMPHOCYTES NFR BLD AUTO: 33 %
MCH RBC QN AUTO: 30.2 PG (ref 26.5–33)
MCHC RBC AUTO-ENTMCNC: 34.1 G/DL (ref 31.5–36.5)
MCV RBC AUTO: 89 FL (ref 78–100)
MONOCYTES # BLD AUTO: 0.5 10E3/UL (ref 0–1.3)
MONOCYTES NFR BLD AUTO: 11 %
NEUTROPHILS # BLD AUTO: 2.4 10E3/UL (ref 1.6–8.3)
NEUTROPHILS NFR BLD AUTO: 51 %
NONHDLC SERPL-MCNC: 176 MG/DL
PLATELET # BLD AUTO: 140 10E3/UL (ref 150–450)
POTASSIUM BLD-SCNC: 4.4 MMOL/L (ref 3.4–5.3)
PROT SERPL-MCNC: 7.8 G/DL (ref 6.8–8.8)
RBC # BLD AUTO: 4.83 10E6/UL (ref 4.4–5.9)
SODIUM SERPL-SCNC: 135 MMOL/L (ref 133–144)
TRIGL SERPL-MCNC: 110 MG/DL
WBC # BLD AUTO: 4.8 10E3/UL (ref 4–11)

## 2022-10-12 PROCEDURE — 90682 RIV4 VACC RECOMBINANT DNA IM: CPT | Performed by: INTERNAL MEDICINE

## 2022-10-12 PROCEDURE — 36415 COLL VENOUS BLD VENIPUNCTURE: CPT | Performed by: INTERNAL MEDICINE

## 2022-10-12 PROCEDURE — 0124A COVID-19,PF,PFIZER BOOSTER BIVALENT: CPT | Performed by: INTERNAL MEDICINE

## 2022-10-12 PROCEDURE — 99396 PREV VISIT EST AGE 40-64: CPT | Mod: 25 | Performed by: INTERNAL MEDICINE

## 2022-10-12 PROCEDURE — 90471 IMMUNIZATION ADMIN: CPT | Performed by: INTERNAL MEDICINE

## 2022-10-12 PROCEDURE — 85025 COMPLETE CBC W/AUTO DIFF WBC: CPT | Performed by: INTERNAL MEDICINE

## 2022-10-12 PROCEDURE — 80061 LIPID PANEL: CPT | Performed by: INTERNAL MEDICINE

## 2022-10-12 PROCEDURE — 80053 COMPREHEN METABOLIC PANEL: CPT | Performed by: INTERNAL MEDICINE

## 2022-10-12 PROCEDURE — 91312 COVID-19,PF,PFIZER BOOSTER BIVALENT: CPT | Performed by: INTERNAL MEDICINE

## 2022-10-12 ASSESSMENT — ENCOUNTER SYMPTOMS
ARTHRALGIAS: 0
NAUSEA: 0
WEAKNESS: 0
FREQUENCY: 0
CHILLS: 0
ABDOMINAL PAIN: 0
HEMATOCHEZIA: 0
NERVOUS/ANXIOUS: 0
EYE PAIN: 0
DIARRHEA: 0
HEARTBURN: 0
HEMATURIA: 0
PARESTHESIAS: 0
JOINT SWELLING: 0
FEVER: 0
COUGH: 0
PALPITATIONS: 0
DIZZINESS: 0
SORE THROAT: 0
SHORTNESS OF BREATH: 0
MYALGIAS: 0
HEADACHES: 0
DYSURIA: 0
CONSTIPATION: 0

## 2022-10-12 ASSESSMENT — PATIENT HEALTH QUESTIONNAIRE - PHQ9
SUM OF ALL RESPONSES TO PHQ QUESTIONS 1-9: 1
10. IF YOU CHECKED OFF ANY PROBLEMS, HOW DIFFICULT HAVE THESE PROBLEMS MADE IT FOR YOU TO DO YOUR WORK, TAKE CARE OF THINGS AT HOME, OR GET ALONG WITH OTHER PEOPLE: NOT DIFFICULT AT ALL
SUM OF ALL RESPONSES TO PHQ QUESTIONS 1-9: 1

## 2022-10-12 ASSESSMENT — PAIN SCALES - GENERAL: PAINLEVEL: NO PAIN (0)

## 2022-10-12 NOTE — LETTER
Conor Contreras  [address] [place] [address]      October 12, 2022      To Whom It May Concern:                                                This is regarding . Conor Contreras. He was seen today for an annual preventative health exam. Please excuse Mr. Contreras for not starting work in a timely manner. For any questions, you may call my office at [phone].    Sincerely,         Rocky Yi MD  Internal Medicine

## 2022-10-12 NOTE — PROGRESS NOTES
SUBJECTIVE:   CC: Khoa is an 52 year old who presents for preventative health visit.     Patient has been advised of split billing requirements and indicates understanding: Yes     Healthy Habits:     Getting at least 3 servings of Calcium per day:  NO    Bi-annual eye exam:  Yes    Dental care twice a year:  Yes    Sleep apnea or symptoms of sleep apnea:  Sleep apnea    Diet:  Regular (no restrictions)    Frequency of exercise:  2-3 days/week    Duration of exercise:  15-30 minutes    Taking medications regularly:  Yes    Medication side effects:  None    PHQ-2 Total Score: 0    Additional concerns today:  Yes        Today's PHQ-2 Score:   PHQ-2 (  [employer]) 10/9/2022   Q1: Little interest or pleasure in doing things 0   Q2: Feeling down, depressed or hopeless 0   PHQ-2 Score 0   PHQ-2 Total Score (12-17 Years)- Positive if 3 or more points; Administer PHQ-A if positive -   Q1: Little interest or pleasure in doing things Not at all   Q2: Feeling down, depressed or hopeless Not at all   PHQ-2 Score 0       Abuse: Current or Past(Physical, Sexual or Emotional)- No  Do you feel safe in your environment? Yes    Have you ever done Advance Care Planning? (For example, a Health Directive, POLST, or a discussion with a medical provider or your loved ones about your wishes): No, advance care planning information given to patient to review.  Advanced care planning was discussed at today's visit.    Social History     Tobacco Use     Smoking status: Former     Packs/day: 0.50     Years: 10.00     Pack years: 5.00     Types: Cigarettes     Quit date: 3/18/2001     Years since quittin.5     Smokeless tobacco: Never     Tobacco comments:     QUIT 9 YEARS AGO   Substance Use Topics     Alcohol use: Yes     Alcohol/week: 0.0 standard drinks     Comment: very rarely     If you drink alcohol do you typically have >3 drinks per day or >7 drinks per week? No    Alcohol Use 10/9/2022   Prescreen: >3 drinks/day or >7  drinks/week? No   Prescreen: >3 drinks/day or >7 drinks/week? -   No flowsheet data found.    Last PSA:   PSA   Date Value Ref Range Status   2019 0.32 0 - 4 ug/L Final     Comment:     Assay Method:  Chemiluminescence using Siemens Vista analyzer       Reviewed orders with patient. Reviewed health maintenance and updated orders accordingly - Yes  Labs reviewed in EPIC  BP Readings from Last 3 Encounters:   10/12/22 115/75   21 114/76   10/28/20 119/79    Wt Readings from Last 3 Encounters:   10/12/22 92.4 kg (203 lb 12.8 oz)   10/21/21 90.3 kg (199 lb)   10/11/21 90.4 kg (199 lb 4.8 oz)                  Patient Active Problem List   Diagnosis     ADD (attention deficit disorder)     HARLAN (obstructive sleep apnea)     Hyperlipidemia LDL goal <160     Generalized anxiety disorder     Benign skin lesion of face     Dysthymia     Allergic rhinitis     Abnormal CXR perihilar nodes on CT chest     Palpitations     Mediastinal lymphadenopathy     Cervical myofascial strain, sequela     Past Surgical History:   Procedure Laterality Date     ENDOBRONCHIAL ULTRASOUND FLEXIBLE N/A 2015    Procedure: ENDOBRONCHIAL ULTRASOUND FLEXIBLE;  Surgeon: Ramakrishna Williamson MD;  Location: [facility]     VASECTOMY         Social History     Tobacco Use     Smoking status: Former     Packs/day: 0.50     Years: 10.00     Pack years: 5.00     Types: Cigarettes     Quit date: 3/18/2001     Years since quittin.5     Smokeless tobacco: Never     Tobacco comments:     QUIT 9 YEARS AGO   Substance Use Topics     Alcohol use: Yes     Alcohol/week: 0.0 standard drinks     Comment: very rarely     Family History   Problem Relation Age of Onset     C.A.D. Maternal Grandfather      Alcohol/Drug Maternal Grandfather          from alcoholism     Cerebrovascular Disease Maternal Grandfather      Lipids Father      Cancer Maternal Grandmother         Pancreatic     Cancer Paternal Grandmother         Lung, may not have been  primary     Heart Disease Paternal Grandfather      Anxiety Disorder Sister      Anxiety Disorder Sister      LUNG DISEASE Maternal Uncle         Asthma versus sarcoid     Asthma No family hx of      Diabetes No family hx of      Hypertension No family hx of      Breast Cancer No family hx of      Cancer - colorectal No family hx of      Prostate Cancer No family hx of      Connective Tissue Disorder No family hx of      Thyroid Disease No family hx of          Current Outpatient Medications   Medication Sig Dispense Refill     amphetamine-dextroamphetamine (ADDERALL XR) 15 MG per 24 hr capsule TAKE ONE CAPSULE BY MOUTH EVERY MORNING PLEASE MAKE AN APPOINTMENT FOR FUTURE REFILLS  0     baclofen (LIORESAL) 10 MG tablet TAKE 1 TABLET(10 MG) BY MOUTH THREE TIMES DAILY AS NEEDED FOR MUSCLE SPASMS 90 tablet 3     cetirizine (ZYRTEC) 10 MG tablet Take 1 tablet (10 mg) by mouth At Bedtime 30 tablet 11     venlafaxine (EFFEXOR XR) 150 MG 24 hr capsule Take 150 mg by mouth daily       No Known Allergies  Recent Labs   Lab Test 10/28/20  0808 09/23/19  0838 04/05/16  1147 01/07/15  0829   * 124*  --  122   HDL 45 39*  --  50   TRIG 102 79  --  114   ALT 52 50 67 56   CR 0.90 0.88  --  0.97   GFRESTIMATED >90 >90  --  83   GFRESTBLACK >90 >90  --  >90  African American GFR Calc     POTASSIUM 3.7 4.0  --  3.7   TSH  --   --   --  1.46        Reviewed and updated as needed this visit by clinical staff                  Reviewed and updated as needed this visit by Provider                     Review of Systems   Constitutional: Negative for chills and fever.   HENT: Negative for congestion, ear pain, hearing loss and sore throat.    Eyes: Negative for pain and visual disturbance.   Respiratory: Negative for cough and shortness of breath.    Cardiovascular: Negative for chest pain, palpitations and peripheral edema.   Gastrointestinal: Negative for abdominal pain, constipation, diarrhea, heartburn, hematochezia and nausea.  "  Genitourinary: Negative for dysuria, frequency, genital sores, hematuria, impotence, penile discharge and urgency.   Musculoskeletal: Negative for arthralgias, joint swelling and myalgias.   Skin: Negative for rash.   Neurological: Negative for dizziness, weakness, headaches and paresthesias.   Psychiatric/Behavioral: Negative for mood changes. The patient is not nervous/anxious.        OBJECTIVE:   /75 (BP Location: Left arm, Patient Position: Sitting, Cuff Size: Adult Large)   Pulse 75   Temp 97.5  F (36.4  C) (Oral)   Resp 14   Ht 1.778 m (5' 10\")   Wt 92.4 kg (203 lb 12.8 oz)   SpO2 96%   BMI 29.24 kg/m    Physical Exam  GENERAL: healthy, alert and no distress  EYES: Eyes grossly normal to inspection  HENT: normal cephalic/atraumatic  RESP: lungs clear to auscultation - no rales, rhonchi or wheezes  CV: regular rate and rhythm, normal S1 S2, no S3 or S4, no murmur, click or rub, no peripheral edema and peripheral pulses strong  ABDOMEN: soft, nontender, without hepatosplenomegaly or masses and bowel sounds normal  MS: no gross musculoskeletal defects noted, no edema  NEURO: Normal strength and tone, mentation intact and speech normal  PSYCH: mentation appears normal, affect normal/bright    Diagnostic Test Results:  Labs reviewed in Epic    ASSESSMENT/PLAN:     Routine general medical examination at a health care facility  - REVIEW OF HEALTH MAINTENANCE PROTOCOL ORDERS  - CBC with platelets and differential  - Comprehensive metabolic panel  - Lipid panel reflex to direct LDL Fasting    Screen for colon cancer  - Fecal colorectal cancer screen FIT - Future (S+30); Future  - Fecal colorectal cancer screen FIT - Future (S+30)    Need for shingles vaccine  - ZOSTER VACCINE RECOMBINANT ADJUVANTED (SHINGRIX); Standing    Need for prophylactic vaccination and inoculation against influenza  - INFLUENZA QUAD, RECOMBINANT, P-FREE (RIV4) (FLUBLOK) AGE 50-64 [[id number]]    High priority for 2019-nCoV vaccine  - " "COVID-19,PF,PFIZER BOOSTER BIVALENT    HARLAN (obstructive sleep apnea)  - Adult Sleep Eval & Management  Referral; Future      Patient has been advised of split billing requirements and indicates understanding: Yes    COUNSELING:   Special attention given to:        Regular exercise       Healthy diet/nutrition       Immunizations    Vaccinated for: Influenza and Covid-19.           Colorectal cancer screening       The 10-year ASCVD risk score (Guillermo MAYS, et al., 2019) is: 3.9%    Values used to calculate the score:      Age: 52 years      Sex: Male      Is Non- : No      Diabetic: No      Tobacco smoker: No      Systolic Blood Pressure: 115 mmHg      Is BP treated: No      HDL Cholesterol: 45 mg/dL      Total Cholesterol: 185 mg/dL    Estimated body mass index is 29.14 kg/m  as calculated from the following:    Height as of 10/11/21: 1.76 m (5' 9.29\").    Weight as of 10/21/21: 90.3 kg (199 lb).     Weight management plan: diet and exercise.    He reports that he quit smoking about 21 years ago. His smoking use included cigarettes. He has a 5.00 pack-year smoking history. He has never used smokeless tobacco.      Counseling Resources:  ATP IV Guidelines  Pooled Cohorts Equation Calculator  FRAX Risk Assessment  ICSI Preventive Guidelines  Dietary Guidelines for Americans, 2010  USDA's MyPlate  ASA Prophylaxis  Lung CA Screening    Rocky Yi MD  [facility]  Answers for HPI/ROS submitted by the patient on 10/12/2022  If you checked off any problems, how difficult have these problems made it for you to do your work, take care of things at home, or get along with other people?: Not difficult at all  PHQ9 TOTAL SCORE: 1      "

## 2022-11-02 PROCEDURE — 82274 ASSAY TEST FOR BLOOD FECAL: CPT | Performed by: INTERNAL MEDICINE

## 2022-11-04 LAB — HEMOCCULT STL QL IA: NEGATIVE

## 2022-11-22 ENCOUNTER — E-VISIT (OUTPATIENT)
Dept: FAMILY MEDICINE | Facility: CLINIC | Age: 52
End: 2022-11-22
Payer: COMMERCIAL

## 2022-11-22 ENCOUNTER — MYC MEDICAL ADVICE (OUTPATIENT)
Dept: FAMILY MEDICINE | Facility: CLINIC | Age: 52
End: 2022-11-22

## 2022-11-22 DIAGNOSIS — J11.1 INFLUENZA WITH RESPIRATORY MANIFESTATION OTHER THAN PNEUMONIA: Primary | ICD-10-CM

## 2022-11-22 PROCEDURE — 99421 OL DIG E/M SVC 5-10 MIN: CPT | Performed by: INTERNAL MEDICINE

## 2022-11-23 RX ORDER — OSELTAMIVIR PHOSPHATE 75 MG/1
75 CAPSULE ORAL 2 TIMES DAILY
Qty: 10 CAPSULE | Refills: 0 | Status: SHIPPED | OUTPATIENT
Start: 2022-11-23 | End: 2023-06-29

## 2023-02-13 ENCOUNTER — TELEPHONE (OUTPATIENT)
Dept: SLEEP MEDICINE | Facility: CLINIC | Age: 53
End: 2023-02-13
Payer: COMMERCIAL

## 2023-02-13 NOTE — TELEPHONE ENCOUNTER
Phone call made to patient to discuss CPAP manual download. Patient was remind a CPAP download is needed for visit tomorrow. Patient to bring CPAP to [facility] for manual download. [facility] manual download hours given to patient.     Patient had no further questions.     Shalini Engel YELENA  [facility]

## 2023-02-14 ENCOUNTER — VIRTUAL VISIT (OUTPATIENT)
Dept: SLEEP MEDICINE | Facility: CLINIC | Age: 53
End: 2023-02-14
Attending: INTERNAL MEDICINE

## 2023-02-14 VITALS — BODY MASS INDEX: 29.2 KG/M2 | WEIGHT: 204 LBS | HEIGHT: 70 IN

## 2023-02-14 DIAGNOSIS — G47.33 OSA (OBSTRUCTIVE SLEEP APNEA): Primary | ICD-10-CM

## 2023-02-14 PROCEDURE — 99204 OFFICE O/P NEW MOD 45 MIN: CPT | Mod: VID | Performed by: PHYSICIAN ASSISTANT

## 2023-02-14 ASSESSMENT — SLEEP AND FATIGUE QUESTIONNAIRES
HOW LIKELY ARE YOU TO NOD OFF OR FALL ASLEEP WHILE SITTING AND TALKING TO SOMEONE: WOULD NEVER DOZE
HOW LIKELY ARE YOU TO NOD OFF OR FALL ASLEEP WHILE SITTING AND READING: SLIGHT CHANCE OF DOZING
HOW LIKELY ARE YOU TO NOD OFF OR FALL ASLEEP WHILE SITTING INACTIVE IN A PUBLIC PLACE: WOULD NEVER DOZE
HOW LIKELY ARE YOU TO NOD OFF OR FALL ASLEEP IN A CAR, WHILE STOPPED FOR A FEW MINUTES IN TRAFFIC: WOULD NEVER DOZE
HOW LIKELY ARE YOU TO NOD OFF OR FALL ASLEEP WHILE LYING DOWN TO REST IN THE AFTERNOON WHEN CIRCUMSTANCES PERMIT: HIGH CHANCE OF DOZING
HOW LIKELY ARE YOU TO NOD OFF OR FALL ASLEEP WHILE SITTING QUIETLY AFTER LUNCH WITHOUT ALCOHOL: WOULD NEVER DOZE
HOW LIKELY ARE YOU TO NOD OFF OR FALL ASLEEP WHILE WATCHING TV: WOULD NEVER DOZE
HOW LIKELY ARE YOU TO NOD OFF OR FALL ASLEEP WHEN YOU ARE A PASSENGER IN A CAR FOR AN HOUR WITHOUT A BREAK: SLIGHT CHANCE OF DOZING

## 2023-02-14 ASSESSMENT — PAIN SCALES - GENERAL: PAINLEVEL: NO PAIN (0)

## 2023-02-14 NOTE — PROGRESS NOTES
Video-Visit Details    Type of service:  Video Visit    Video Start Time (time video started): 10:56 AM    Video End Time (time video stopped): 11:18 AM    Originating Location (pt. Location): Home        Distant Location (provider location):  On-site    Mode of Communication:  Video Conference via [facility] Sleep Medicine Consultation:      Name: Conor Contreras MRN# [id number]   Age: 52 year old YOB: 1970     Date of Consultation: February 14, 2023  Consultation is requested by: Rocky Yi MD  [id number] [place] [address] Rocky Yi  Primary care provider: Rocky Yi       Reason for Sleep Consult:     Conor Contreras is sent by Rocky Yi for a sleep consultation regarding obstructive sleep apnea.    Patient s Reason for visit  Conor Contreras main reason for visit:  Re-establish care at [facility]  Patient states problem(s) started:    Conor Contreras's goals for this visit:             Assessment and Plan:     Summary Sleep Diagnoses & Recommendations:  Severe obstructive sleep apnea-  Excellent CPAP compliance and AHI is well controlled on CPAP 4-11 cm/H20, though CPAP 90% pressure is11 cm.     Changed to auto-CPAP 6-13 cm/H20  Comprehensive DME order placed and    Summary Recommendations:  Orders Placed This Encounter   Procedures     Comprehensive DME     Sleep Study - HIM Scan         Summary Counseling:    Old Sleep Testing Reviewed with the patient  Obstructive Sleep Apnea Reviewed  Complications of Untreated Sleep Apnea Reviewed      Medical Decision-making:   Educational materials provided in instructions    Total time spent reviewing medical records, history and physical examination, review of previous testing and interpretation as well as documentation on this date:50 minutes    CC: Rocky Yi          History of Present Illness:     Past Sleep Evaluations:    Conor Contreras is a 52 year old male who had a  sleep study for snoring NS witnessed apnea.    Sleep study done at [facility] on 6/1/2011 (181#)-AHI 30.5, RDI 34.9,  lowest oxygen saturation was -%, CPAP 6 cm/H20.     He was diagnosed with severe HARLAN and has been on CPAP 4-11 cmH2O.     Compliance data is available today.    DME is [facility]    Overall, on a scale of 0-10 how would you rate your CPAP (0 poor, 10 great):      What type of mask do you use:  Nasal pillow  Is your mask comfortable:  no  If not, why:      Is your mask leaking:  no  If yes, where do you feel it:    How many night per week does the mask leak (0-7):      Do you notice snoring with mask on:  no  Do you notice gasping arousals with mask on:  no  Are you having significant oral or nasal dryness:  no  Is the pressure setting comfortable:  yes  If not, why:      What is your typical bedtime:  1030-11 pm  How long does it take you to go to sleep on PAP therapy:  10-15 minutes  What time do you typically get out of bed for the day:  630-7 am  How many hours on average per night are you using PAP therapy:  8  How many hours are you sleeping per night:  8  Do you feel well rested in the morning:  Not initially      Respironics  Auto-PAP 4 - 11 cmH2O 30 day usage data:    100% of days with > 4 hours of use. 0/30 days with no use.   Average use 8 hours and 23 minutes per day.   Average large leak 0 LPM.    CPAP 90% pressure 11 cm.   AHI 4.1 events per hour.    Conor Contreras prefers to sleep in this position(s):   back  Patient states they do the following activities in bed:      Naps  Patient takes a purposeful nap  rare   He feels better after a nap:    He dozes off unintentionally   days per week  Patient has had a driving accident or near-miss due to sleepiness/drowsiness:  no      SLEEP DISRUPTIONS:  Movement:  Patient gets pain, discomfort, with an urge to move:   no  It happens when he is resting:     It happens more at night:     Patient has been told he kicks his legs at night:         Behaviors in Sleep:  Conor Contreras has experienced the following behaviors while sleeping:    He has experienced sudden muscle weakness during the day:        Is there anything else you would like your sleep provider to know:      CAFFEINE AND OTHER SUBSTANCES:    Patient consumes caffeinated beverages per day:   2  Last caffeine use is usually:    List of any prescribed or over the counter stimulants that patient takes:    List of any prescribed or over the counter sleep medication patient takes:  Trazodone 12.5 mg  List of previous sleep medications that patient has tried:  Melatonin  Patient drinks alcohol to help them sleep:    Patient drinks alcohol near bedtime:  no    Family History:  Patient has a family member been diagnosed with a sleep disorder:              SCALES:    EPWORTH SLEEPINESS SCALE      Otis Sleepiness Scale ( CHRIS Brantley  1990-1997[facility] [place]/English - Final version - 21 Nov 07 - [facility] [place].) 2/14/2023   Sitting and reading Slight chance of dozing   Watching TV Would never doze   Sitting, inactive in a public place (e.g. a theatre or a meeting) Would never doze   As a passenger in a car for an hour without a break Slight chance of dozing   Lying down to rest in the afternoon when circumstances permit High chance of dozing   Sitting and talking to someone Would never doze   Sitting quietly after a lunch without alcohol Would never doze   In a car, while stopped for a few minutes in traffic Would never doze   Otis Score (MC) 5   Otis Score (Sleep) 5         INSOMNIA SEVERITY INDEX (RILEY)      Insomnia Severity Index (RILEY) 2/14/2023   Difficulty falling asleep 2   Difficulty staying asleep 0   Problems waking up too early 0   How SATISFIED/DISSATISFIED are you with your CURRENT sleep pattern? 2   How NOTICEABLE to others do you think your sleep problem is in terms of impairing the quality of your life? 0   How WORRIED/DISTRESSED are you about your current sleep problem?  "0   To what extent do you consider your sleep problem to INTERFERE with your daily functioning (e.g. daytime fatigue, mood, ability to function at work/daily chores, concentration, memory, mood, etc.) CURRENTLY? 0   RILEY Total Score 4       Guidelines for Scoring/Interpretation:  Total score categories:  0-7 = No clinically significant insomnia   8-14 = Subthreshold insomnia   15-21 = Clinical insomnia (moderate severity)  22-28 = Clinical insomnia (severe)  Used via courtesy of www.PromiseUPealth.va.gov with permission from Jak Hanson PhD., [facility]      STOP BANG     STOP BANG Questionnaire (  2008, the American Society of Anesthesiologists, Inc. [employer]iv[employer].) 2/14/2023   B/P Clinic: -   BMI Clinic: 29.27         GAD7    NICOLA-7  4/27/2018   1. Feeling nervous, anxious, or on edge 0   2. Not being able to stop or control worrying 0   3. Worrying too much about different things 0   4. Trouble relaxing 0   5. Being so restless that it is hard to sit still 0   6. Becoming easily annoyed or irritable 0   7. Feeling afraid, as if something awful might happen 0   NICOLA-7 Total Score 0   If you checked any problems, how difficult have they made it for you to do your work, take care of things at home, or get along with other people? Not difficult at all         CAGE-AID    No flowsheet data found.    CAGE-AID reprinted with permission from the [place] Medical Journal, TAWNYA Benjamin. and ERMA Dc, \"Conjoint screening questionnaires for alcohol and drug abuse\" [place] Medical Journal 94: 135-140, 1995.      PATIENT HEALTH QUESTIONNAIRE-9 (PHQ - 9)    PHQ-9 ([employer]) 10/12/2022   No Interest In Doing Things -   Feeling Depressed -   Trouble Sleeping -   Tired / No Energy -   No appetite or Over-Eating -   Feeling Bad about Self -   Trouble Concentrating -   Moving Slow or Restless -   Suicidal Thoughts -   Total Score -   1.  Little interest or pleasure in doing things 0   2.  Feeling down, " depressed, or hopeless 0   3.  Trouble falling or staying asleep, or sleeping too much 1   4.  Feeling tired or having little energy 0   5.  Poor appetite or overeating 0   6.  Feeling bad about yourself 0   7.  Trouble concentrating 0   8.  Moving slowly or restless 0   9.  Suicidal or self-harm thoughts 0   PHQ-9 Total Score 1   Difficulty at work, home, or with people -   1.  Little interest or pleasure in doing things Not at all   2.  Feeling down, depressed, or hopeless Not at all   3.  Trouble falling or staying asleep, or sleeping too much Several days   4.  Feeling tired or having little energy Not at all   5.  Poor appetite or overeating Not at all   6.  Feeling bad about yourself Not at all   7.  Trouble concentrating Not at all   8.  Moving slowly or restless Not at all   9.  Suicidal or self-harm thoughts Not at all   PHQ-9 via "[employer]hart TOTAL SCORE-----> 1 (Minimal depression)   Difficulty at work, home, or with people Not difficult at all       Developed by Baldomero Soliman, Luz Shaver, Blaise Aguirre and colleagues, with an educational rod from [employer]. No permission required to reproduce, translate, display or distribute.        Allergies:    No Known Allergies    Medications:    Current Outpatient Medications   Medication Sig Dispense Refill     amphetamine-dextroamphetamine (ADDERALL XR) 15 MG per 24 hr capsule TAKE ONE CAPSULE BY MOUTH EVERY MORNING PLEASE MAKE AN APPOINTMENT FOR FUTURE REFILLS  0     baclofen (LIORESAL) 10 MG tablet TAKE 1 TABLET(10 MG) BY MOUTH THREE TIMES DAILY AS NEEDED FOR MUSCLE SPASMS 90 tablet 3     cetirizine (ZYRTEC) 10 MG tablet Take 1 tablet (10 mg) by mouth At Bedtime 30 tablet 11     oseltamivir (TAMIFLU) 75 MG capsule Take 1 capsule (75 mg) by mouth 2 times daily 10 capsule 0     venlafaxine (EFFEXOR XR) 150 MG 24 hr capsule Take 150 mg by mouth daily         Problem List:  Patient Active Problem List    Diagnosis Date Noted     Cervical myofascial  strain, sequela 2018     Priority: Medium     Mediastinal lymphadenopathy 2018     Priority: Medium     Palpitations 2018     Priority: Medium     Abnormal CXR perihilar nodes on CT chest 12/10/2014     Priority: Medium     Allergic rhinitis 2014     Priority: Medium     Dysthymia      Priority: Medium     Benign skin lesion of face 2012     Priority: Medium     Generalized anxiety disorder 2012     Priority: Medium     Diagnosis updated by automated process. Provider to review and confirm.       Hyperlipidemia LDL goal <160 10/27/2011     Priority: Medium     HARLAN (obstructive sleep apnea) 2011     Priority: Medium     Sleep study done at [facility] on 2011 (181#)-AHI 30.5, RDI 34.9,  lowest oxygen saturation was -%, CPAP 6cm/H20       ADD (attention deficit disorder)      Priority: Medium        Past Medical/Surgical History:  Past Medical History:   Diagnosis Date     ADD (attention deficit disorder)      Dysthymia      Elevated lipids      Generalised anxiety disorder 2012     Lung nodules      HARLAN (obstructive sleep apnea) 2011    cpap at night     Past Surgical History:   Procedure Laterality Date     ENDOBRONCHIAL ULTRASOUND FLEXIBLE N/A 2015    Procedure: ENDOBRONCHIAL ULTRASOUND FLEXIBLE;  Surgeon: Ramakrishna Williamson MD;  Location: [facility]     VASECTOMY         Social History:  Social History     Socioeconomic History     Marital status:      Spouse name: Lesly     Number of children: 0     Years of education: Not on file     Highest education level: Not on file   Occupational History     Occupation: Alcohol and drug counselor     Employer: [employer]     Comment: [employer]   Tobacco Use     Smoking status: Former     Packs/day: 0.50     Years: 10.00     Pack years: 5.00     Types: Cigarettes     Quit date: 3/18/2001     Years since quittin.9     Smokeless tobacco: Never     Tobacco comments:     QUIT 9  YEARS AGO   Vaping Use     Vaping Use: Never used   Substance and Sexual Activity     Alcohol use: Yes     Alcohol/week: 0.0 standard drinks     Comment: very rarely     Drug use: No     Comment: h/o drugs, denies past 13 yrs     Sexual activity: Yes     Partners: Male     Comment:  one partner   Other Topics Concern     Parent/sibling w/ CABG, MI or angioplasty before 65F 55M? No      Service No     Blood Transfusions No     Caffeine Concern No     Occupational Exposure No     Hobby Hazards No     Sleep Concern No     Stress Concern No     Weight Concern No     Special Diet No     Back Care No     Exercise Yes     Comment: Sometimes     Bike Helmet No     Seat Belt Yes     Self-Exams No   Social History Narrative    The patient has a 10 or less pk yr tobacco hx.  He has no active use.  Alcohol use is 12 alcoholic drinks per week.   He has hx of recreational drug use with methamphetamines - inhaled, marijuana. Some rare hx of IVDU.         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      Social Determinants of Health     Financial Resource Strain: Not on file   Food Insecurity: Not on file   Transportation Needs: Not on file   Physical Activity: Not on file   Stress: Not on file   Social Connections: Not on file   Intimate Partner Violence: Not on file   Housing Stability: Not on file       Family History:  Family History   Problem Relation Age of Onset     C.A.D. Maternal Grandfather      Alcohol/Drug Maternal Grandfather          from alcoholism     Cerebrovascular Disease Maternal Grandfather      Lipids Father      Cancer Maternal Grandmother         Pancreatic     Cancer Paternal Grandmother         Lung, may not have been primary     Heart Disease Paternal Grandfather      Anxiety Disorder Sister      Anxiety Disorder Sister      LUNG DISEASE Maternal Uncle        "  Asthma versus sarcoid     Asthma No family hx of      Diabetes No family hx of      Hypertension No family hx of      Breast Cancer No family hx of      Cancer - colorectal No family hx of      Prostate Cancer No family hx of      Connective Tissue Disorder No family hx of      Thyroid Disease No family hx of        Review of Systems:  A complete review of systems reviewed by me is negative with the exeption of what has been mentioned in the history of present illness.    Physical Examination:  Vitals: Ht 1.778 m (5' 10\")   Wt 92.5 kg (204 lb)   BMI 29.27 kg/m    BMI= Body mass index is 29.27 kg/m .           GENERAL APPEARANCE: alert and no distress  EYES: Eyes grossly normal to inspection  NECK: no asymmetry, masses, or scars  RESP: breathing is non-labored  NEURO: mentation intact and speech normal  PSYCH: affect normal/bright  Mallampati Class:  Tonsillar Stage:         Data: All pertinent previous laboratory data reviewed     Recent Labs   Lab Test 10/12/22  1037 10/28/20  0808    133   POTASSIUM 4.4 3.7   CHLORIDE 102 99   CO2 31 31   ANIONGAP 2* 3   GLC 89 87   BUN 20 15   CR 0.89 0.90   KATIUSKA 9.7 9.1       Recent Labs   Lab Test 10/12/22  1037   WBC 4.8   RBC 4.83   HGB 14.6   HCT 42.8   MCV 89   MCH 30.2   MCHC 34.1   RDW 12.2   *       Recent Labs   Lab Test 10/12/22  1037   PROTTOTAL 7.8   ALBUMIN 3.9   BILITOTAL 0.5   ALKPHOS 114   AST 33   ALT 56       TSH (mU/L)   Date Value   01/07/2015 1.46   10/27/2011 1.38         PFT: Most Recent [facility]ze Pulmonary Function Testing    FVC-Pred   Date Value Ref Range Status   01/12/2015 5.20 L      FVC-Pre   Date Value Ref Range Status   01/12/2015 4.92 L      FVC-%Pred-Pre   Date Value Ref Range Status   01/12/2015 94 %      FEV1-Pre   Date Value Ref Range Status   01/12/2015 3.91 L      FEV1-%Pred-Pre   Date Value Ref Range Status   01/12/2015 94 %      FEV1FVC-Pred   Date Value Ref Range Status   01/12/2015 80 %      FEV1FVC-Pre   Date Value Ref " Range Status   01/12/2015 79 %      No results found for: 20029  FEFMax-Pred   Date Value Ref Range Status   01/12/2015 10.11 L/sec      FEFMax-Pre   Date Value Ref Range Status   01/12/2015 7.89 L/sec      FEFMax-%Pred-Pre   Date Value Ref Range Status   01/12/2015 78 %      ExpTime-Pre   Date Value Ref Range Status   01/12/2015 7.95 sec      FIFMax-Pre   Date Value Ref Range Status   01/12/2015 3.45 L/sec      FEV1FEV6-Pred   Date Value Ref Range Status   01/12/2015 81 %      FEV1FEV6-Pre   Date Value Ref Range Status   01/12/2015 80 %        Sanya Mccurdy PA-C 2/14/2023

## 2023-02-14 NOTE — NURSING NOTE
Is the patient currently in the state of [place]? YES    Visit mode:VIDEO    If the visit is dropped, the patient can be reconnected by: VIDEO VISIT: Send to e-mail at: [email or web address]    Will anyone else be joining the visit? NO      How would you like to obtain your AVS? MyChart    Are changes needed to the allergy or medication list? NO    Comments or concerns regarding today's visit: cuong Pozo

## 2023-02-17 NOTE — NURSING NOTE
Return in about 2 year reminder created and to be send via [facility].       Shalini Engel YELENA  [facility]

## 2023-03-14 ENCOUNTER — TELEPHONE (OUTPATIENT)
Dept: SLEEP MEDICINE | Facility: CLINIC | Age: 53
End: 2023-03-14

## 2023-03-14 NOTE — TELEPHONE ENCOUNTER
There was a message from patient on [facility] sleep [facility]. It was regarding patient's machine.     Writer called and left message for patient to call back.     Upon call back, please ask what we can assist patient with.

## 2023-06-28 ENCOUNTER — MYC MEDICAL ADVICE (OUTPATIENT)
Dept: FAMILY MEDICINE | Facility: CLINIC | Age: 53
End: 2023-06-28
Payer: COMMERCIAL

## 2023-06-29 ENCOUNTER — VIRTUAL VISIT (OUTPATIENT)
Dept: FAMILY MEDICINE | Facility: CLINIC | Age: 53
End: 2023-06-29
Payer: COMMERCIAL

## 2023-06-29 DIAGNOSIS — M25.562 ACUTE PAIN OF LEFT KNEE: Primary | ICD-10-CM

## 2023-06-29 PROCEDURE — 99213 OFFICE O/P EST LOW 20 MIN: CPT | Mod: VID | Performed by: PHYSICIAN ASSISTANT

## 2023-06-29 ASSESSMENT — PATIENT HEALTH QUESTIONNAIRE - PHQ9
10. IF YOU CHECKED OFF ANY PROBLEMS, HOW DIFFICULT HAVE THESE PROBLEMS MADE IT FOR YOU TO DO YOUR WORK, TAKE CARE OF THINGS AT HOME, OR GET ALONG WITH OTHER PEOPLE: NOT DIFFICULT AT ALL
SUM OF ALL RESPONSES TO PHQ QUESTIONS 1-9: 0
SUM OF ALL RESPONSES TO PHQ QUESTIONS 1-9: 0

## 2023-06-29 NOTE — TELEPHONE ENCOUNTER
DIAGNOSIS: Acute pain of left knee    APPOINTMENT DATE: 07/20/2023   NOTES STATUS DETAILS   OFFICE NOTE from referring provider Internal 06/29/2023 Dr Lovell [facility]    OFFICE NOTE from other specialist N/A    DISCHARGE SUMMARY from hospital N/A    DISCHARGE REPORT from the ER N/A    OPERATIVE REPORT N/A    EMG report N/A    MEDICATION LIST N/A    MRI N/A    DEXA (osteoporosis/bone health) N/A    CT SCAN N/A    XRAYS (IMAGES & REPORTS) N/A

## 2023-06-29 NOTE — PATIENT INSTRUCTIONS
Schedule an xray of the knee at any [facility] clinic   Take ibuprofen 3 over the counter tablets four times a day with food and follow up with sports medicine ortho at [facility] ([phone]) formerly called [facility].    Return urgently if any change in symptoms.

## 2023-06-29 NOTE — PROGRESS NOTES
"Khoa is a 53 year old who is being evaluated via a billable video visit.      How would you like to obtain your AVS? MyChart  If the video visit is dropped, the invitation should be resent by: Send to e-mail at: [email or web address]  Will anyone else be joining your video visit? No          Assessment & Plan     Acute pain of left knee  Schedule xray and follow up with sports ortho.  Recommended nsaid -Return urgently if any change in symptoms.    - XR Knee Left 3 Views  - Orthopedic  Referral      Ordering of each unique test         BMI:   Estimated body mass index is 29.27 kg/m  as calculated from the following:    Height as of 2/14/23: 1.778 m (5' 10\").    Weight as of 2/14/23: 92.5 kg (204 lb).   Weight management plan: Discussed healthy diet and exercise guidelines    Patient Instructions   Schedule an xray of the knee at any [facility] clinic   Take ibuprofen 3 over the counter tablets four times a day with food and follow up with sports medicine ortho at [facility] ([phone]) formerly called [facility].    Return urgently if any change in symptoms.        UNA Agarwal [facility]    Tessa Couch is a 53 year old, presenting for the following health issues:  Knee Pain        6/29/2023     7:58 AM   Additional Questions   Roomed by Lazara     History of Present Illness       Reason for visit:  Knee pain     Today's PHQ-9         PHQ-9 Total Score: 0    PHQ-9 Q9 Thoughts of better off dead/self-harm past 2 weeks :   Not at all    How difficult have these problems made it for you to do your work, take care of things at home, or get along with other people: Not difficult at all       Pain History:  When did you first notice your pain? About one month   Have you seen anyone else for your pain? No  How has your pain affected your ability to work? 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 Musculoskeletal problem/pain  Onset/Duration: two months  Description  Location: knee - left  Joint Swelling: No  Redness: No  Pain: YES  Warmth: No  Intensity:  mild  Progression of Symptoms:  same and intermittent  Accompanying signs and symptoms:   Fevers: No  Numbness/tingling/weakness: No  History  Trauma to the area: No  Recent illness:  No  Previous similar problem: YES  Previous evaluation:  No  Precipitating or alleviating factors:  Aggravating factors include: climbing stairs and exercise  Therapies tried and outcome: nothing        Right now left knee- ongoing for 6 months- worse depending on certain exercises at gym.  Eliptical, free weights, Exercise bike or certain squats-not massive heavy weights   Work for [employer]. Works in Crisis   Pain is Not constant.  2-3 weeks ago sitting at table and moved my leg and felt like something snapped and hurt- if stand on it or walk or up and down stairs pain increased     Review of Systems   Constitutional, HEENT, cardiovascular, pulmonary, gi and gu systems are negative, except as otherwise noted.      Objective           Vitals:  No vitals were obtained today due to virtual visit.    Physical Exam   GENERAL: Healthy, alert and no distress  EYES: Eyes grossly normal to inspection.  No discharge or erythema, or obvious scleral/conjunctival abnormalities.  RESP: No audible wheeze, cough, or visible cyanosis.  No visible retractions or increased work of breathing.    SKIN: Visible skin clear. No significant rash, abnormal pigmentation or lesions.  NEURO: Cranial nerves grossly intact.  Mentation and speech appropriate for age.  PSYCH: Mentation appears normal, affect normal/bright, judgement and insight intact, normal speech and appearance well-groomed.                Video-Visit Details    Type of service:  Video Visit     Originating Location (pt. Location): Home    Distant Location (provider  location):  Off-site  Platform used for Video Visit: [facility]    Patient answers are not available for this visit.

## 2023-07-20 ENCOUNTER — ANCILLARY PROCEDURE (OUTPATIENT)
Dept: GENERAL RADIOLOGY | Facility: CLINIC | Age: 53
End: 2023-07-20
Attending: FAMILY MEDICINE
Payer: COMMERCIAL

## 2023-07-20 ENCOUNTER — PRE VISIT (OUTPATIENT)
Dept: ORTHOPEDICS | Facility: CLINIC | Age: 53
End: 2023-07-20

## 2023-07-20 ENCOUNTER — OFFICE VISIT (OUTPATIENT)
Dept: ORTHOPEDICS | Facility: CLINIC | Age: 53
End: 2023-07-20
Attending: PHYSICIAN ASSISTANT
Payer: COMMERCIAL

## 2023-07-20 ENCOUNTER — ANCILLARY PROCEDURE (OUTPATIENT)
Dept: GENERAL RADIOLOGY | Facility: CLINIC | Age: 53
End: 2023-07-20
Attending: PHYSICIAN ASSISTANT
Payer: COMMERCIAL

## 2023-07-20 DIAGNOSIS — G89.29 CHRONIC NECK PAIN: ICD-10-CM

## 2023-07-20 DIAGNOSIS — M54.2 CHRONIC NECK PAIN: ICD-10-CM

## 2023-07-20 DIAGNOSIS — M54.2 CHRONIC NECK PAIN: Primary | ICD-10-CM

## 2023-07-20 DIAGNOSIS — G89.29 CHRONIC NECK PAIN: Primary | ICD-10-CM

## 2023-07-20 DIAGNOSIS — M25.562 ACUTE PAIN OF LEFT KNEE: ICD-10-CM

## 2023-07-20 PROCEDURE — 72040 X-RAY EXAM NECK SPINE 2-3 VW: CPT | Mod: GC | Performed by: RADIOLOGY

## 2023-07-20 PROCEDURE — 99214 OFFICE O/P EST MOD 30 MIN: CPT | Performed by: FAMILY MEDICINE

## 2023-07-20 PROCEDURE — 73562 X-RAY EXAM OF KNEE 3: CPT | Mod: LT | Performed by: RADIOLOGY

## 2023-07-20 ASSESSMENT — PAIN SCALES - GENERAL: PAINLEVEL: NO PAIN (0)

## 2023-07-20 NOTE — NURSING NOTE
Chief Complaint   Patient presents with     Left Knee - Pain, New Patient     Left knee pops out       There were no vitals filed for this visit.    There is no height or weight on file to calculate BMI.      YOVANI Locke NREMT                    \

## 2023-07-20 NOTE — PROGRESS NOTES
CHIEF COMPLAINT:  Pain and New Patient of the Left Knee (Left knee pops out)       HISTORY OF PRESENT ILLNESS  Mr. Contreras is a pleasant 53 year old male who presents to clinic today with left knee pain.  Zane         Additional history: as documented    MEDICAL HISTORY  Patient Active Problem List   Diagnosis    ADD (attention deficit disorder)    HARLAN (obstructive sleep apnea)    Hyperlipidemia LDL goal <160    Generalized anxiety disorder    Benign skin lesion of face    Dysthymia    Allergic rhinitis    Abnormal CXR perihilar nodes on CT chest    Palpitations    Mediastinal lymphadenopathy    Cervical myofascial strain, sequela       Current Outpatient Medications   Medication Sig Dispense Refill    amphetamine-dextroamphetamine (ADDERALL XR) 15 MG per 24 hr capsule TAKE ONE CAPSULE BY MOUTH EVERY MORNING PLEASE MAKE AN APPOINTMENT FOR FUTURE REFILLS  0    baclofen (LIORESAL) 10 MG tablet TAKE 1 TABLET(10 MG) BY MOUTH THREE TIMES DAILY AS NEEDED FOR MUSCLE SPASMS 90 tablet 3    cetirizine (ZYRTEC) 10 MG tablet Take 1 tablet (10 mg) by mouth At Bedtime 30 tablet 11    venlafaxine (EFFEXOR XR) 150 MG 24 hr capsule Take 150 mg by mouth daily         No Known Allergies    Family History   Problem Relation Age of Onset    C.A.D. Maternal Grandfather     Alcohol/Drug Maternal Grandfather          from alcoholism    Cerebrovascular Disease Maternal Grandfather     Lipids Father     Cancer Maternal Grandmother         Pancreatic    Cancer Paternal Grandmother         Lung, may not have been primary    Heart Disease Paternal Grandfather     Anxiety Disorder Sister     Anxiety Disorder Sister     LUNG DISEASE Maternal Uncle         Asthma versus sarcoid    Asthma No family hx of     Diabetes No family hx of     Hypertension No family hx of     Breast Cancer No family hx of     Cancer - colorectal No family hx of     Prostate Cancer No family hx of     Connective Tissue Disorder No family hx of     Thyroid Disease No  family hx of        Additional medical/Social/Surgical histories reviewed in EPIC and updated as appropriate.        PHYSICAL EXAM  General  - normal appearance, in no obvious distress  Musculoskeletal - left knee  - stance: mildly antalgic gait  - inspection: trace effusion  - palpation: medial joint line tenderness  - ROM: 120 degrees flexion, 0 degrees extension, painful active ROM  - strength: 5/5 in flexion, 5/5 in extension  - special tests:  (-) Marium  (-) varus at 0 and 30 degrees flexion  (-) valgus at 0 and 30 degrees flexion    Musculoskeletal - cervical spine  - inspection: normal bone and joint alignment, no obvious kyphosis  - palpation: no paravertebral or bony tenderness  - ROM: grinding sensation with bilateral rotation  - strength: upper extremities 5/5 in all planes  Neuro  - C5-7 DTRs 2+ bilaterally, no sensory or motor deficit, grossly normal coordination, normal muscle tone               ASSESSMENT & PLAN  Mr. Contreras is a 53 year old male who presents to clinic today with left knee pain.    I ordered and independently reviewed an xray of his left knee, this reveals minimal osteoarthritis of the left knee.  I'm referring him to PT, I do think he'll do well with focused treatment.  If he's not improving I'd likely consider an MRI.    With regards to his cervical spine I ordered and independently reviewed an x-ray, this shows osteoarthritic changes C6-7 and straightening of the normal lordotic curve.  I do think that he will do well with physical therapy for his cervical spine as well, however, if he does not improve whatsoever over the coming weeks I may consider MR imaging of this in addition.    It was a pleasure seeing Conor today.    Favian Pozo DO, [facility]  Primary Care Sports Medicine      This note was constructed using Dragon dictation software, please excuse any minor errors in spelling, grammar, or syntax.

## 2023-07-20 NOTE — RESULT ENCOUNTER NOTE
Dear Khoa  Your knee xray was normal.  Please call or MyChart my office with any questions or concerns.   Itzel Lovell, PAC

## 2023-07-20 NOTE — LETTER
2023         RE: Conor Contreras  [address] [address]        Dear Colleague,    Thank you for referring your patient, Conor Contreras, to the [facility] [place]. Please see a copy of my visit note below.    CHIEF COMPLAINT:  Pain and New Patient of the Left Knee (Left knee pops out)       HISTORY OF PRESENT ILLNESS  Mr. Contreras is a pleasant 53 year old male who presents to clinic today with left knee pain.  Zane         Additional history: as documented    MEDICAL HISTORY  Patient Active Problem List   Diagnosis     ADD (attention deficit disorder)     HARLAN (obstructive sleep apnea)     Hyperlipidemia LDL goal <160     Generalized anxiety disorder     Benign skin lesion of face     Dysthymia     Allergic rhinitis     Abnormal CXR perihilar nodes on CT chest     Palpitations     Mediastinal lymphadenopathy     Cervical myofascial strain, sequela       Current Outpatient Medications   Medication Sig Dispense Refill     amphetamine-dextroamphetamine (ADDERALL XR) 15 MG per 24 hr capsule TAKE ONE CAPSULE BY MOUTH EVERY MORNING PLEASE MAKE AN APPOINTMENT FOR FUTURE REFILLS  0     baclofen (LIORESAL) 10 MG tablet TAKE 1 TABLET(10 MG) BY MOUTH THREE TIMES DAILY AS NEEDED FOR MUSCLE SPASMS 90 tablet 3     cetirizine (ZYRTEC) 10 MG tablet Take 1 tablet (10 mg) by mouth At Bedtime 30 tablet 11     venlafaxine (EFFEXOR XR) 150 MG 24 hr capsule Take 150 mg by mouth daily         No Known Allergies    Family History   Problem Relation Age of Onset     C.A.D. Maternal Grandfather      Alcohol/Drug Maternal Grandfather          from alcoholism     Cerebrovascular Disease Maternal Grandfather      Lipids Father      Cancer Maternal Grandmother         Pancreatic     Cancer Paternal Grandmother         Lung, may not have been primary     Heart Disease Paternal Grandfather      Anxiety Disorder Sister      Anxiety Disorder Sister      LUNG DISEASE Maternal Uncle          Asthma versus sarcoid     Asthma No family hx of      Diabetes No family hx of      Hypertension No family hx of      Breast Cancer No family hx of      Cancer - colorectal No family hx of      Prostate Cancer No family hx of      Connective Tissue Disorder No family hx of      Thyroid Disease No family hx of        Additional medical/Social/Surgical histories reviewed in EPIC and updated as appropriate.        PHYSICAL EXAM  General  - normal appearance, in no obvious distress  Musculoskeletal - left knee  - stance: mildly antalgic gait  - inspection: trace effusion  - palpation: medial joint line tenderness  - ROM: 120 degrees flexion, 0 degrees extension, painful active ROM  - strength: 5/5 in flexion, 5/5 in extension  - special tests:  (-) Marium  (-) varus at 0 and 30 degrees flexion  (-) valgus at 0 and 30 degrees flexion    Musculoskeletal - cervical spine  - inspection: normal bone and joint alignment, no obvious kyphosis  - palpation: no paravertebral or bony tenderness  - ROM: grinding sensation with bilateral rotation  - strength: upper extremities 5/5 in all planes  Neuro  - C5-7 DTRs 2+ bilaterally, no sensory or motor deficit, grossly normal coordination, normal muscle tone               ASSESSMENT & PLAN  Mr. Contreras is a 53 year old male who presents to clinic today with left knee pain.    I ordered and independently reviewed an xray of his left knee, this reveals minimal osteoarthritis of the left knee.  I'm referring him to PT, I do think he'll do well with focused treatment.  If he's not improving I'd likely consider an MRI.    With regards to his cervical spine I ordered and independently reviewed an x-ray, this shows osteoarthritic changes C6-7 and straightening of the normal lordotic curve.  I do think that he will do well with physical therapy for his cervical spine as well, however, if he does not improve whatsoever over the coming weeks I may consider MR imaging of this in  addition.    It was a pleasure seeing Conor today.    Favian Pozo DO, [facility]  Primary Care Sports Medicine      This note was constructed using Dragon dictation software, please excuse any minor errors in spelling, grammar, or syntax.      Again, thank you for allowing me to participate in the care of your patient.        Sincerely,        Favian Pozo DO

## 2023-08-15 ENCOUNTER — THERAPY VISIT (OUTPATIENT)
Dept: PHYSICAL THERAPY | Facility: CLINIC | Age: 53
End: 2023-08-15
Payer: COMMERCIAL

## 2023-08-15 DIAGNOSIS — M25.562 CHRONIC PAIN OF LEFT KNEE: Primary | ICD-10-CM

## 2023-08-15 DIAGNOSIS — M25.562 ACUTE PAIN OF LEFT KNEE: ICD-10-CM

## 2023-08-15 DIAGNOSIS — G89.29 CHRONIC NECK PAIN: ICD-10-CM

## 2023-08-15 DIAGNOSIS — M54.2 CHRONIC NECK PAIN: ICD-10-CM

## 2023-08-15 DIAGNOSIS — G89.29 CHRONIC PAIN OF LEFT KNEE: Primary | ICD-10-CM

## 2023-08-15 PROCEDURE — 97110 THERAPEUTIC EXERCISES: CPT | Mod: GP

## 2023-08-15 PROCEDURE — 97161 PT EVAL LOW COMPLEX 20 MIN: CPT | Mod: GP

## 2023-08-15 ASSESSMENT — ACTIVITIES OF DAILY LIVING (ADL)
KNEEL ON THE FRONT OF YOUR KNEE: ACTIVITY IS FAIRLY DIFFICULT
AS_A_RESULT_OF_YOUR_KNEE_INJURY,_HOW_WOULD_YOU_RATE_YOUR_CURRENT_LEVEL_OF_DAILY_ACTIVITY?: NEARLY NORMAL
HOW_WOULD_YOU_RATE_THE_OVERALL_FUNCTION_OF_YOUR_KNEE_DURING_YOUR_USUAL_DAILY_ACTIVITIES?: NEARLY NORMAL
PAIN: THE SYMPTOM AFFECTS MY ACTIVITY SLIGHTLY
SQUAT: ACTIVITY IS SOMEWHAT DIFFICULT
GO UP STAIRS: ACTIVITY IS FAIRLY DIFFICULT
LIMPING: I HAVE THE SYMPTOM BUT IT DOES NOT AFFECT MY ACTIVITY
GIVING WAY, BUCKLING OR SHIFTING OF KNEE: THE SYMPTOM AFFECTS MY ACTIVITY SLIGHTLY
GIVING WAY, BUCKLING OR SHIFTING OF KNEE: I HAVE THE SYMPTOM BUT IT DOES NOT AFFECT MY ACTIVITY
SQUAT: ACTIVITY IS FAIRLY DIFFICULT
WEAKNESS: THE SYMPTOM AFFECTS MY ACTIVITY SLIGHTLY
STAND: ACTIVITY IS MINIMALLY DIFFICULT
STIFFNESS: I DO NOT HAVE THE SYMPTOM
WALK: ACTIVITY IS MINIMALLY DIFFICULT
RAW_SCORE: 45
KNEEL ON THE FRONT OF YOUR KNEE: ACTIVITY IS SOMEWHAT DIFFICULT
RISE FROM A CHAIR: ACTIVITY IS SOMEWHAT DIFFICULT
KNEE_ACTIVITY_OF_DAILY_LIVING_SUM: 45
SIT WITH YOUR KNEE BENT: ACTIVITY IS NOT DIFFICULT
WEAKNESS: THE SYMPTOM AFFECTS MY ACTIVITY SLIGHTLY
AS_A_RESULT_OF_YOUR_KNEE_INJURY,_HOW_WOULD_YOU_RATE_YOUR_CURRENT_LEVEL_OF_DAILY_ACTIVITY?: ABNORMAL
RISE FROM A CHAIR: ACTIVITY IS SOMEWHAT DIFFICULT
LIMPING: I HAVE THE SYMPTOM BUT IT DOES NOT AFFECT MY ACTIVITY
GO DOWN STAIRS: ACTIVITY IS FAIRLY DIFFICULT
RAW_SCORE: 52
HOW_WOULD_YOU_RATE_THE_CURRENT_FUNCTION_OF_YOUR_KNEE_DURING_YOUR_USUAL_DAILY_ACTIVITIES_ON_A_SCALE_FROM_0_TO_100_WITH_100_BEING_YOUR_LEVEL_OF_KNEE_FUNCTION_PRIOR_TO_YOUR_INJURY_AND_0_BEING_THE_INABILITY_TO_PERFORM_ANY_OF_YOUR_USUAL_DAILY_ACTIVITIES?: 50
HOW_WOULD_YOU_RATE_THE_CURRENT_FUNCTION_OF_YOUR_KNEE_DURING_YOUR_USUAL_DAILY_ACTIVITIES_ON_A_SCALE_FROM_0_TO_100_WITH_100_BEING_YOUR_LEVEL_OF_KNEE_FUNCTION_PRIOR_TO_YOUR_INJURY_AND_0_BEING_THE_INABILITY_TO_PERFORM_ANY_OF_YOUR_USUAL_DAILY_ACTIVITIES?: 40
STIFFNESS: I DO NOT HAVE THE SYMPTOM
KNEE_ACTIVITY_OF_DAILY_LIVING_SCORE: 74.29
SIT WITH YOUR KNEE BENT: ACTIVITY IS NOT DIFFICULT
HOW_WOULD_YOU_RATE_THE_OVERALL_FUNCTION_OF_YOUR_KNEE_DURING_YOUR_USUAL_DAILY_ACTIVITIES?: ABNORMAL
WALK: ACTIVITY IS MINIMALLY DIFFICULT
GO UP STAIRS: ACTIVITY IS SOMEWHAT DIFFICULT
KNEE_ACTIVITY_OF_DAILY_LIVING_SCORE: 64.29
SWELLING: I DO NOT HAVE THE SYMPTOM
GO DOWN STAIRS: ACTIVITY IS VERY DIFFICULT
KNEE_ACTIVITY_OF_DAILY_LIVING_SUM: 52
SWELLING: I DO NOT HAVE THE SYMPTOM
PAIN: THE SYMPTOM AFFECTS MY ACTIVITY MODERATELY
STAND: ACTIVITY IS NOT DIFFICULT

## 2023-08-15 NOTE — PROGRESS NOTES
PHYSICAL THERAPY EVALUATION  Type of Visit: Evaluation    See electronic medical record for Abuse and Falls Screening details.    Subjective       Presenting condition or subjective complaint: Knee pain and difficulty going up and down stairs. Difficulty with exercising legs.  Date of onset: 23    Relevant medical history: Sleep disorder like apnea   Dates & types of surgery:      Prior diagnostic imaging/testing results: X-ray     Prior therapy history for the same diagnosis, illness or injury: No      Prior Level of Function  Transfers:   Ambulation:   ADL:   IADL:     Living Environment  Social support: With a significant other or spouse   Type of home: House; 1 level; Basement   Stairs to enter the home: Yes 15 Is there a railing: Yes   Ramp: No   Stairs inside the home: Yes 15 Is there a railing: Yes   Help at home: None  Equipment owned:       Employment: Yes Counselor/therapist  Hobbies/Interests: hiking, biking, fitness    Subjective Summary: He reports having ongoing neck pain which has always been going on, but currently has left knee pain that started this year.  He reports the pain is kind of hard to describe. He says he was sitting at a table and something popped in his knee resulting in knee pain that bothers him.  It impacts him going up and down stairs.  He also says pressure on it like standing up out of a chair bother it.  Leg exercises also seem to bother it (mainly squats).  He also wonders if it's due to sitting [place] style making his knee pain worse, so he has tried to stop doing that.     Patient goals for therapy: Leg exercises. Difficulty going up and down stairs.    Pain assessment: Location: Left Knee Pain /Ratin/10 at it's worse     Objective   KNEE EVALUATION  PAIN: Pain Level at Rest: 0/10  Pain Level with Use: 5/10  Pain is Exacerbated By: Stairs, Squatting, Working Out  Pain is Relieved By: none  Pain Progression: Unchanged  INTEGUMENTARY (edema, incisions):   POSTURE:    GAIT:  Weightbearing Status:   Assistive Device(s):   Gait Deviations: WNL  BALANCE/PROPRIOCEPTION:   WEIGHTBEARING ALIGNMENT:   NON-WEIGHTBEARING ALIGNMENT:   ROM:  Left Knee AROM: 0 - 140.  PROM: 0-145    STRENGTH: WNL  FLEXIBILITY:   SPECIAL TESTS: WNL  Stability tests negative (Varus/Valgus), Meniscus Tests Negative (Marium's), Patellar Compression test Positive for cavitation  FUNCTIONAL TESTS: Double Leg Squat: Knee valgus and Able to perform full deep squat with pain  Single Leg Squat: Knee valgus, Quadriceps avoidance squatting pattern, and Able to perform full deep squat with pain  PALPATION:  No pain with palpation  JOINT MOBILITY:  Reproduced discomfort with medial and lateral glides of the patella    Assessment & Plan   CLINICAL IMPRESSIONS  Medical Diagnosis: Chronic Left knee Pain    Treatment Diagnosis: Chronic Left Knee Pain   Impression/Assessment: Patient is a 53 year old male with Left Knee Pain complaints.  The following significant findings have been identified: Pain, Decreased ROM/flexibility, Decreased strength, and Impaired muscle performance. These impairments interfere with their ability to perform self care tasks, work tasks, recreational activities, household chores, driving , household mobility, and community mobility as compared to previous level of function.     Clinical Decision Making (Complexity):  Clinical Presentation: Stable/Uncomplicated  Clinical Presentation Rationale: based on medical and personal factors listed in PT evaluation  Clinical Decision Making (Complexity): Low complexity    PLAN OF CARE  Treatment Interventions:  Interventions: Manual Therapy, Neuromuscular Re-education, Therapeutic Activity, Therapeutic Exercise    Long Term Goals     PT Goal 1  Goal Identifier: Squatting  Goal Description: Patient will be able to stand up / sit down from a chair, ascend/descend 1 flight of stairs and perform squats both at home and at the gym without left knee pain or  "\"pressure\"  Rationale: to maximize safety and independence with performance of ADLs and functional tasks;to maximize safety and independence within the community;to maximize safety and independence within the home;to maximize safety and independence with transportation;to maximize safety and independence with self cares  Goal Progress: Patient feels pressure and pain in the left knee with squatting, standing, sitting and transfers  Target Date: 09/26/23      Frequency of Treatment: 1x a week  Duration of Treatment: 6 weeks    Recommended Referrals to Other Professionals: Physical Therapy  Education Assessment:        Risks and benefits of evaluation/treatment have been explained.   Patient/Family/caregiver agrees with Plan of Care.     Evaluation Time:     PT Eval, Low Complexity Minutes (50125): 19       Signing Clinician: Km Babin PT          "

## 2023-08-23 ENCOUNTER — THERAPY VISIT (OUTPATIENT)
Dept: PHYSICAL THERAPY | Facility: CLINIC | Age: 53
End: 2023-08-23
Payer: COMMERCIAL

## 2023-08-23 DIAGNOSIS — M25.562 CHRONIC PAIN OF LEFT KNEE: Primary | ICD-10-CM

## 2023-08-23 DIAGNOSIS — G89.29 CHRONIC PAIN OF LEFT KNEE: Primary | ICD-10-CM

## 2023-08-23 PROCEDURE — 97110 THERAPEUTIC EXERCISES: CPT | Mod: GP

## 2023-09-12 ENCOUNTER — PATIENT OUTREACH (OUTPATIENT)
Dept: CARE COORDINATION | Facility: CLINIC | Age: 53
End: 2023-09-12
Payer: COMMERCIAL

## 2023-09-26 ENCOUNTER — PATIENT OUTREACH (OUTPATIENT)
Dept: CARE COORDINATION | Facility: CLINIC | Age: 53
End: 2023-09-26
Payer: COMMERCIAL

## 2023-10-13 NOTE — PROGRESS NOTES
"   08/23/23 0500   Appointment Info   Signing clinician's name / credentials Km Babin, PT, DPT, CSCS, CLT   Total/Authorized Visits [employer]   Visits Used 2   Medical Diagnosis Chronic Left knee Pain   PT Tx Diagnosis Chronic Left Knee Pain   Progress Note/Certification   Onset of illness/injury or Date of Surgery 07/20/23   Therapy Frequency 1x a week   Predicted Duration 6 weeks   Progress Note Due Date 09/26/23   Progress Note Completed Date 08/15/23   PT Goal 1   Goal Identifier Squatting   Goal Description Patient will be able to stand up / sit down from a chair, ascend/descend 1 flight of stairs and perform squats both at home and at the gym without left knee pain or \"pressure\"   Rationale to maximize safety and independence with performance of ADLs and functional tasks;to maximize safety and independence within the community;to maximize safety and independence within the home;to maximize safety and independence with transportation;to maximize safety and independence with self cares   Goal Progress Patient feels pressure and pain in the left knee with squatting, standing, sitting and transfers   Target Date 09/26/23   Subjective Report   Subjective Report Reports his left knee pain has actually felt worse especially with stairs, chair transfers and squats.   Objective Measures   Objective Measures Objective Measure 1;Objective Measure 2;Objective Measure 3   Objective Measure 3   Objective Measure Pain on Arrival   Details Reports no pain on arrival or after therapy exercises   Treatment Interventions (PT)   Interventions Therapeutic Procedure/Exercise   Therapeutic Procedure/Exercise   Therapeutic Procedures: strength, endurance, ROM, flexibillity minutes (40515) 30   PTRx Ther Proc 1 Hip Flexion Straight Leg Raise   PTRx Ther Proc 1 - Details 2x18   PTRx Ther Proc 2 Hip Abduction Straight Leg Raise   PTRx Ther Proc 2 - Details 2x18   PTRx Ther Proc 3 Functional Knee Extension with Tubing   PTRx Ther Proc 3 - " Details 2x18 with GTB and 5 second holds   Skilled Intervention Open Chain quad work   Patient Response/Progress Tolerated well. Fatigued easy   PTRx Ther Proc 4 Sit to Stand   PTRx Ther Proc 4 - Details 3x10 with GTB around knees   Ther Proc 1 Bike for warm up   Ther Proc 1 - Details x5 minutes at level 11 resistance   Plan   Home program See PTRx   Updates to plan of care Continue POC   Plan for next session Next session repeat HEP then just try squatting with TB around knees and Prydeinig squats with TB. Focus is on quad recruitment first then transition to painfree squatting/functional work.  Last session didn't try because had knee pain with transfers, stairs and squats this past week.   Total Session Time   Timed Code Treatment Minutes 30   Total Treatment Time (sum of timed and untimed services) 30           DISCHARGE  Reason for Discharge: Patient chooses to discontinue therapy.    Equipment Issued: None    Discharge Plan: Patient to continue home program.    Referring Provider:  Favian Pozo

## 2023-11-22 ENCOUNTER — TELEPHONE (OUTPATIENT)
Dept: FAMILY MEDICINE | Facility: CLINIC | Age: 53
End: 2023-11-22
Payer: COMMERCIAL

## 2023-11-22 NOTE — TELEPHONE ENCOUNTER
Patient Quality Outreach    Patient is due for the following:   Physical Preventive Adult Physical      Topic Date Due    Hepatitis B Vaccine (1 of 3 - 3-dose series) Never done    Hepatitis A Vaccine (1 of 2 - Risk 2-dose series) Never done    Zoster (Shingles) Vaccine (2 of 2) 12/23/2020       Next Steps:   Schedule a Adult Preventative    Type of outreach:    Sent [employer] message.      Questions for provider review:    None           Carie Guerra MA

## 2023-12-03 ENCOUNTER — HEALTH MAINTENANCE LETTER (OUTPATIENT)
Age: 53
End: 2023-12-03

## 2023-12-05 ENCOUNTER — OFFICE VISIT (OUTPATIENT)
Dept: FAMILY MEDICINE | Facility: CLINIC | Age: 53
End: 2023-12-05
Payer: COMMERCIAL

## 2023-12-05 VITALS
DIASTOLIC BLOOD PRESSURE: 85 MMHG | SYSTOLIC BLOOD PRESSURE: 127 MMHG | TEMPERATURE: 97.9 F | BODY MASS INDEX: 28.69 KG/M2 | WEIGHT: 200.4 LBS | RESPIRATION RATE: 24 BRPM | HEART RATE: 79 BPM | OXYGEN SATURATION: 98 % | HEIGHT: 70 IN

## 2023-12-05 DIAGNOSIS — J84.9 ILD (INTERSTITIAL LUNG DISEASE) (H): ICD-10-CM

## 2023-12-05 DIAGNOSIS — Z12.11 SCREEN FOR COLON CANCER: ICD-10-CM

## 2023-12-05 DIAGNOSIS — Z00.00 ROUTINE GENERAL MEDICAL EXAMINATION AT A HEALTH CARE FACILITY: Primary | ICD-10-CM

## 2023-12-05 DIAGNOSIS — R91.8 PULMONARY NODULES: ICD-10-CM

## 2023-12-05 DIAGNOSIS — E78.5 DYSLIPIDEMIA: ICD-10-CM

## 2023-12-05 DIAGNOSIS — Z12.5 SCREENING FOR PROSTATE CANCER: ICD-10-CM

## 2023-12-05 PROCEDURE — 99213 OFFICE O/P EST LOW 20 MIN: CPT | Mod: 25 | Performed by: PHYSICIAN ASSISTANT

## 2023-12-05 PROCEDURE — 90746 HEPB VACCINE 3 DOSE ADULT IM: CPT | Performed by: PHYSICIAN ASSISTANT

## 2023-12-05 PROCEDURE — 90472 IMMUNIZATION ADMIN EACH ADD: CPT | Performed by: PHYSICIAN ASSISTANT

## 2023-12-05 PROCEDURE — 90471 IMMUNIZATION ADMIN: CPT | Performed by: PHYSICIAN ASSISTANT

## 2023-12-05 PROCEDURE — 99396 PREV VISIT EST AGE 40-64: CPT | Mod: 25 | Performed by: PHYSICIAN ASSISTANT

## 2023-12-05 PROCEDURE — 90632 HEPA VACCINE ADULT IM: CPT | Performed by: PHYSICIAN ASSISTANT

## 2023-12-05 ASSESSMENT — PATIENT HEALTH QUESTIONNAIRE - PHQ9
10. IF YOU CHECKED OFF ANY PROBLEMS, HOW DIFFICULT HAVE THESE PROBLEMS MADE IT FOR YOU TO DO YOUR WORK, TAKE CARE OF THINGS AT HOME, OR GET ALONG WITH OTHER PEOPLE: NOT DIFFICULT AT ALL
SUM OF ALL RESPONSES TO PHQ QUESTIONS 1-9: 1
SUM OF ALL RESPONSES TO PHQ QUESTIONS 1-9: 1

## 2023-12-05 ASSESSMENT — ENCOUNTER SYMPTOMS
COUGH: 0
DYSURIA: 0
FEVER: 0
WEAKNESS: 0
HEADACHES: 0
SHORTNESS OF BREATH: 0
PALPITATIONS: 0
EYE PAIN: 0
CHILLS: 0
ABDOMINAL PAIN: 0
DIZZINESS: 0
ARTHRALGIAS: 0
SORE THROAT: 0
NERVOUS/ANXIOUS: 0
PARESTHESIAS: 0
DIARRHEA: 0
CONSTIPATION: 0
FREQUENCY: 1
HEMATURIA: 0
HEARTBURN: 0
JOINT SWELLING: 0
MYALGIAS: 0
HEMATOCHEZIA: 0
NAUSEA: 0

## 2023-12-05 NOTE — PROGRESS NOTES
SUBJECTIVE:   Khoa is a 53 year old, presenting for the following:  Physical        12/5/2023     1:24 PM   Additional Questions   Roomed by Arren     Here for an annual wellness visit. He has noticed increase in urinary frequency, and taking a longer time to empty his bladder. No urgency, blood in urine. His grandfather diagnosed with prostate cancer in his 80's.     Has a history of pulmonary lung nodules. He is wondering about a follow up CT scan. Notices a slight increase with dyspnea on exertion while at the gym. Otherwise stable.      Denies palpitations, chest pain or light headedness. Would like routine lab work performed today. Has been working on a healthier diet by cutting down on alcoholic beverages during the week and cooking at home. Goes to the gym for half an hour 4-5 times per week.       Healthy Habits:     Getting at least 3 servings of Calcium per day:  Yes    Bi-annual eye exam:  Yes    Dental care twice a year:  Yes    Sleep apnea or symptoms of sleep apnea:  Sleep apnea    Diet:  Regular (no restrictions)    Frequency of exercise:  2-3 days/week    Duration of exercise:  15-30 minutes    Taking medications regularly:  Yes    Medication side effects:  None    Additional concerns today:  Yes      Today's PHQ-9 Score:       12/5/2023     6:15 AM   PHQ-9 SCORE   PHQ-9 Total Score MyChart 1 (Minimal depression)   PHQ-9 Total Score 1       The 10-year ASCVD risk score (Guillermo MAYS, et al., 2019) is: 6.2%    Values used to calculate the score:      Age: 53 years      Sex: Male      Is Non- : No      Diabetic: No      Tobacco smoker: No      Systolic Blood Pressure: 127 mmHg      Is BP treated: No      HDL Cholesterol: 45 mg/dL      Total Cholesterol: 221 mg/dL       Social History     Tobacco Use    Smoking status: Former     Packs/day: 0.50     Years: 10.00     Additional pack years: 0.00     Total pack years: 5.00     Types: Cigarettes     Quit date: 3/18/2001     Years  since quittin.7    Smokeless tobacco: Never    Tobacco comments:     QUIT 9 YEARS AGO   Substance Use Topics    Alcohol use: Yes     Comment: 6 to 8 beers per week             2023     6:21 AM   Alcohol Use   Prescreen: >3 drinks/day or >7 drinks/week? No     Last PSA:   PSA   Date Value Ref Range Status   2019 0.32 0 - 4 ug/L Final     Comment:     Assay Method:  Chemiluminescence using Siemens Vista analyzer       Reviewed orders with patient. Reviewed health maintenance and updated orders accordingly - Yes      Reviewed and updated as needed this visit by clinical staff   Tobacco  Allergies  Meds              Reviewed and updated as needed this visit by Provider                 Past Medical History:   Diagnosis Date    ADD (attention deficit disorder)     Depressive disorder     Dysthymia     Elevated lipids     Generalised anxiety disorder 2012    Lung nodules     HARLAN (obstructive sleep apnea) 2011    cpap at night        Review of Systems   Constitutional:  Negative for chills and fever.   HENT:  Negative for congestion, ear pain, hearing loss and sore throat.    Eyes:  Negative for pain and visual disturbance.   Respiratory:  Negative for cough and shortness of breath.    Cardiovascular:  Negative for chest pain, palpitations and peripheral edema.   Gastrointestinal:  Negative for abdominal pain, constipation, diarrhea, heartburn, hematochezia and nausea.   Genitourinary:  Positive for frequency. Negative for dysuria, genital sores, hematuria, impotence, penile discharge and urgency.   Musculoskeletal:  Negative for arthralgias, joint swelling and myalgias.   Skin:  Negative for rash.   Neurological:  Negative for dizziness, weakness, headaches and paresthesias.   Psychiatric/Behavioral:  Negative for mood changes. The patient is not nervous/anxious.      OBJECTIVE:   /85 (BP Location: Left arm, Patient Position: Sitting, Cuff Size: Adult Large)   Pulse 79   Temp  "97.9  F (36.6  C) (Oral)   Resp 24   Ht 1.778 m (5' 10\")   Wt 90.9 kg (200 lb 6.4 oz)   SpO2 98%   BMI 28.75 kg/m      Physical Exam  GENERAL: healthy, alert and no distress  EYES: Eyes grossly normal to inspection, PERRL and conjunctivae and sclerae normal  HENT: ear canals and TM's normal, nose and mouth without ulcers or lesions  NECK: no adenopathy, no asymmetry, masses, or scars   RESP: lungs clear to auscultation - no rales, rhonchi or wheezes  CV: regular rate and rhythm, normal S1 S2, no S3 or S4, no murmur, click or rub,    ABDOMEN: soft, bowel sounds normal  SKIN: no suspicious lesions or rashes  PSYCH: mentation appears normal, affect normal/bright    CT CHEST W/O CONTRAST, 3/18/2019   COMPARISON: 7/30/2018, 12/7/2014   IMPRESSION: Little, if any change in the innumerable pulmonary nodules  in perilymphatic pattern and calcified mediastinal and hilar lymph  nodes most suggestive of sarcoidosis, less likely pneumoconiosis such  as silicosis. Patchy regions of segmental and subsegmental air  trapping suggestive of airway involvement.    Newer results are available. Click to view them now.      Component Bronchoscopy   Copath Report    Collected: 1/16/2015  -------------------------------------------------------------  CYTOLOGIC INTERPRETATION:    Lung, right middle lobe, bronchoalveolar lavage:  -Negative for malignancy  -GMS stain negative for fungal and pneumocystis organisms  -Viral cytopathic change absent  Specimen Adequacy: satisfactory for evaluation.                     ASSESSMENT/PLAN:   Khoa was seen today for physical. Benign Exam.    Diagnoses and all orders for this visit:    Routine general medical examination at a health care facility  -     REVIEW OF HEALTH MAINTENANCE PROTOCOL ORDERS  -     PRIMARY CARE FOLLOW-UP SCHEDULING; Future    Screen for colon cancer  -     Fecal colorectal cancer screen FIT - Future (S+30); Future    Dyslipidemia-Patient not fasting today, will return for " labs when fasting.   -     Comprehensive metabolic panel; Future  -     CBC with Platelets & Differential; Future  -     Lipid Profile; Future    Screening for prostate cancer-Grandfather had prostate cancer.   -     Prostate Specific Antigen Screen; Future    Pulmonary nodules-History of established multiple pulmonary lung nodules, etiology most liklely sarcoidosis vs ILD. CT scan 2014 showed bilateral calcified nodules, bronchoscopy in 2015 unremarkable. No family history of lung cancer. In 2019 recommended to follow up in ILD clinic with repeat PFT testing, does not need surveillance CT scans. (Reviewed this note)  Has not seen pulmonology for follow-up, referral placed.   ILD (interstitial lung disease) (H)  -     Adult Pulmonary Medicine  Referral; Future    Other orders  -     HEPATITIS B, ADULT 20+ (ENGERIX-B/RECOMBIVAX HB)  -     HEPATITIS A 19+ (HAVRIX/VAQTA)       COUNSELING:   Reviewed preventive health counseling, as reflected in patient instructions       Regular exercise       Healthy diet/nutrition       Immunizations  Vaccinated for: Hepatitis A and Hepatitis B           Alcohol Use     He reports that he quit smoking about 22 years ago. His smoking use included cigarettes. He has a 5.00 pack-year smoking history. He has never used smokeless tobacco.          PEGGY Taylor  [facility]

## 2023-12-14 ENCOUNTER — LAB (OUTPATIENT)
Dept: LAB | Facility: CLINIC | Age: 53
End: 2023-12-14
Payer: COMMERCIAL

## 2023-12-14 DIAGNOSIS — Z12.11 SCREEN FOR COLON CANCER: ICD-10-CM

## 2023-12-14 DIAGNOSIS — E78.5 DYSLIPIDEMIA: ICD-10-CM

## 2023-12-14 DIAGNOSIS — Z12.5 SCREENING FOR PROSTATE CANCER: ICD-10-CM

## 2023-12-14 LAB
ALBUMIN SERPL BCG-MCNC: 4.4 G/DL (ref 3.5–5.2)
ALP SERPL-CCNC: 109 U/L (ref 40–150)
ALT SERPL W P-5'-P-CCNC: 36 U/L (ref 0–70)
ANION GAP SERPL CALCULATED.3IONS-SCNC: 8 MMOL/L (ref 7–15)
AST SERPL W P-5'-P-CCNC: 28 U/L (ref 0–45)
BASOPHILS # BLD AUTO: 0 10E3/UL (ref 0–0.2)
BASOPHILS NFR BLD AUTO: 1 %
BILIRUB SERPL-MCNC: 0.4 MG/DL
BUN SERPL-MCNC: 20.9 MG/DL (ref 6–20)
CALCIUM SERPL-MCNC: 9.1 MG/DL (ref 8.6–10)
CHLORIDE SERPL-SCNC: 102 MMOL/L (ref 98–107)
CHOLEST SERPL-MCNC: 184 MG/DL
CREAT SERPL-MCNC: 0.91 MG/DL (ref 0.67–1.17)
DEPRECATED HCO3 PLAS-SCNC: 27 MMOL/L (ref 22–29)
EGFRCR SERPLBLD CKD-EPI 2021: >90 ML/MIN/1.73M2
EOSINOPHIL # BLD AUTO: 0.2 10E3/UL (ref 0–0.7)
EOSINOPHIL NFR BLD AUTO: 5 %
ERYTHROCYTE [DISTWIDTH] IN BLOOD BY AUTOMATED COUNT: 12.1 % (ref 10–15)
FASTING STATUS PATIENT QL REPORTED: YES
GLUCOSE SERPL-MCNC: 95 MG/DL (ref 70–99)
HCT VFR BLD AUTO: 41.7 % (ref 40–53)
HDLC SERPL-MCNC: 45 MG/DL
HGB BLD-MCNC: 14.4 G/DL (ref 13.3–17.7)
IMM GRANULOCYTES # BLD: 0 10E3/UL
IMM GRANULOCYTES NFR BLD: 0 %
LDLC SERPL CALC-MCNC: 120 MG/DL
LYMPHOCYTES # BLD AUTO: 1.3 10E3/UL (ref 0.8–5.3)
LYMPHOCYTES NFR BLD AUTO: 29 %
MCH RBC QN AUTO: 30.8 PG (ref 26.5–33)
MCHC RBC AUTO-ENTMCNC: 34.5 G/DL (ref 31.5–36.5)
MCV RBC AUTO: 89 FL (ref 78–100)
MONOCYTES # BLD AUTO: 0.4 10E3/UL (ref 0–1.3)
MONOCYTES NFR BLD AUTO: 10 %
NEUTROPHILS # BLD AUTO: 2.4 10E3/UL (ref 1.6–8.3)
NEUTROPHILS NFR BLD AUTO: 55 %
NONHDLC SERPL-MCNC: 139 MG/DL
NRBC # BLD AUTO: 0 10E3/UL
NRBC BLD AUTO-RTO: 0 /100
PLATELET # BLD AUTO: 137 10E3/UL (ref 150–450)
POTASSIUM SERPL-SCNC: 4.2 MMOL/L (ref 3.4–5.3)
PROT SERPL-MCNC: 7.5 G/DL (ref 6.4–8.3)
PSA SERPL DL<=0.01 NG/ML-MCNC: 1.07 NG/ML (ref 0–3.5)
RBC # BLD AUTO: 4.67 10E6/UL (ref 4.4–5.9)
SODIUM SERPL-SCNC: 137 MMOL/L (ref 135–145)
TRIGL SERPL-MCNC: 97 MG/DL
WBC # BLD AUTO: 4.4 10E3/UL (ref 4–11)

## 2023-12-14 PROCEDURE — G0103 PSA SCREENING: HCPCS

## 2023-12-14 PROCEDURE — 85025 COMPLETE CBC W/AUTO DIFF WBC: CPT

## 2023-12-14 PROCEDURE — 80053 COMPREHEN METABOLIC PANEL: CPT

## 2023-12-14 PROCEDURE — 36415 COLL VENOUS BLD VENIPUNCTURE: CPT

## 2023-12-14 PROCEDURE — 80061 LIPID PANEL: CPT

## 2023-12-19 ENCOUNTER — TELEPHONE (OUTPATIENT)
Dept: FAMILY MEDICINE | Facility: CLINIC | Age: 53
End: 2023-12-19
Payer: COMMERCIAL

## 2023-12-19 NOTE — TELEPHONE ENCOUNTER
Please contact patient as per unread [employer] message/result note:    .  Mr. Contreras,     Your LDL (bad cholesterol) was mildly above goal.  Genetics, diet, weight and low exercise levels can contribute to this.   Elevated LDL cholesterol increases a person's risk for heart and vascular disease. You need to recheck fasting labs yearly. Maintaining a healthy diet with lean proteins, whole grains and healthy fats such as olive oil as well as regular exercise and maintaining an appropriate weight all contribute to healthier cholesterol levels.     The rest of your labs were normal for you.     Please let me know if you have any questions.                 Sincerely,  Sean Hernandez PA-C

## 2023-12-19 NOTE — TELEPHONE ENCOUNTER
This writer attempted to contact patient on 12/19/23      Reason for call results and left message. Check to see if pt has read message on [employer] prior to calling back.       If patient calls back:   Relay message below, (read verbatim), document that pt called and close encounter        Joanne Yang RN

## 2023-12-20 NOTE — TELEPHONE ENCOUNTER
RN called patient. He states he was able to see the results and message in mychart and denies further questions or concerns at this time.     Nay Car RN, BSN, PHN  [facility]  Nurse Triage, [facility]

## 2023-12-31 PROCEDURE — 82274 ASSAY TEST FOR BLOOD FECAL: CPT

## 2024-01-05 LAB — HEMOCCULT STL QL IA: NEGATIVE

## 2024-02-27 ENCOUNTER — TELEPHONE (OUTPATIENT)
Dept: PULMONOLOGY | Facility: CLINIC | Age: 54
End: 2024-02-27
Payer: COMMERCIAL

## 2024-02-27 NOTE — TELEPHONE ENCOUNTER
ILD New Patient Referral: Pre visit communication    Patient: Conor Contreras  Reason for Referral: ILD  Referring Physician:     Donato Hernandez PA     Referring Clinic/Contact: Phone#: [facility]     Chest CT scan: 3/2019   Biopsy: 2014   PFT's:NA  Additional testing:     Current symptoms: no SOB, no symptoms  Current related prescriptions: no    Supplemental oxygen? No    In review of apparent records and conversation with patient; recommend first visit with ILD provider be conducted :  Testing needed: CT scan and Full PFT's and walk    Additional notes:       Would prefer [place] appts

## 2024-02-28 ENCOUNTER — TELEPHONE (OUTPATIENT)
Dept: PULMONOLOGY | Facility: CLINIC | Age: 54
End: 2024-02-28
Payer: COMMERCIAL

## 2024-02-28 DIAGNOSIS — J84.9 ILD (INTERSTITIAL LUNG DISEASE) (H): Primary | ICD-10-CM

## 2024-02-28 NOTE — TELEPHONE ENCOUNTER
Left Voicemail (1st Attempt) for the patient to call back and schedule the following:    Appointment type: New Pulm Referral  Provider: None  Return date: Next Avail  Specialty phone number: [phone] option 1  Additional appointment(s) needed: Full PFT  Additonal Notes: Ok to schedule in Gen Pulm

## 2024-04-01 DIAGNOSIS — J84.9 ILD (INTERSTITIAL LUNG DISEASE) (H): Primary | ICD-10-CM

## 2024-04-02 ENCOUNTER — E-VISIT (OUTPATIENT)
Dept: FAMILY MEDICINE | Facility: CLINIC | Age: 54
End: 2024-04-02
Payer: COMMERCIAL

## 2024-04-02 DIAGNOSIS — N40.1 BENIGN PROSTATIC HYPERPLASIA WITH URINARY HESITANCY: Primary | ICD-10-CM

## 2024-04-02 DIAGNOSIS — R39.11 BENIGN PROSTATIC HYPERPLASIA WITH URINARY HESITANCY: Primary | ICD-10-CM

## 2024-04-02 PROCEDURE — 99421 OL DIG E/M SVC 5-10 MIN: CPT | Performed by: INTERNAL MEDICINE

## 2024-04-03 RX ORDER — TAMSULOSIN HYDROCHLORIDE 0.4 MG/1
0.4 CAPSULE ORAL EVERY EVENING
Qty: 90 CAPSULE | Refills: 1 | Status: SHIPPED | OUTPATIENT
Start: 2024-04-03 | End: 2024-06-25

## 2024-04-29 ENCOUNTER — TELEPHONE (OUTPATIENT)
Facility: CLINIC | Age: 54
End: 2024-04-29
Payer: COMMERCIAL

## 2024-04-29 NOTE — TELEPHONE ENCOUNTER
[facility] Call Center    Phone Message    May a detailed message be left on voicemail: yes     Reason for Call: Other: Upcoming Appt   Patient wanted to inform the care team that he had some sort of viral infection last week. Please call back to follow-up. Thank you.     Action Taken: Other: Pulm    Travel Screening: Not Applicable

## 2024-04-29 NOTE — TELEPHONE ENCOUNTER
Contacted patient regarding pulmonary consultation. Patient states that he was under the weather last week when he did his E-check in and wanted to update the care team. Patient is okay to come into clinic for his scheduled pulmonary consultation. Patient expressed understanding.    Brooklyn Simon LPN  Pulmonary Medicine:  [facility]  Phone: [phone] Fax: [phone]

## 2024-04-30 ENCOUNTER — ANCILLARY PROCEDURE (OUTPATIENT)
Dept: CT IMAGING | Facility: CLINIC | Age: 54
End: 2024-04-30
Payer: COMMERCIAL

## 2024-04-30 ENCOUNTER — OFFICE VISIT (OUTPATIENT)
Dept: NURSING | Facility: CLINIC | Age: 54
End: 2024-04-30
Payer: COMMERCIAL

## 2024-04-30 VITALS — BODY MASS INDEX: 29.79 KG/M2 | WEIGHT: 207.6 LBS | HEART RATE: 92 BPM | OXYGEN SATURATION: 97 %

## 2024-04-30 DIAGNOSIS — J84.9 ILD (INTERSTITIAL LUNG DISEASE) (H): ICD-10-CM

## 2024-04-30 LAB
DLCOUNC-%PRED-PRE: 92 %
DLCOUNC-PRE: 26.67 ML/MIN/MMHG
DLCOUNC-PRED: 28.86 ML/MIN/MMHG
ERV-%PRED-PRE: 71 %
ERV-PRE: 1.18 L
ERV-PRED: 1.65 L
EXPTIME-PRE: 7.43 SEC
FEF2575-%PRED-PRE: 100 %
FEF2575-PRE: 3.15 L/SEC
FEF2575-PRED: 3.15 L/SEC
FEFMAX-%PRED-PRE: 75 %
FEFMAX-PRE: 7.28 L/SEC
FEFMAX-PRED: 9.66 L/SEC
FEV1-%PRED-PRE: 100 %
FEV1-PRE: 3.58 L
FEV1FEV6-PRE: 78 %
FEV1FEV6-PRED: 80 %
FEV1FVC-PRE: 77 %
FEV1FVC-PRED: 79 %
FEV1SVC-PRE: 75 %
FEV1SVC-PRED: 76 %
FIFMAX-PRE: 5.04 L/SEC
FRCPLETH-%PRED-PRE: 89 %
FRCPLETH-PRE: 3.21 L
FRCPLETH-PRED: 3.6 L
FVC-%PRED-PRE: 102 %
FVC-PRE: 4.63 L
FVC-PRED: 4.52 L
IC-%PRED-PRE: 109 %
IC-PRE: 3.62 L
IC-PRED: 3.3 L
RVPLETH-%PRED-PRE: 87 %
RVPLETH-PRE: 2.03 L
RVPLETH-PRED: 2.33 L
TLCPLETH-%PRED-PRE: 94 %
TLCPLETH-PRE: 6.83 L
TLCPLETH-PRED: 7.23 L
VA-%PRED-PRE: 93 %
VA-PRE: 6.3 L
VC-%PRED-PRE: 102 %
VC-PRE: 4.8 L
VC-PRED: 4.67 L

## 2024-04-30 PROCEDURE — 94726 PLETHYSMOGRAPHY LUNG VOLUMES: CPT | Performed by: INTERNAL MEDICINE

## 2024-04-30 PROCEDURE — 94729 DIFFUSING CAPACITY: CPT | Performed by: INTERNAL MEDICINE

## 2024-04-30 PROCEDURE — 94375 RESPIRATORY FLOW VOLUME LOOP: CPT | Performed by: INTERNAL MEDICINE

## 2024-04-30 PROCEDURE — 71250 CT THORAX DX C-: CPT | Performed by: RADIOLOGY

## 2024-05-01 ENCOUNTER — OFFICE VISIT (OUTPATIENT)
Dept: PULMONOLOGY | Facility: CLINIC | Age: 54
End: 2024-05-01
Attending: PHYSICIAN ASSISTANT
Payer: COMMERCIAL

## 2024-05-01 VITALS
BODY MASS INDEX: 29.99 KG/M2 | OXYGEN SATURATION: 96 % | DIASTOLIC BLOOD PRESSURE: 77 MMHG | WEIGHT: 209 LBS | HEART RATE: 96 BPM | SYSTOLIC BLOOD PRESSURE: 120 MMHG

## 2024-05-01 DIAGNOSIS — J45.20 MILD INTERMITTENT ASTHMA WITHOUT COMPLICATION: ICD-10-CM

## 2024-05-01 DIAGNOSIS — J84.9 ILD (INTERSTITIAL LUNG DISEASE) (H): ICD-10-CM

## 2024-05-01 DIAGNOSIS — D86.9 SARCOIDOSIS: Primary | ICD-10-CM

## 2024-05-01 PROCEDURE — 82652 VIT D 1 25-DIHYDROXY: CPT | Performed by: INTERNAL MEDICINE

## 2024-05-01 PROCEDURE — 36415 COLL VENOUS BLD VENIPUNCTURE: CPT | Performed by: INTERNAL MEDICINE

## 2024-05-01 PROCEDURE — 82306 VITAMIN D 25 HYDROXY: CPT | Performed by: INTERNAL MEDICINE

## 2024-05-01 PROCEDURE — 99000 SPECIMEN HANDLING OFFICE-LAB: CPT | Performed by: INTERNAL MEDICINE

## 2024-05-01 PROCEDURE — 82164 ANGIOTENSIN I ENZYME TEST: CPT | Mod: 90 | Performed by: INTERNAL MEDICINE

## 2024-05-01 PROCEDURE — 99205 OFFICE O/P NEW HI 60 MIN: CPT | Performed by: INTERNAL MEDICINE

## 2024-05-01 RX ORDER — FLUTICASONE PROPIONATE AND SALMETEROL 250; 50 UG/1; UG/1
1 POWDER RESPIRATORY (INHALATION) EVERY 12 HOURS
Qty: 1 EACH | Refills: 0 | Status: SHIPPED | OUTPATIENT
Start: 2024-05-01 | End: 2024-07-25

## 2024-05-01 NOTE — PROGRESS NOTES
Pulmonary Clinic New Patient Consult  Reason for Consult: Sarcoid  History of Present Illness  I had the pleasure of seeing Conor Contreras, who is a 54 year old with a history of pulmonary sarcoid (not biopsy proven), pulmonary nodules who presents to clinic to re-establish care. He was previously seen by Dr. Williamson and Neel.  To briefly review, he was seen in 201 for new symptoms of cough and SOB. CT chest performed in 2014 showing bilateral calcified nodules and mediastinal/hilar LAD. He had EBUS-TBNA in 2015 which was non-diagnostic with regard to FNA of LN. At that time, cultures were negative. IgG subclasses were unremarkable. CD4:CD8 ratio was 0.9. Airway exam was unremarkable. He did not require prednisone nor any treatments for sarcoid. A FENa done at that time was elevated at 41. PFTs showed no obstruction nor restriction but did have some inspiratory loop flattening.  Today, he has no new symptoms. Mild SOB to strenuous activity like walking at a fast pace or climbing a flight of stairs otherwise he denies any cough, wheezing, chest tightness, no chest pain, no blurry vision, no palpitations, no skin lesions, no leg swellings. He does have a raspy voice which is chronic for him.   The patient has no family history of sarcoid.  He has a less than 10-pack-year tobacco use history.  He does have a history of inhaled as well as intravenous drug use in the past.  He has no active use of recreational drugs.  He has used methamphetamine, cocaine and marijuana in the past.   Personal hx of cancer: no  - Family hx of cancer: no  - Exposure hx: Denies asbestos or radon exposure   - Tobacco hx: 1/2ppd x5yrs. Quit 20yrs ago.   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 Has 4 cats and 1 dog. No allergies.       Review of Systems:  10 of 14 systems reviewed and are negative unless otherwise stated in HPI.    Past Medical History:   Diagnosis Date    ADD (attention deficit disorder) 2003    Depressive disorder     Dysthymia      Elevated lipids     Generalised anxiety disorder 2012    Lung nodules     HARLAN (obstructive sleep apnea) 2011    cpap at night       Past Surgical History:   Procedure Laterality Date    ENDOBRONCHIAL ULTRASOUND FLEXIBLE N/A 2015    Procedure: ENDOBRONCHIAL ULTRASOUND FLEXIBLE;  Surgeon: Ramakrishna Williamson MD;  Location: [facility]    VASECTOMY         Family History   Problem Relation Age of Onset    C.A.D. Maternal Grandfather     Alcohol/Drug Maternal Grandfather          from alcoholism    Cerebrovascular Disease Maternal Grandfather     Coronary Artery Disease Maternal Grandfather     Prostate Cancer Maternal Grandfather     Lipids Father     Cancer Maternal Grandmother         Pancreatic    Cancer Paternal Grandmother         Lung, may not have been primary    Heart Disease Paternal Grandfather     Anxiety Disorder Sister     Anxiety Disorder Sister     LUNG DISEASE Maternal Uncle         Asthma versus sarcoid    Asthma Other     Diabetes No family hx of     Hypertension No family hx of     Breast Cancer No family hx of     Cancer - colorectal No family hx of     Connective Tissue Disorder No family hx of     Thyroid Disease No family hx of        Social History     Socioeconomic History    Marital status:      Spouse name: Lesly    Number of children: 0   Occupational History    Occupation: Alcohol and drug counselor     Employer: [employer]     Comment: [employer]   Tobacco Use    Smoking status: Former     Current packs/day: 0.00     Average packs/day: 0.5 packs/day for 10.0 years (5.0 ttl pk-yrs)     Types: Cigarettes     Start date: 3/18/1991     Quit date: 3/18/2001     Years since quittin.1    Smokeless tobacco: Never    Tobacco comments:     QUIT 9 YEARS AGO   Vaping Use    Vaping status: Never Used   Substance and Sexual Activity    Alcohol use: Yes     Comment: 6 to 8 beers per week    Drug use: No     Comment: h/o drugs, denies past 13 yrs     Sexual activity: Yes     Partners: Female     Birth control/protection: Male Surgical     Comment:  one partner   Other Topics Concern    Parent/sibling w/ CABG, MI or angioplasty before 65F 55M? No     Service No    Blood Transfusions No    Caffeine Concern No    Occupational Exposure No    Hobby Hazards No    Sleep Concern No    Stress Concern No    Weight Concern No    Special Diet No    Back Care No    Exercise Yes     Comment: Sometimes    Bike Helmet No    Seat Belt Yes    Self-Exams No   Social History Narrative    The patient has a 10 or less pk yr tobacco hx.  He has no active use.  Alcohol use is 12 alcoholic drinks per week.   He has hx of recreational drug use with methamphetamines - inhaled, marijuana. Some rare hx of IVDU.         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      Social Determinants of Health     Financial Resource Strain: Low Risk  (12/5/2023)    Financial Resource Strain     Within the past 12 months, have you or your family members you live with been unable to get utilities (heat, electricity) when it was really needed?: No   Food Insecurity: Low Risk  (12/5/2023)    Food Insecurity     Within the past 12 months, did you worry that your food would run out before you got money to buy more?: No     Within the past 12 months, did the food you bought just not last and you didn t have money to get more?: No   Transportation Needs: Low Risk  (12/5/2023)    Transportation Needs     Within the past 12 months, has lack of transportation kept you from medical appointments, getting your medicines, non-medical meetings or appointments, work, or from getting things that you need?: No   Interpersonal Safety: Low Risk  (12/5/2023)    Interpersonal Safety     Do you feel physically and emotionally safe where you currently live?: Yes     Within the past 12  months, have you been hit, slapped, kicked or otherwise physically hurt by someone?: No     Within the past 12 months, have you been humiliated or emotionally abused in other ways by your partner or ex-partner?: No   Housing Stability: Low Risk  (12/5/2023)    Housing Stability     Do you have housing? : Yes     Are you worried about losing your housing?: No         No Known Allergies      Current Outpatient Medications:     amphetamine-dextroamphetamine (ADDERALL XR) 15 MG per 24 hr capsule, TAKE ONE CAPSULE BY MOUTH EVERY MORNING PLEASE MAKE AN APPOINTMENT FOR FUTURE REFILLS, Disp: , Rfl: 0    baclofen (LIORESAL) 10 MG tablet, TAKE 1 TABLET(10 MG) BY MOUTH THREE TIMES DAILY AS NEEDED FOR MUSCLE SPASMS, Disp: 90 tablet, Rfl: 3    cetirizine (ZYRTEC) 10 MG tablet, Take 1 tablet (10 mg) by mouth At Bedtime, Disp: 30 tablet, Rfl: 11    tamsulosin (FLOMAX) 0.4 MG capsule, Take 1 capsule (0.4 mg) by mouth every evening, Disp: 90 capsule, Rfl: 1    venlafaxine (EFFEXOR XR) 150 MG 24 hr capsule, Take 150 mg by mouth daily, Disp: , Rfl:       Physical Exam:  There were no vitals taken for this visit.  GENERAL: Well developed, well nourished, alert, and in no apparent distress.  HEENT: Normocephalic, atraumatic. PERRL, EOMI. Oral mucosa is moist. No perioral cyanosis.  NECK: supple, no masses, no thyromegaly.  RESP:  Normal respiratory effort.  CTAB.  No rales, wheezes, rhonchi.  No cyanosis or clubbing.  CV: Normal S1, S2, regular rhythm, normal rate. No murmur.  No LE edema.   ABDOMEN:  Soft, non-tender, non-distended.   SKIN: warm and dry. No rash.  NEURO: AAOx3.  Normal gait.  Fluent speech.  PSYCH: mentation appears normal.       Results:  PFTs: Discussed and reviewed with patient- flattening of inspiratory loop  Most Recent [facility] Pulmonary Function Testing    FVC-Pred   Date Value Ref Range Status   04/30/2024 4.52 L      FVC-Pre   Date Value Ref Range Status   04/30/2024 4.63 L      FVC-%Pred-Pre   Date Value Ref  "Range Status   04/30/2024 102 %      FEV1-Pre   Date Value Ref Range Status   04/30/2024 3.58 L      FEV1-%Pred-Pre   Date Value Ref Range Status   04/30/2024 100 %      FEV1FVC-Pred   Date Value Ref Range Status   04/30/2024 79 %      FEV1FVC-Pre   Date Value Ref Range Status   04/30/2024 77 %      No results found for: \"20029\"  FEFMax-Pred   Date Value Ref Range Status   04/30/2024 9.66 L/sec      FEFMax-Pre   Date Value Ref Range Status   04/30/2024 7.28 L/sec      FEFMax-%Pred-Pre   Date Value Ref Range Status   04/30/2024 75 %      ExpTime-Pre   Date Value Ref Range Status   04/30/2024 7.43 sec      FIFMax-Pre   Date Value Ref Range Status   04/30/2024 5.04 L/sec      FEV1FEV6-Pred   Date Value Ref Range Status   04/30/2024 80 %      FEV1FEV6-Pre   Date Value Ref Range Status   04/30/2024 78 %      No results found for: \"20055\"   Imaging (personally reviewed in clinic today):      Assessment and Plan:   Pulmonary Sarcoid (Not biopsy proven)  CT chest performed in 2014 showing bilateral calcified nodules and mediastinal/hilar LAD. He had EBUS-TBNA in 2015 which was non-diagnostic with regard to FNA of LN. At that time, cultures were negative. IgG subclasses were unremarkable. CD4:CD8 ratio was 0.9. Airway exam was unremarkable. He did not require prednisone nor any treatments for sarcoid.  Current CT chest shows no new findings suggestive of progression. No fibrotic changes. There are some evidence of air trapping and he may have airway sarcoid. I will try him on some ICS and prescription for Advair was given with refills. Air trapping can also be due to asthma as he had elevated FENo in the past. I will check Vit D studies and ACE level to evaluate for disease activity and have a baseline.   He is up to date on vaccinations and his lung function has remained stable.  He has no evidence of extrapulmonary sarcoid. I advised him to get yearly eye exams which he declined. EKGs and echos have been unremarkable. " There is no indication for getting cardiac MRIs or PET at this time.  Mild intermittent asthma  Based on symptoms, radiologic air trapping and elevated FENo. Trial of Advair to see if he derives any benefit.         Questions and concerns were answered to the patient's satisfaction.  he was provided with my contact information should new questions or concerns arise in the interim.  he should return to clinic in 12 months with PFTs  Up to date on vaccinations    I spent 60 minutes on the date of the encounter doing chart review, history and exam, documentation and further coordination as noted above exclusive of time interpreting PFT, Chest Xray, CT Chest.  Sherlyn Rice MD  Pulmonary, Critical Care and Sleep Medicine  [facility]  Pager: [phone]        The above note was dictated using voice recognition software and may include typographical errors. Please contact the author for any clarifications.

## 2024-05-01 NOTE — NURSING NOTE
Conor Contreras's goals for this visit include:   Chief Complaint   Patient presents with    Consult     Referred by Donato Hernandez  ILD        He requests these members of his care team be copied on today's visit information: yes     PCP: Rocky Yi    Referring Provider:  PEGGY Taylor  [address] [place] [address]    /77 (BP Location: Left arm, Patient Position: Chair, Cuff Size: Adult Regular)   Pulse 96   Wt 94.8 kg (209 lb)   SpO2 96%   BMI 29.99 kg/m      Do you need any medication refills at today's visit? No     ALLAN Cortez   Neph/Pulm [facility]

## 2024-05-02 LAB
1,25(OH)2D SERPL-MCNC: 46.2 PG/ML (ref 19.9–79.3)
VIT D+METAB SERPL-MCNC: 23 NG/ML (ref 20–50)

## 2024-05-03 LAB — ACE SERPL-CCNC: 89 U/L

## 2024-05-07 ENCOUNTER — E-VISIT (OUTPATIENT)
Dept: FAMILY MEDICINE | Facility: CLINIC | Age: 54
End: 2024-05-07
Payer: COMMERCIAL

## 2024-05-07 DIAGNOSIS — J30.89 SEASONAL ALLERGIC RHINITIS DUE TO OTHER ALLERGIC TRIGGER: ICD-10-CM

## 2024-05-07 DIAGNOSIS — Z91.09 ALLERGY TO ENVIRONMENTAL FACTORS: Primary | ICD-10-CM

## 2024-05-07 PROCEDURE — 99422 OL DIG E/M SVC 11-20 MIN: CPT | Performed by: INTERNAL MEDICINE

## 2024-05-09 RX ORDER — MONTELUKAST SODIUM 10 MG/1
10 TABLET ORAL AT BEDTIME
Qty: 30 TABLET | Refills: 5 | Status: SHIPPED | OUTPATIENT
Start: 2024-05-09 | End: 2024-06-19

## 2024-05-12 ENCOUNTER — MYC MEDICAL ADVICE (OUTPATIENT)
Dept: PULMONOLOGY | Facility: CLINIC | Age: 54
End: 2024-05-12
Payer: COMMERCIAL

## 2024-05-13 RX ORDER — MOMETASONE FUROATE MONOHYDRATE 50 UG/1
2 SPRAY, METERED NASAL DAILY
Qty: 17 G | Refills: 5 | Status: SHIPPED | OUTPATIENT
Start: 2024-05-13

## 2024-05-13 NOTE — ADDENDUM NOTE
Addended by: JACKELINE ZADLIVAR on: 5/13/2024 06:59 AM     Modules accepted: Orders     Abdomen soft, non-tender, no guarding.

## 2024-06-05 ENCOUNTER — TELEPHONE (OUTPATIENT)
Dept: SLEEP MEDICINE | Facility: CLINIC | Age: 54
End: 2024-06-05
Payer: COMMERCIAL

## 2024-06-05 DIAGNOSIS — G47.33 OSA (OBSTRUCTIVE SLEEP APNEA): Primary | ICD-10-CM

## 2024-06-05 NOTE — TELEPHONE ENCOUNTER
General Call      Reason for Call:  patient called and would like an order for cpap supplies from [facility]     States DME also tried to reach out.    Please contact patient.  Thank you.    What are your questions or concerns:  na    Date of last appointment with provider: 2023 Feb   Could we send this information to you in [facility] [place] or would you prefer to receive a phone call?:   Patient would prefer a phone call   Okay to leave a detailed message?: Yes at Cell number on file:    Telephone Information:   Mobile [phone]

## 2024-06-06 NOTE — TELEPHONE ENCOUNTER
Last ov 2/14/23  Next ov follow up in 2 years    Patient requesting updated order for CPAP supplies prior to appointment. Order pended and routed to provider for consideration.    Palma ALVARADO RN  [facility]      Message left for patient requesting clarification of DME. No requests from [facility] in chart    Patient returned call. He uses [employer].

## 2024-06-07 ENCOUNTER — TELEPHONE (OUTPATIENT)
Dept: SLEEP MEDICINE | Facility: CLINIC | Age: 54
End: 2024-06-07
Payer: COMMERCIAL

## 2024-06-19 ENCOUNTER — MYC MEDICAL ADVICE (OUTPATIENT)
Dept: FAMILY MEDICINE | Facility: CLINIC | Age: 54
End: 2024-06-19

## 2024-06-19 ENCOUNTER — E-VISIT (OUTPATIENT)
Dept: FAMILY MEDICINE | Facility: CLINIC | Age: 54
End: 2024-06-19
Payer: COMMERCIAL

## 2024-06-19 DIAGNOSIS — J30.89 SEASONAL ALLERGIC RHINITIS DUE TO OTHER ALLERGIC TRIGGER: Primary | ICD-10-CM

## 2024-06-19 DIAGNOSIS — J30.89 SEASONAL ALLERGIC RHINITIS DUE TO OTHER ALLERGIC TRIGGER: ICD-10-CM

## 2024-06-19 PROCEDURE — 99421 OL DIG E/M SVC 5-10 MIN: CPT | Performed by: INTERNAL MEDICINE

## 2024-06-19 RX ORDER — PREDNISONE 10 MG/1
10 TABLET ORAL EVERY MORNING
Qty: 10 TABLET | Refills: 0 | Status: SHIPPED | OUTPATIENT
Start: 2024-06-19 | End: 2024-07-25

## 2024-06-19 RX ORDER — AZELASTINE 1 MG/ML
2 SPRAY, METERED NASAL 2 TIMES DAILY
Qty: 30 ML | Refills: 5 | Status: SHIPPED | OUTPATIENT
Start: 2024-06-19 | End: 2024-06-19

## 2024-06-19 RX ORDER — AZELASTINE 1 MG/ML
2 SPRAY, METERED NASAL 2 TIMES DAILY
Qty: 90 ML | Refills: 0 | Status: SHIPPED | OUTPATIENT
Start: 2024-06-19 | End: 2024-10-01

## 2024-06-19 NOTE — TELEPHONE ENCOUNTER
[facility] advised appointment x2. Routing to provider.

## 2024-06-25 ENCOUNTER — VIRTUAL VISIT (OUTPATIENT)
Dept: FAMILY MEDICINE | Facility: CLINIC | Age: 54
End: 2024-06-25
Payer: COMMERCIAL

## 2024-06-25 DIAGNOSIS — R39.11 BENIGN PROSTATIC HYPERPLASIA WITH URINARY HESITANCY: Primary | ICD-10-CM

## 2024-06-25 DIAGNOSIS — N40.1 BENIGN PROSTATIC HYPERPLASIA WITH URINARY HESITANCY: Primary | ICD-10-CM

## 2024-06-25 PROCEDURE — 99214 OFFICE O/P EST MOD 30 MIN: CPT | Mod: 95 | Performed by: INTERNAL MEDICINE

## 2024-06-25 PROCEDURE — G2211 COMPLEX E/M VISIT ADD ON: HCPCS | Mod: 95 | Performed by: INTERNAL MEDICINE

## 2024-06-25 RX ORDER — TAMSULOSIN HYDROCHLORIDE 0.4 MG/1
0.8 CAPSULE ORAL EVERY EVENING
Qty: 180 CAPSULE | Refills: 3 | Status: SHIPPED | OUTPATIENT
Start: 2024-06-25 | End: 2024-09-05

## 2024-06-25 ASSESSMENT — ASTHMA QUESTIONNAIRES
QUESTION_2 LAST FOUR WEEKS HOW OFTEN HAVE YOU HAD SHORTNESS OF BREATH: ONCE A DAY
ACT_TOTALSCORE: 18
QUESTION_4 LAST FOUR WEEKS HOW OFTEN HAVE YOU USED YOUR RESCUE INHALER OR NEBULIZER MEDICATION (SUCH AS ALBUTEROL): ONCE A WEEK OR LESS
ACT_TOTALSCORE: 18
QUESTION_1 LAST FOUR WEEKS HOW MUCH OF THE TIME DID YOUR ASTHMA KEEP YOU FROM GETTING AS MUCH DONE AT WORK, SCHOOL OR AT HOME: A LITTLE OF THE TIME
QUESTION_3 LAST FOUR WEEKS HOW OFTEN DID YOUR ASTHMA SYMPTOMS (WHEEZING, COUGHING, SHORTNESS OF BREATH, CHEST TIGHTNESS OR PAIN) WAKE YOU UP AT NIGHT OR EARLIER THAN USUAL IN THE MORNING: NOT AT ALL
QUESTION_5 LAST FOUR WEEKS HOW WOULD YOU RATE YOUR ASTHMA CONTROL: SOMEWHAT CONTROLLED

## 2024-06-25 ASSESSMENT — PATIENT HEALTH QUESTIONNAIRE - PHQ9
SUM OF ALL RESPONSES TO PHQ QUESTIONS 1-9: 0
SUM OF ALL RESPONSES TO PHQ QUESTIONS 1-9: 0
10. IF YOU CHECKED OFF ANY PROBLEMS, HOW DIFFICULT HAVE THESE PROBLEMS MADE IT FOR YOU TO DO YOUR WORK, TAKE CARE OF THINGS AT HOME, OR GET ALONG WITH OTHER PEOPLE: NOT DIFFICULT AT ALL

## 2024-06-25 NOTE — PROGRESS NOTES
Khoa is a 54 year old who is being evaluated via a billable video visit.    How would you like to obtain your AVS? MyChart  If the video visit is dropped, the invitation should be resent by: Text to cell phone: [phone]  Will anyone else be joining your video visit? No    [facility] Internal Medicine Progress Note           Assessment and Plan:     Benign prostatic hyperplasia with urinary hesitancy  - Adult Urology  Referral; Future  - tamsulosin (FLOMAX) 0.4 MG capsule; Take 2 capsules (0.8 mg) by mouth every evening          Interval History:     Reason for visit:  Urinary problem  Symptom onset:  More than a month  Symptoms include:  Takes longer to go, takes longer to empty bladder  Symptom intensity:  Moderate  Symptom progression:  Staying the same  Had these symptoms before:  Yes  Has tried/received treatment for these symptoms:  Yes  Previous treatment was successful:  No  What makes it worse:  None  What makes it better:  None           Significant Problems:     Patient Active Problem List   Diagnosis    ADD (attention deficit disorder)    HARLAN (obstructive sleep apnea)    Hyperlipidemia LDL goal <160    Generalized anxiety disorder    Benign skin lesion of face    Dysthymia    Allergic rhinitis    Abnormal CXR perihilar nodes on CT chest    Palpitations    Mediastinal lymphadenopathy    Cervical myofascial strain, sequela    Chronic pain of left knee    Pulmonary nodules    Benign prostatic hyperplasia with urinary hesitancy              Review of Systems:     CONSTITUTIONAL: NEGATIVE for fever, chills, change in weight  INTEGUMENTARY/SKIN: NEGATIVE for worrisome rashes, moles or lesions  EYES: NEGATIVE for vision changes or irritation  ENT/MOUTH: NEGATIVE for ear, mouth and throat problems  RESP: NEGATIVE for significant cough or SOB  CV: NEGATIVE for chest pain, palpitations or peripheral edema  GI: NEGATIVE for nausea, abdominal pain, heartburn, or change in bowel habits    male :As above.  MUSCULOSKELETAL: NEGATIVE for significant arthralgias or myalgia  NEURO: NEGATIVE for weakness, dizziness or paresthesias  ENDOCRINE: NEGATIVE for temperature intolerance, skin/hair changes  HEME: NEGATIVE for bleeding problems  PSYCHIATRIC: NEGATIVE for changes in mood or affect             Physical Exam:   Vital signs and physical exam not performed due to nature of visit.          Data:   Epic reviewed.     Disposition:  Follow-up in 12 weeks or as needed.      Rocky Yi MD  Internal Medicine  [facility] Team        Video-Visit Details     Type of service:  Video Visit  Video Start Time: 8:30 AM  Video End Time: 8:45 AM  Originating Location (pt. Location): Home  Distant Location (provider location):  [facility]   Platform used for Video Visit: [employer]

## 2024-07-24 ENCOUNTER — E-VISIT (OUTPATIENT)
Dept: FAMILY MEDICINE | Facility: CLINIC | Age: 54
End: 2024-07-24
Payer: COMMERCIAL

## 2024-07-24 DIAGNOSIS — J30.89 SEASONAL ALLERGIC RHINITIS DUE TO OTHER ALLERGIC TRIGGER: Primary | ICD-10-CM

## 2024-07-24 DIAGNOSIS — J45.20 MILD INTERMITTENT ASTHMA WITHOUT COMPLICATION: ICD-10-CM

## 2024-07-24 DIAGNOSIS — J45.21 MILD INTERMITTENT ASTHMA WITH ALLERGIC RHINITIS WITH ACUTE EXACERBATION: ICD-10-CM

## 2024-07-24 DIAGNOSIS — J45.30 MILD PERSISTENT ASTHMA WITHOUT COMPLICATION: ICD-10-CM

## 2024-07-24 PROCEDURE — 99422 OL DIG E/M SVC 11-20 MIN: CPT | Performed by: INTERNAL MEDICINE

## 2024-07-25 DIAGNOSIS — J45.21 MILD INTERMITTENT ASTHMA WITH ALLERGIC RHINITIS WITH ACUTE EXACERBATION: ICD-10-CM

## 2024-07-25 RX ORDER — FLUTICASONE PROPIONATE AND SALMETEROL 250; 50 UG/1; UG/1
1 POWDER RESPIRATORY (INHALATION) EVERY 12 HOURS
Qty: 60 EACH | Refills: 0 | Status: SHIPPED | OUTPATIENT
Start: 2024-07-25 | End: 2024-07-31 | Stop reason: DRUGHIGH

## 2024-07-25 RX ORDER — MONTELUKAST SODIUM 10 MG/1
10 TABLET ORAL AT BEDTIME
Qty: 30 TABLET | Refills: 5 | Status: SHIPPED | OUTPATIENT
Start: 2024-07-25 | End: 2024-09-23

## 2024-07-26 RX ORDER — ALBUTEROL SULFATE 90 UG/1
1-2 AEROSOL, METERED RESPIRATORY (INHALATION) EVERY 4 HOURS PRN
Qty: 18 G | Refills: 2 | Status: SHIPPED | OUTPATIENT
Start: 2024-07-26

## 2024-07-26 RX ORDER — FLUTICASONE PROPIONATE AND SALMETEROL 250; 50 UG/1; UG/1
1 POWDER RESPIRATORY (INHALATION) EVERY 12 HOURS
OUTPATIENT
Start: 2024-07-26

## 2024-07-31 RX ORDER — FLUTICASONE PROPIONATE AND SALMETEROL 100; 50 UG/1; UG/1
1 POWDER RESPIRATORY (INHALATION) EVERY 12 HOURS
Qty: 60 EACH | Refills: 1 | Status: SHIPPED | OUTPATIENT
Start: 2024-07-31 | End: 2024-09-23

## 2024-09-05 ENCOUNTER — OFFICE VISIT (OUTPATIENT)
Dept: UROLOGY | Facility: CLINIC | Age: 54
End: 2024-09-05
Attending: INTERNAL MEDICINE
Payer: COMMERCIAL

## 2024-09-05 DIAGNOSIS — R39.9 LOWER URINARY TRACT SYMPTOMS (LUTS): Primary | ICD-10-CM

## 2024-09-05 PROCEDURE — 99204 OFFICE O/P NEW MOD 45 MIN: CPT | Mod: 25 | Performed by: UROLOGY

## 2024-09-05 PROCEDURE — 51798 US URINE CAPACITY MEASURE: CPT | Performed by: UROLOGY

## 2024-09-05 RX ORDER — ALFUZOSIN HYDROCHLORIDE 10 MG/1
10 TABLET, EXTENDED RELEASE ORAL DAILY
Qty: 90 TABLET | Refills: 3 | Status: SHIPPED | OUTPATIENT
Start: 2024-09-05 | End: 2025-09-05

## 2024-09-05 NOTE — PROGRESS NOTES
Urology Consult History and Physical  DIAZ CHANEL   Name: Conor Contreras    MRN: [id number]   YOB: 1970       We were asked to see Conor Contreras at the request of Dr. Yi for evaluation and treatment of LUTS.        Chief Complaint:   LUTS    History is obtained from the patient            History of Present Illness:   Conor Contreras is a 54 year old male who is being seen for evaluation of LUTS    He did try tamsulosin 0.4mg (Flomax) daily for about 4 months  He did try increasing to BID dosing for a few weeks  Stopped about 3 weeks ago after developing some side effects of dizziness and lightheaded when working out  Symptoms seem somewhat intermittent and situational   Worse symptoms when out in public    Drinks 2-3 cups of coffee in the morning  Then water during the day   1-2x/week evening beer  He is limiting evening fluid consumption before bed    AUASS: 5-8-8-1-3-3-0 = 19/20  QOL = 5  PVR = 2cc    No known family history of prostate cancer or prostate issues           Past Medical History:     Past Medical History:   Diagnosis Date    ADD (attention deficit disorder) 2003    Depressive disorder     Dysthymia 2002    Elevated lipids     Generalised anxiety disorder 2012    Lung nodules     HARLAN (obstructive sleep apnea) 2011    cpap at night            Past Surgical History:     Past Surgical History:   Procedure Laterality Date    ENDOBRONCHIAL ULTRASOUND FLEXIBLE N/A 2015    Procedure: ENDOBRONCHIAL ULTRASOUND FLEXIBLE;  Surgeon: Ramakrishna Williamson MD;  Location:  GI    VASECTOMY              Social History:     Social History     Tobacco Use    Smoking status: Former     Current packs/day: 0.00     Average packs/day: 0.5 packs/day for 10.0 years (5.0 ttl pk-yrs)     Types: Cigarettes     Start date: 3/18/1991     Quit date: 3/18/2001     Years since quittin.4    Smokeless tobacco: Never    Tobacco comments:     QUIT 9 YEARS AGO   Substance Use Topics     Alcohol use: Yes     Comment: 6 to 8 beers per week       History   Smoking Status    Former    Types: Cigarettes   Smokeless Tobacco    Never            Family History:     Family History   Problem Relation Age of Onset    C.A.D. Maternal Grandfather     Alcohol/Drug Maternal Grandfather          from alcoholism    Cerebrovascular Disease Maternal Grandfather     Coronary Artery Disease Maternal Grandfather     Prostate Cancer Maternal Grandfather     Lipids Father     Cancer Maternal Grandmother         Pancreatic    Cancer Paternal Grandmother         Lung, may not have been primary    Heart Disease Paternal Grandfather     Anxiety Disorder Sister     Anxiety Disorder Sister     LUNG DISEASE Maternal Uncle         Asthma versus sarcoid    Asthma Other     Diabetes No family hx of     Hypertension No family hx of     Breast Cancer No family hx of     Cancer - colorectal No family hx of     Connective Tissue Disorder No family hx of     Thyroid Disease No family hx of               Allergies:   No Known Allergies         Medications:     Current Outpatient Medications   Medication Sig Dispense Refill    albuterol (PROAIR HFA/PROVENTIL HFA/VENTOLIN HFA) 108 (90 Base) MCG/ACT inhaler Inhale 1-2 puffs into the lungs every 4 hours as needed for shortness of breath, wheezing or cough 18 g 2    amphetamine-dextroamphetamine (ADDERALL XR) 15 MG per 24 hr capsule TAKE ONE CAPSULE BY MOUTH EVERY MORNING PLEASE MAKE AN APPOINTMENT FOR FUTURE REFILLS  0    azelastine (ASTELIN) 0.1 % nasal spray USE 2 SPRAYS IN EACH NOSTRIL TWICE DAILY 90 mL 0    baclofen (LIORESAL) 10 MG tablet TAKE 1 TABLET(10 MG) BY MOUTH THREE TIMES DAILY AS NEEDED FOR MUSCLE SPASMS 90 tablet 3    cetirizine (ZYRTEC) 10 MG tablet Take 1 tablet (10 mg) by mouth At Bedtime 30 tablet 11    fluticasone-salmeterol (ADVAIR) 100-50 MCG/ACT inhaler Inhale 1 puff into the lungs every 12 hours 60 each 1    mometasone (NASONEX) 50 MCG/ACT nasal spray Spray 2  sprays into both nostrils daily 17 g 5    montelukast (SINGULAIR) 10 MG tablet Take 1 tablet (10 mg) by mouth at bedtime 30 tablet 5    venlafaxine (EFFEXOR XR) 150 MG 24 hr capsule Take 150 mg by mouth daily      tamsulosin (FLOMAX) 0.4 MG capsule Take 2 capsules (0.8 mg) by mouth every evening (Patient not taking: Reported on 9/5/2024) 180 capsule 3     No current facility-administered medications for this visit.             Review of Systems:     Reviewed and negative except as noted above          Physical Exam:   No data found.  There is no height or weight on file to calculate BMI.     General: age-appropriate appearing male in NAD  HEENT: Head AT/NC, EOMI, CN Grossly intact  Lungs: no respiratory distress, or pursed lip breathing  Heart: No obvious jugular venous distension present  Abdomen: non-distended  LE: no edema.   Musculoskeltal: extremities normal, no peripheral edema  Skin: no suspicious lesions or rashes  Neuro: Alert, oriented, speech and mentation normal;  moving all 4 extremities equally.  Psych: affect and mood normal          Data:   All laboratory data reviewed:     PSA PSA Tumor Marker   Latest Ref Rng 0 - 4 ug/L 0.00 - 3.50 ng/mL   9/23/2019 0.32     12/14/2023  1.07        Lab Results   Component Value Date    CR 0.91 12/14/2023    CR 0.90 10/28/2020             Impression and Plan:   Impression:   54-year-old man with LUTS      Plan:   LUTS  - We discussed the pathophysiology of the bladder and the prostate and the normal changes associated with the development of BPH and LUTS  - We discussed the mechanism of action of tamsulosin as well as the common side effects.  He did have bothersome lightheadedness and dizziness when working out.  We discussed a trial of alfuzosin given slightly less risk of orthostatic hypotension and prescription sent to the pharmacy  - We discussed the impacts of coffee and caffeine on the bladder  - Majority of his symptoms are outlet related, and so may not  have much of an impact with decreasing coffee  - Will plan for follow-up in 3 months for symptom check  - We discussed that if medication does not provide adequate improvement in his symptoms, we may need to consider further workup for bladder outlet procedure  - I reviewed his serum creatinine which is stable and normal  - I reviewed his PSA history which is nonconcerning     Thank you for the kind consultation.    Time spent: 20 minutes spent on the date of the encounter doing chart review, history and exam, documentation and further activities as noted above.    Carlito Delarosa MD   Urology  [facility]  [facility] Phone: [phone]  [facility] Phone: [phone]

## 2024-09-05 NOTE — NURSING NOTE
Conor Contreras's goals for this visit include:   Chief Complaint   Patient presents with    New Patient     Benign prostatic hyperplasia with urinary hesitancy       He requests these members of his care team be copied on today's visit information:       PCP: Rocky Yi    Referring Provider:  Rocky Yi [place]  [id number] [place] [address]    post void residual: 2 ml     Do you need any medication refills at today's visit?     Toshia Marie LPN on 9/5/2024 at 7:58 AM

## 2024-09-22 DIAGNOSIS — J45.20 MILD INTERMITTENT ASTHMA WITHOUT COMPLICATION: ICD-10-CM

## 2024-09-23 ENCOUNTER — E-VISIT (OUTPATIENT)
Dept: FAMILY MEDICINE | Facility: CLINIC | Age: 54
End: 2024-09-23
Payer: COMMERCIAL

## 2024-09-23 DIAGNOSIS — J30.89 SEASONAL ALLERGIC RHINITIS DUE TO OTHER ALLERGIC TRIGGER: ICD-10-CM

## 2024-09-23 PROCEDURE — 99421 OL DIG E/M SVC 5-10 MIN: CPT | Performed by: INTERNAL MEDICINE

## 2024-09-23 RX ORDER — MONTELUKAST SODIUM 10 MG/1
10 TABLET ORAL AT BEDTIME
Qty: 30 TABLET | Refills: 5 | Status: SHIPPED | OUTPATIENT
Start: 2024-09-23

## 2024-09-23 RX ORDER — FLUTICASONE PROPIONATE AND SALMETEROL 100; 50 UG/1; UG/1
1 POWDER RESPIRATORY (INHALATION) EVERY 12 HOURS
Qty: 60 EACH | Refills: 5 | Status: SHIPPED | OUTPATIENT
Start: 2024-09-23

## 2024-09-26 ENCOUNTER — E-VISIT (OUTPATIENT)
Dept: FAMILY MEDICINE | Facility: CLINIC | Age: 54
End: 2024-09-26
Payer: COMMERCIAL

## 2024-09-26 DIAGNOSIS — M54.42 LEFT-SIDED LOW BACK PAIN WITH LEFT-SIDED SCIATICA, UNSPECIFIED CHRONICITY: Primary | ICD-10-CM

## 2024-09-26 PROCEDURE — 99422 OL DIG E/M SVC 11-20 MIN: CPT | Performed by: INTERNAL MEDICINE

## 2024-10-01 DIAGNOSIS — J30.89 SEASONAL ALLERGIC RHINITIS DUE TO OTHER ALLERGIC TRIGGER: ICD-10-CM

## 2024-10-01 RX ORDER — AZELASTINE 1 MG/ML
2 SPRAY, METERED NASAL 2 TIMES DAILY
Qty: 90 ML | Refills: 1 | Status: SHIPPED | OUTPATIENT
Start: 2024-10-01

## 2024-10-10 ENCOUNTER — MYC REFILL (OUTPATIENT)
Dept: FAMILY MEDICINE | Facility: CLINIC | Age: 54
End: 2024-10-10

## 2024-10-10 ENCOUNTER — THERAPY VISIT (OUTPATIENT)
Dept: PHYSICAL THERAPY | Facility: CLINIC | Age: 54
End: 2024-10-10
Attending: INTERNAL MEDICINE
Payer: COMMERCIAL

## 2024-10-10 DIAGNOSIS — M54.42 LEFT-SIDED LOW BACK PAIN WITH LEFT-SIDED SCIATICA, UNSPECIFIED CHRONICITY: ICD-10-CM

## 2024-10-10 DIAGNOSIS — S46.812S STRAIN OF LEFT TRAPEZIUS MUSCLE, SEQUELA: ICD-10-CM

## 2024-10-10 DIAGNOSIS — M54.42 ACUTE LEFT-SIDED LOW BACK PAIN WITH LEFT-SIDED SCIATICA: Primary | ICD-10-CM

## 2024-10-10 PROCEDURE — 97161 PT EVAL LOW COMPLEX 20 MIN: CPT | Mod: GP | Performed by: PHYSICAL THERAPIST

## 2024-10-10 PROCEDURE — 97110 THERAPEUTIC EXERCISES: CPT | Mod: GP | Performed by: PHYSICAL THERAPIST

## 2024-10-10 NOTE — PROGRESS NOTES
PHYSICAL THERAPY EVALUATION  Type of Visit: Evaluation           Subjective       Presenting condition or subjective complaint: It started in my low back but it is now more in my hip, thigh, and knee or calf. Had covid so had stopped going to gym well resting then when back and did a leg workout but after his workout he was having a phantom pain in his left hip area that was hard to tell what it was. but overtimes has gradually gotten wores and worse.  Date of onset: 09/01/24    Relevant medical history: Asthma   Dates & types of surgery: none    Prior diagnostic imaging/testing results:       Prior therapy history for the same diagnosis, illness or injury: No      Prior Level of Function  Transfers:   Ambulation:   ADL:   IADL:     Living Environment  Social support: With a significant other or spouse   Type of home: House   Stairs to enter the home: No       Ramp: No   Stairs inside the home: Yes 12 Is there a railing: Yes     Help at home: None  Equipment owned:       Employment: Yes Mental Health Therapist  Hobbies/Interests: Hiking, biking, working out at gym    Patient goals for therapy: be able to walk dog without pain.eliminate exhauted feeling, stand up from couch without pain.    Pain assessment: Pain present     Objective   LUMBAR SPINE EVALUATION  PAIN: Pain Level at Rest: 1/10  Pain Level with Use: 7/10  Pain Location: left lateral hip , left thigh and lateral calf  Pain Quality: Aching, Shooting, and Throbbing  Pain Frequency: intermittent  Pain is Exacerbated By: evenings, walking / activity, getting up after prolonged standing, weight bearing.   Pain is Relieved By: rest, sleeping at night as it is best in the morning.   Pain Progression: Unchanged  INTEGUMENTARY (edema, incisions):   POSTURE:   GAIT:   Weightbearing Status:   Assistive Device(s):   Gait Deviations:   BALANCE/PROPRIOCEPTION:   WEIGHTBEARING ALIGNMENT:   NON-WEIGHTBEARING ALIGNMENT:    ROM:   (Degrees) Left AROM Left PROM  Right AROM  Right PROM   Hip Flexion 130 WNL with slight feeling of tightness  WNL no pain     Hip Extension       Hip Abduction       Hip Adduction       Hip Internal Rotation       Hip External Rotation 45  45    Knee Flexion 140  140    Knee Extension 0  0    Lumbar Side glide WNL no pain  WNL no pain    Lumbar Flexion Mild limitation no effect on pain.    Lumbar Extension Hands to mid lower leg no effect on pain,    Pain:   End feel:   Test Movements:   During: produces, abolishes, increases, decreases, no effect, centralizing, peripheralizing   After: better, worse, no better, no worse, no effect, centralized, peripheralized    Symptomatic response Mechanical response    During testing After testing Effect - increased ROM, decreased ROM, or key functional test No Effect   Pretest symptoms standin/10lateral calf pain     Rep FIS No Effect    No Effect        Rep EIS No Effect    No Effect          Pretest symptoms lyin/10 lateral left calf pain    During testing After testing Effect - increased ROM, decreased ROM, or key functional test No Effect   Rep WM No Effect    No Effect        Rep EIL No Effect    No Effect          If required, pretest symptoms: 1/10 lateral left calf pain    During testing After testing Effect - increased ROM, decreased ROM, or key functional test No Effect   Rep SGIS - R No Effect    No Effect        Rep SGIS - L No Effect    No Effect          MYOTOMES: WNL slight  hip flexion on left 4+/5 weakness. PF 5/5, DF 5/5. Able to heel and toe walk .   DTR S:   CORD SIGNS:   DERMATOMES:  no report of numbness / tingling.   NEURAL TENSION:    Left Right   SLR Negative  Negative    SLR with DF     Femoral Nerve     Slump Positive Negative    Isreal (Lumbar)     Isreal (Thoracic)     Isreal (Cervical)     Median     Ulnar     Radial        FLEXIBILITY:   LUMBAR/HIP Special Tests: WNL   PELVIS/SI SPECIAL TESTS:   FUNCTIONAL TESTS:   PALPATION:  + pain left lateral gastroc muscle belly.   SPINAL SEGMENTAL  CONCLUSIONS:       Assessment & Plan   CLINICAL IMPRESSIONS  Medical Diagnosis: Left-sided low back pain with left-sided sciatica, unspecified chronicity    Treatment Diagnosis: low back pain / left leg pain   Impression/Assessment: Patient is a 54 year old male with left leg pain complaints.  The following significant findings have been identified: Pain, Decreased ROM/flexibility, Decreased strength, and Decreased activity tolerance. These impairments interfere with their ability to perform self care tasks, recreational activities, and household chores as compared to previous level of function.     Clinical Decision Making (Complexity):  Clinical Presentation: Stable/Uncomplicated  Clinical Presentation Rationale: based on medical and personal factors listed in PT evaluation  Clinical Decision Making (Complexity): Low complexity    PLAN OF CARE  Treatment Interventions:  Interventions: Manual Therapy, Neuromuscular Re-education, Therapeutic Activity, Therapeutic Exercise, Self-Care/Home Management    Long Term Goals     PT Goal 1  Goal Identifier: standing  Goal Description: pt will be able to stand for 1 hour painfree  Rationale: to maximize safety and independence with performance of ADLs and functional tasks  Goal Progress: 1 hour of standing / weight bearing and pain increase in left lateral leg up to 5/10 PL  Target Date: 12/05/24      Frequency of Treatment: 1 x/ week  Duration of Treatment: 8 weeks    Recommended Referrals to Other Professionals:   Education Assessment:   Learner/Method: Patient;Demonstration;Pictures/Video;No Barriers to Learning    Risks and benefits of evaluation/treatment have been explained.   Patient/Family/caregiver agrees with Plan of Care.     Evaluation Time:     PT Eval, Low Complexity Minutes (90072): 20   Present: Not applicable     Signing Clinician: Bryan Ayala, PT

## 2024-10-11 RX ORDER — BACLOFEN 10 MG/1
10 TABLET ORAL 3 TIMES DAILY PRN
Qty: 90 TABLET | Refills: 3 | Status: SHIPPED | OUTPATIENT
Start: 2024-10-11

## 2024-10-14 ENCOUNTER — E-VISIT (OUTPATIENT)
Dept: FAMILY MEDICINE | Facility: CLINIC | Age: 54
End: 2024-10-14
Payer: COMMERCIAL

## 2024-10-14 DIAGNOSIS — M54.40 ACUTE BILATERAL LOW BACK PAIN WITH SCIATICA, SCIATICA LATERALITY UNSPECIFIED: Primary | ICD-10-CM

## 2024-10-14 PROCEDURE — 99422 OL DIG E/M SVC 11-20 MIN: CPT | Performed by: INTERNAL MEDICINE

## 2024-10-14 RX ORDER — PREDNISONE 20 MG/1
40 TABLET ORAL EVERY MORNING
Qty: 10 TABLET | Refills: 1 | Status: SHIPPED | OUTPATIENT
Start: 2024-10-14

## 2024-11-07 ENCOUNTER — THERAPY VISIT (OUTPATIENT)
Dept: PHYSICAL THERAPY | Facility: CLINIC | Age: 54
End: 2024-11-07
Payer: COMMERCIAL

## 2024-11-07 DIAGNOSIS — M54.42 ACUTE LEFT-SIDED LOW BACK PAIN WITH LEFT-SIDED SCIATICA: Primary | ICD-10-CM

## 2024-11-07 PROCEDURE — 97110 THERAPEUTIC EXERCISES: CPT | Mod: GP | Performed by: PHYSICAL THERAPIST

## 2024-11-12 ENCOUNTER — MYC REFILL (OUTPATIENT)
Dept: FAMILY MEDICINE | Facility: CLINIC | Age: 54
End: 2024-11-12

## 2024-11-12 DIAGNOSIS — M54.40 ACUTE BILATERAL LOW BACK PAIN WITH SCIATICA, SCIATICA LATERALITY UNSPECIFIED: ICD-10-CM

## 2024-11-14 RX ORDER — PREDNISONE 20 MG/1
40 TABLET ORAL EVERY MORNING
Qty: 10 TABLET | Refills: 1 | Status: SHIPPED | OUTPATIENT
Start: 2024-11-14

## 2024-11-19 ENCOUNTER — TRANSFERRED RECORDS (OUTPATIENT)
Dept: HEALTH INFORMATION MANAGEMENT | Facility: CLINIC | Age: 54
End: 2024-11-19
Payer: COMMERCIAL

## 2024-11-22 ENCOUNTER — TRANSFERRED RECORDS (OUTPATIENT)
Dept: HEALTH INFORMATION MANAGEMENT | Facility: CLINIC | Age: 54
End: 2024-11-22
Payer: COMMERCIAL

## 2024-11-30 DIAGNOSIS — J30.89 SEASONAL ALLERGIC RHINITIS DUE TO OTHER ALLERGIC TRIGGER: ICD-10-CM

## 2024-12-02 ENCOUNTER — E-VISIT (OUTPATIENT)
Dept: FAMILY MEDICINE | Facility: CLINIC | Age: 54
End: 2024-12-02
Payer: COMMERCIAL

## 2024-12-02 ENCOUNTER — MYC REFILL (OUTPATIENT)
Dept: FAMILY MEDICINE | Facility: CLINIC | Age: 54
End: 2024-12-02

## 2024-12-02 DIAGNOSIS — J30.89 SEASONAL ALLERGIC RHINITIS DUE TO OTHER ALLERGIC TRIGGER: ICD-10-CM

## 2024-12-02 DIAGNOSIS — M54.40 ACUTE BILATERAL LOW BACK PAIN WITH SCIATICA, SCIATICA LATERALITY UNSPECIFIED: ICD-10-CM

## 2024-12-02 RX ORDER — MOMETASONE FUROATE MONOHYDRATE 50 UG/1
2 SPRAY, METERED NASAL DAILY
Qty: 17 G | Refills: 5 | OUTPATIENT
Start: 2024-12-02

## 2024-12-02 RX ORDER — PREDNISONE 20 MG/1
40 TABLET ORAL EVERY MORNING
Qty: 10 TABLET | Refills: 0 | Status: SHIPPED | OUTPATIENT
Start: 2024-12-02

## 2024-12-02 RX ORDER — MOMETASONE FUROATE MONOHYDRATE 50 UG/1
2 SPRAY, METERED NASAL DAILY
Qty: 17 G | Refills: 1 | Status: SHIPPED | OUTPATIENT
Start: 2024-12-02

## 2024-12-05 ENCOUNTER — OFFICE VISIT (OUTPATIENT)
Dept: FAMILY MEDICINE | Facility: CLINIC | Age: 54
End: 2024-12-05
Attending: PHYSICIAN ASSISTANT
Payer: COMMERCIAL

## 2024-12-05 VITALS
HEART RATE: 77 BPM | BODY MASS INDEX: 28.42 KG/M2 | OXYGEN SATURATION: 97 % | SYSTOLIC BLOOD PRESSURE: 121 MMHG | WEIGHT: 203 LBS | DIASTOLIC BLOOD PRESSURE: 77 MMHG | RESPIRATION RATE: 16 BRPM | HEIGHT: 71 IN | TEMPERATURE: 97.2 F

## 2024-12-05 DIAGNOSIS — Z13.6 SCREENING, ISCHEMIC HEART DISEASE: ICD-10-CM

## 2024-12-05 DIAGNOSIS — Z12.11 SCREEN FOR COLON CANCER: ICD-10-CM

## 2024-12-05 DIAGNOSIS — Z12.5 SCREENING FOR PROSTATE CANCER: ICD-10-CM

## 2024-12-05 DIAGNOSIS — J84.9 ILD (INTERSTITIAL LUNG DISEASE) (H): ICD-10-CM

## 2024-12-05 DIAGNOSIS — E78.5 HYPERLIPIDEMIA LDL GOAL <160: ICD-10-CM

## 2024-12-05 DIAGNOSIS — Z00.00 ROUTINE GENERAL MEDICAL EXAMINATION AT A HEALTH CARE FACILITY: Primary | ICD-10-CM

## 2024-12-05 DIAGNOSIS — Z91.09 ALLERGY TO ENVIRONMENTAL FACTORS: ICD-10-CM

## 2024-12-05 LAB
ALBUMIN SERPL BCG-MCNC: 4.4 G/DL (ref 3.5–5.2)
ALP SERPL-CCNC: 118 U/L (ref 40–150)
ALT SERPL W P-5'-P-CCNC: 39 U/L (ref 0–70)
ANION GAP SERPL CALCULATED.3IONS-SCNC: 8 MMOL/L (ref 7–15)
AST SERPL W P-5'-P-CCNC: 21 U/L (ref 0–45)
BASOPHILS # BLD AUTO: 0 10E3/UL (ref 0–0.2)
BASOPHILS NFR BLD AUTO: 0 %
BILIRUB SERPL-MCNC: 0.4 MG/DL
BUN SERPL-MCNC: 24 MG/DL (ref 6–20)
CALCIUM SERPL-MCNC: 9.9 MG/DL (ref 8.8–10.4)
CHLORIDE SERPL-SCNC: 100 MMOL/L (ref 98–107)
CHOLEST SERPL-MCNC: 235 MG/DL
CREAT SERPL-MCNC: 0.81 MG/DL (ref 0.67–1.17)
EGFRCR SERPLBLD CKD-EPI 2021: >90 ML/MIN/1.73M2
EOSINOPHIL # BLD AUTO: 0 10E3/UL (ref 0–0.7)
EOSINOPHIL NFR BLD AUTO: 0 %
ERYTHROCYTE [DISTWIDTH] IN BLOOD BY AUTOMATED COUNT: 12.5 % (ref 10–15)
FASTING STATUS PATIENT QL REPORTED: NO
FASTING STATUS PATIENT QL REPORTED: NO
GLUCOSE SERPL-MCNC: 100 MG/DL (ref 70–99)
HCO3 SERPL-SCNC: 30 MMOL/L (ref 22–29)
HCT VFR BLD AUTO: 40.4 % (ref 40–53)
HDLC SERPL-MCNC: 69 MG/DL
HGB BLD-MCNC: 13.7 G/DL (ref 13.3–17.7)
IMM GRANULOCYTES # BLD: 0 10E3/UL
IMM GRANULOCYTES NFR BLD: 0 %
LDLC SERPL CALC-MCNC: 148 MG/DL
LYMPHOCYTES # BLD AUTO: 0.9 10E3/UL (ref 0.8–5.3)
LYMPHOCYTES NFR BLD AUTO: 8 %
MCH RBC QN AUTO: 30.6 PG (ref 26.5–33)
MCHC RBC AUTO-ENTMCNC: 33.9 G/DL (ref 31.5–36.5)
MCV RBC AUTO: 90 FL (ref 78–100)
MONOCYTES # BLD AUTO: 0.4 10E3/UL (ref 0–1.3)
MONOCYTES NFR BLD AUTO: 4 %
NEUTROPHILS # BLD AUTO: 9.6 10E3/UL (ref 1.6–8.3)
NEUTROPHILS NFR BLD AUTO: 88 %
NONHDLC SERPL-MCNC: 166 MG/DL
PLATELET # BLD AUTO: 166 10E3/UL (ref 150–450)
POTASSIUM SERPL-SCNC: 4.2 MMOL/L (ref 3.4–5.3)
PROT SERPL-MCNC: 7.8 G/DL (ref 6.4–8.3)
PSA SERPL DL<=0.01 NG/ML-MCNC: 0.82 NG/ML (ref 0–3.5)
RBC # BLD AUTO: 4.48 10E6/UL (ref 4.4–5.9)
SODIUM SERPL-SCNC: 138 MMOL/L (ref 135–145)
TRIGL SERPL-MCNC: 88 MG/DL
WBC # BLD AUTO: 11 10E3/UL (ref 4–11)

## 2024-12-05 PROCEDURE — 36415 COLL VENOUS BLD VENIPUNCTURE: CPT | Performed by: INTERNAL MEDICINE

## 2024-12-05 PROCEDURE — 99396 PREV VISIT EST AGE 40-64: CPT | Performed by: INTERNAL MEDICINE

## 2024-12-05 PROCEDURE — 86003 ALLG SPEC IGE CRUDE XTRC EA: CPT | Performed by: INTERNAL MEDICINE

## 2024-12-05 PROCEDURE — 85025 COMPLETE CBC W/AUTO DIFF WBC: CPT | Performed by: INTERNAL MEDICINE

## 2024-12-05 PROCEDURE — 80053 COMPREHEN METABOLIC PANEL: CPT | Performed by: INTERNAL MEDICINE

## 2024-12-05 PROCEDURE — G0103 PSA SCREENING: HCPCS | Performed by: INTERNAL MEDICINE

## 2024-12-05 PROCEDURE — 80061 LIPID PANEL: CPT | Performed by: INTERNAL MEDICINE

## 2024-12-05 SDOH — HEALTH STABILITY: PHYSICAL HEALTH: ON AVERAGE, HOW MANY MINUTES DO YOU ENGAGE IN EXERCISE AT THIS LEVEL?: 30 MIN

## 2024-12-05 SDOH — HEALTH STABILITY: PHYSICAL HEALTH: ON AVERAGE, HOW MANY DAYS PER WEEK DO YOU ENGAGE IN MODERATE TO STRENUOUS EXERCISE (LIKE A BRISK WALK)?: 2 DAYS

## 2024-12-05 ASSESSMENT — ASTHMA QUESTIONNAIRES
QUESTION_4 LAST FOUR WEEKS HOW OFTEN HAVE YOU USED YOUR RESCUE INHALER OR NEBULIZER MEDICATION (SUCH AS ALBUTEROL): NOT AT ALL
QUESTION_3 LAST FOUR WEEKS HOW OFTEN DID YOUR ASTHMA SYMPTOMS (WHEEZING, COUGHING, SHORTNESS OF BREATH, CHEST TIGHTNESS OR PAIN) WAKE YOU UP AT NIGHT OR EARLIER THAN USUAL IN THE MORNING: NOT AT ALL
ACT_TOTALSCORE: 24
QUESTION_5 LAST FOUR WEEKS HOW WOULD YOU RATE YOUR ASTHMA CONTROL: WELL CONTROLLED
ACT_TOTALSCORE: 24
QUESTION_1 LAST FOUR WEEKS HOW MUCH OF THE TIME DID YOUR ASTHMA KEEP YOU FROM GETTING AS MUCH DONE AT WORK, SCHOOL OR AT HOME: NONE OF THE TIME
QUESTION_2 LAST FOUR WEEKS HOW OFTEN HAVE YOU HAD SHORTNESS OF BREATH: NOT AT ALL

## 2024-12-05 ASSESSMENT — SOCIAL DETERMINANTS OF HEALTH (SDOH): HOW OFTEN DO YOU GET TOGETHER WITH FRIENDS OR RELATIVES?: ONCE A WEEK

## 2024-12-05 ASSESSMENT — PAIN SCALES - GENERAL: PAINLEVEL_OUTOF10: MODERATE PAIN (4)

## 2024-12-05 NOTE — PROGRESS NOTES
Preventive Care Visit  [facility]  Rocky Yi MD, Internal Medicine  Dec 5, 2024  {Provider  Link to SmartSet :687720}    {PROVIDER CHARTING PREFERENCE:747620}    Tessa Couch is a 54 year old, presenting for the following:  Physical        12/5/2024     8:05 AM   Additional Questions   Roomed by you   Accompanied by self          HPI  ***  {MA/LPN/RN Pre-Provider Visit Orders- hCG/UA/Strep (Optional):311890}  {SUPERLIST (Optional):040669}  {additonal problems for provider to add (Optional):279018}  Health Care Directive  Patient does not have a Health Care Directive: {ADVANCE_DIRECTIVE_STATUS:123978}      12/5/2024   General Health   How would you rate your overall physical health? Good   Feel stress (tense, anxious, or unable to sleep) Only a little      (!) STRESS CONCERN      12/5/2024   Nutrition   Three or more servings of calcium each day? (!) I DON'T KNOW   Diet: Regular (no restrictions)   How many servings of fruit and vegetables per day? (!) 2-3   How many sweetened beverages each day? 0-1            12/5/2024   Exercise   Days per week of moderate/strenous exercise 2 days   Average minutes spent exercising at this level 30 min      (!) EXERCISE CONCERN      12/5/2024   Social Factors   Frequency of gathering with friends or relatives Once a week   Worry food won't last until get money to buy more No   Food not last or not have enough money for food? No   Do you have housing? (Housing is defined as stable permanent housing and does not include staying ouside in a car, in a tent, in an abandoned building, in an overnight shelter, or couch-surfing.) Yes   Are you worried about losing your housing? No   Lack of transportation? No   Unable to get utilities (heat,electricity)? No            12/5/2024   Fall Risk   Fallen 2 or more times in the past year? No    Trouble with walking or balance? Yes    Gait Speed Test (Document in seconds) 6.01   Gait Speed Test  Interpretation Greater than 5.01 seconds - ABNORMAL       Patient-reported          2024   Dental   Dentist two times every year? Yes            2024   TB Screening   Were you born outside of the [place]? No          Today's PHQ-9 Score:       2024     6:27 AM   PHQ-9 SCORE   PHQ-9 Total Score MyChart 0   PHQ-9 Total Score 0        Patient-reported         2024   Substance Use   Alcohol more than 3/day or more than 7/wk No   Do you use any other substances recreationally? No        Social History     Tobacco Use    Smoking status: Former     Current packs/day: 0.00     Average packs/day: 0.5 packs/day for 10.0 years (5.0 ttl pk-yrs)     Types: Cigarettes     Start date: 3/18/1991     Quit date: 3/18/2001     Years since quittin.7     Passive exposure: Never    Smokeless tobacco: Never    Tobacco comments:     QUIT 9 YEARS AGO   Vaping Use    Vaping status: Never Used   Substance Use Topics    Alcohol use: Yes     Comment: 6 to 8 beers per week    Drug use: No     Comment: h/o drugs, denies past 13 yrs     {Provider  If there are gaps in the social history shown above, please follow the link to update and then refresh the note Link to Social and Substance History :058769}      2024   STI Screening   New sexual partner(s) since last STI/HIV test? No      Last PSA:   PSA   Date Value Ref Range Status   2019 0.32 0 - 4 ug/L Final     Comment:     Assay Method:  Chemiluminescence using Siemens Vista analyzer     Prostate Specific Antigen Screen   Date Value Ref Range Status   2023 1.07 0.00 - 3.50 ng/mL Final     ASCVD Risk   The 10-year ASCVD risk score (Guillermo MAYS, et al., 2019) is: 5.1%    Values used to calculate the score:      Age: 54 years      Sex: Male      Is Non- : No      Diabetic: No      Tobacco smoker: No      Systolic Blood Pressure: 121 mmHg      Is BP treated: No      HDL Cholesterol: 45 mg/dL      Total Cholesterol: 184  "mg/dL    {Link to Fracture Risk Assessment Tool (Optional):497210}    {Provider  REQUIRED FOR AWV Use the storyboard to review patient history, after sections have been marked as reviewed, refresh note to capture documentation:102879}   Reviewed and updated as needed this visit by Provider                    {HISTORY OPTIONS (Optional):075989}    {ROS Picklists (Optional):592788}     Objective    Exam  /77 (BP Location: Left arm, Patient Position: Chair, Cuff Size: Adult Regular)   Pulse 77   Temp 97.2  F (36.2  C) (Temporal)   Resp 16   Ht 1.797 m (5' 10.75\")   Wt 92.1 kg (203 lb)   SpO2 97%   BMI 28.51 kg/m     Estimated body mass index is 28.51 kg/m  as calculated from the following:    Height as of this encounter: 1.797 m (5' 10.75\").    Weight as of this encounter: 92.1 kg (203 lb).    Physical Exam  {Exam Choices (Optional):522289}        Signed Electronically by: Rocky Yi MD  {Email feedback regarding this note to [email or web address]   :720537}  "

## 2024-12-05 NOTE — NURSING NOTE
Patient Quality Outreach    Patient is due for the following:   Asthma  -  AAP  Depression  -  DAP    Action(s) Taken:   Patient has upcoming appointment, these items will be addressed at that time.    Type of outreach:    Chart review performed, no outreach needed.    Questions for provider review:    None           Becky Milan MA        decreased strength

## 2024-12-05 NOTE — PATIENT INSTRUCTIONS
At [facility], we strive to deliver an exceptional experience to you, every time we see you. If you receive a survey, please let us know what we are doing well and/or what we could improve upon, as we do value your feedback.  If you have MyChart, you can expect to receive results automatically within 24 hours of their completion.  Your provider will send a note interpreting your results as well.   If you do not have MyChart, you should receive your results in about a week by mail.    Your care team:                            Family Medicine Internal Medicine   MD Rocky Rodriguez, MD Cherry Lujan, MD Heron Kaur, MD Danelle Díaz, PADatC    Eusebio Birmingham, MD Pediatrics   Janice Smith, MD Savita Crawford, MD Traci Fleming, APRN CNP Nargis Yousif APRN CNP   Evgeny Escalante, MD Rosalinda Rutledge, MD Caroline Deleon, CNP     Doc Whitaker, CNP Same-Day Provider (No follow-up visits)   GURINDER Moore, DNP Adrianna Smith, PA-C   GURINDER Melendez, FNP, BC VEDA MosesC     Clinic hours: Monday - Thursday 7 am-6 pm; Fridays 7 am-5 pm.   Urgent care: Monday - Friday 10 am- 8 pm; Saturday and Sunday 9 am-5 pm.    Clinic: [phone]       [facility]: Monday - Thursday 8 am - 7 pm; Friday 8 am - 6 pm  [facility]: [phone]    Patient Education   Preventive Care Advice   This is general advice given by our system to help you stay healthy. However, your care team may have specific advice just for you. Please talk to your care team about your preventive care needs.  Nutrition  Eat 5 or more servings of fruits and vegetables each day.  Try wheat bread, brown rice and whole grain pasta (instead of white bread, rice, and pasta).  Get enough calcium and vitamin D. Check the label on foods and aim for 100% of the RDA (recommended daily allowance).  Lifestyle  Exercise at least 150  minutes each week  (30 minutes a day, 5 days a week).  Do muscle strengthening activities 2 days a week. These help control your weight and prevent disease.  No smoking.  Wear sunscreen to prevent skin cancer.  Have a dental exam and cleaning every 6 months.  Yearly exams  See your health care team every year to talk about:  Any changes in your health.  Any medicines your care team has prescribed.  Preventive care, family planning, and ways to prevent chronic diseases.  Shots (vaccines)   HPV shots (up to age 26), if you've never had them before.  Hepatitis B shots (up to age 59), if you've never had them before.  COVID-19 shot: Get this shot when it's due.  Flu shot: Get a flu shot every year.  Tetanus shot: Get a tetanus shot every 10 years.  Pneumococcal, hepatitis A, and RSV shots: Ask your care team if you need these based on your risk.  Shingles shot (for age 50 and up)  General health tests  Diabetes screening:  Starting at age 35, Get screened for diabetes at least every 3 years.  If you are younger than age 35, ask your care team if you should be screened for diabetes.  Cholesterol test: At age 39, start having a cholesterol test every 5 years, or more often if advised.  Bone density scan (DEXA): At age 50, ask your care team if you should have this scan for osteoporosis (brittle bones).  Hepatitis C: Get tested at least once in your life.  STIs (sexually transmitted infections)  Before age 24: Ask your care team if you should be screened for STIs.  After age 24: Get screened for STIs if you're at risk. You are at risk for STIs (including HIV) if:  You are sexually active with more than one person.  You don't use condoms every time.  You or a partner was diagnosed with a sexually transmitted infection.  If you are at risk for HIV, ask about PrEP medicine to prevent HIV.  Get tested for HIV at least once in your life, whether you are at risk for HIV or not.  Cancer screening tests  Cervical cancer screening:  If you have a cervix, begin getting regular cervical cancer screening tests starting at age 21.  Breast cancer scan (mammogram): If you've ever had breasts, begin having regular mammograms starting at age 40. This is a scan to check for breast cancer.  Colon cancer screening: It is important to start screening for colon cancer at age 45.  Have a colonoscopy test every 10 years (or more often if you're at risk) Or, ask your provider about stool tests like a FIT test every year or Cologuard test every 3 years.  To learn more about your testing options, visit:   .  For help making a decision, visit:   https://bit.ly/ab04659.  Prostate cancer screening test: If you have a prostate, ask your care team if a prostate cancer screening test (PSA) at age 55 is right for you.  Lung cancer screening: If you are a current or former smoker ages 50 to 80, ask your care team if ongoing lung cancer screenings are right for you.  For informational purposes only. Not to replace the advice of your health care provider. Copyright   2023 [employer]. All rights reserved. Clinically reviewed by the  [employer]. [employer] [id number] - REV 01/24.  Preventing Falls: Care Instructions  Injuries and health problems such as trouble walking or poor eyesight can increase your risk of falling. So can some medicines. But there are things you can do to help prevent falls. You can exercise to get stronger. You can also arrange your home to make it safer.    Talk to your doctor about the medicines you take. Ask if any of them increase the risk of falls and whether they can be changed or stopped.   Try to exercise regularly. It can help improve your strength and balance. This can help lower your risk of falling.         Practice fall safety and prevention.   Wear low-heeled shoes that fit well and give your feet good support. Talk to your doctor if you have foot problems that make this hard.  Carry a cellphone or  "wear a medical alert device that you can use to call for help.  Use stepladders instead of chairs to reach high objects. Don't climb if you're at risk for falls. Ask for help, if needed.  Wear the correct eyeglasses, if you need them.        Make your home safer.   Remove rugs, cords, clutter, and furniture from walkways.  Keep your house well lit. Use night-lights in hallways and bathrooms.  Install and use sturdy handrails on stairways.  Wear nonskid footwear, even inside. Don't walk barefoot or in socks without shoes.        Be safe outside.   Use handrails, curb cuts, and ramps whenever possible.  Keep your hands free by using a shoulder bag or backpack.  Try to walk in well-lit areas. Watch out for uneven ground, changes in pavement, and debris.  Be careful in the winter. Walk on the grass or gravel when sidewalks are slippery. Use de-icer on steps and walkways. Add non-slip devices to shoes.    Put grab bars and nonskid mats in your shower or tub and near the toilet. Try to use a shower chair or bath bench when bathing.   Get into a tub or shower by putting in your weaker leg first. Get out with your strong side first. Have a phone or medical alert device in the bathroom with you.   Where can you learn more?  Go to https://www.[facility].net/patiented  Enter G117 in the search box to learn more about \"Preventing Falls: Care Instructions.\"  Current as of: July 17, 2023  Content Version: 14.2 2024 [facility].   Care instructions adapted under license by your healthcare professional. If you have questions about a medical condition or this instruction, always ask your healthcare professional. [employer] disclaims any warranty or liability for your use of this information.       "

## 2024-12-09 ENCOUNTER — PATIENT OUTREACH (OUTPATIENT)
Dept: CARE COORDINATION | Facility: CLINIC | Age: 54
End: 2024-12-09

## 2024-12-11 ENCOUNTER — PATIENT OUTREACH (OUTPATIENT)
Dept: CARE COORDINATION | Facility: CLINIC | Age: 54
End: 2024-12-11
Payer: COMMERCIAL

## 2024-12-11 LAB
A ALTERNATA IGE QN: <0.1 KU(A)/L
A FUMIGATUS IGE QN: <0.1 KU(A)/L
C HERBARUM IGE QN: <0.1 KU(A)/L
CALIF WALNUT POLN IGE QN: <0.1 KU(A)/L
CAT DANDER IGG QN: <0.1 KU(A)/L
CEDAR IGE QN: <0.1 KU(A)/L
COTTONWOOD IGE QN: <0.1 KU(A)/L
D FARINAE IGE QN: <0.1 KU(A)/L
D PTERONYSS IGE QN: <0.1 KU(A)/L
DOG DANDER+EPITH IGE QN: <0.1 KU(A)/L
E PURPURASCENS IGE QN: <0.1 KU(A)/L
ENGL PLANTAIN IGE QN: <0.1 KU(A)/L
FIREBUSH IGE QN: <0.1 KU(A)/L
GIANT RAGWEED IGE QN: <0.1 KU(A)/L
JOHNSON GRASS IGE QN: <0.1 KU(A)/L
NETTLE IGE QN: <0.1 KU(A)/L
SALTWORT IGE QN: <0.1 KU(A)/L
SHEEP SORREL IGE QN: <0.1 KU(A)/L
SILVER BIRCH IGE QN: <0.1 KU(A)/L
TIMOTHY IGE QN: <0.1 KU(A)/L
WHITE ASH IGE QN: <0.1 KU(A)/L
WHITE ELM IGE QN: <0.1 KU(A)/L
WHITE MULBERRY IGE QN: <0.1 KU(A)/L
WORMWOOD IGE QN: <0.1 KU(A)/L

## 2024-12-12 LAB
COCKSFOOT IGE QN: <0.1 KU(A)/L
COMMON RAGWEED IGE QN: <0.1 KU(A)/L
EAST WHITE PINE IGE QN: <0.1 KU(A)/L
GOOSEFOOT IGE QN: <0.1 KU(A)/L
MAPLE IGE QN: <0.1 KU(A)/L
MUGWORT IGE QN: <0.1 KU(A)/L
P NOTATUM IGE QN: <0.1 KU(A)/L
RED MULBERRY IGE QN: <0.1 KU(A)/L
WHITE OAK IGE QN: <0.1 KU(A)/L

## 2024-12-15 NOTE — PROGRESS NOTES
SUBJECTIVE:   Khoa is a 54 year old male who presents today for an physical exam.    Health Care Directive  Patient does not have a Health Care Directive: Not discussed during today's visit.        12/5/2024   General Health   How would you rate your overall physical health? Good   Feel stress (tense, anxious, or unable to sleep) Only a little      (!) STRESS CONCERN      12/5/2024   Nutrition   Three or more servings of calcium each day? (!) I DON'T KNOW   Diet: Regular (no restrictions)   How many servings of fruit and vegetables per day? (!) 2-3   How many sweetened beverages each day? 0-1            12/5/2024   Exercise   Days per week of moderate/strenous exercise 2 days   Average minutes spent exercising at this level 30 min      (!) EXERCISE CONCERN      12/5/2024   Social Factors   Frequency of gathering with friends or relatives Once a week   Worry food won't last until get money to buy more No   Food not last or not have enough money for food? No   Do you have housing? (Housing is defined as stable permanent housing and does not include staying ouside in a car, in a tent, in an abandoned building, in an overnight shelter, or couch-surfing.) Yes   Are you worried about losing your housing? No   Lack of transportation? No   Unable to get utilities (heat,electricity)? No            12/5/2024   Fall Risk   Fallen 2 or more times in the past year? No    Trouble with walking or balance? Yes    Gait Speed Test (Document in seconds) 6.01   Gait Speed Test Interpretation Greater than 5.01 seconds - ABNORMAL       Patient-reported          12/5/2024   Dental   Dentist two times every year? Yes            12/5/2024   TB Screening   Were you born outside of the [place]? No          Today's PHQ-9 Score:       12/5/2024     6:27 AM   PHQ-9 SCORE   PHQ-9 Total Score MyChart 0   PHQ-9 Total Score 0        Patient-reported         12/5/2024   Substance Use   Alcohol more than 3/day or more than 7/wk No   Do you use any  other substances recreationally? No        Social History     Tobacco Use    Smoking status: Former     Current packs/day: 0.00     Average packs/day: 0.5 packs/day for 10.0 years (5.0 ttl pk-yrs)     Types: Cigarettes     Start date: 3/18/1991     Quit date: 3/18/2001     Years since quittin.7     Passive exposure: Never    Smokeless tobacco: Never    Tobacco comments:     QUIT 9 YEARS AGO   Vaping Use    Vaping status: Never Used   Substance Use Topics    Alcohol use: Yes     Comment: 6 to 8 beers per week    Drug use: No     Comment: h/o drugs, denies past 13 yrs           2024   STI Screening   New sexual partner(s) since last STI/HIV test? No      Last PSA:   PSA   Date Value Ref Range Status   2019 0.32 0 - 4 ug/L Final     Comment:     Assay Method:  Chemiluminescence using Siemens Vista analyzer     Prostate Specific Antigen Screen   Date Value Ref Range Status   2023 1.07 0.00 - 3.50 ng/mL Final     ASCVD Risk   The 10-year ASCVD risk score (Guillermo MAYS, et al., 2019) is: 5.1%    Values used to calculate the score:      Age: 54 years      Sex: Male      Is Non- : No      Diabetic: No      Tobacco smoker: No      Systolic Blood Pressure: 121 mmHg      Is BP treated: No      HDL Cholesterol: 45 mg/dL      Total Cholesterol: 184 mg/dL      Labs reviewed in EPIC  BP Readings from Last 3 Encounters:   24 121/77   24 120/77   23 127/85    Wt Readings from Last 3 Encounters:   24 92.1 kg (203 lb)   24 94.8 kg (209 lb)   24 94.2 kg (207 lb 9.6 oz)                  Patient Active Problem List   Diagnosis    ADD (attention deficit disorder)    HARLAN (obstructive sleep apnea)    Hyperlipidemia LDL goal <160    Generalized anxiety disorder    Benign skin lesion of face    Dysthymia    Allergic rhinitis    Abnormal CXR perihilar nodes on CT chest    Palpitations    Mediastinal lymphadenopathy    Cervical myofascial strain, sequela     Chronic pain of left knee    Pulmonary nodules    Benign prostatic hyperplasia with urinary hesitancy    Acute left-sided low back pain with left-sided sciatica    ILD (interstitial lung disease) (H)     Past Surgical History:   Procedure Laterality Date    ENDOBRONCHIAL ULTRASOUND FLEXIBLE N/A 2015    Procedure: ENDOBRONCHIAL ULTRASOUND FLEXIBLE;  Surgeon: Ramakrishna Williamson MD;  Location: [facility]    VASECTOMY         Social History     Tobacco Use    Smoking status: Former     Current packs/day: 0.00     Average packs/day: 0.5 packs/day for 10.0 years (5.0 ttl pk-yrs)     Types: Cigarettes     Start date: 3/18/1991     Quit date: 3/18/2001     Years since quittin.7     Passive exposure: Never    Smokeless tobacco: Never    Tobacco comments:     QUIT 9 YEARS AGO   Substance Use Topics    Alcohol use: Yes     Comment: 6 to 8 beers per week     Family History   Problem Relation Age of Onset    C.A.D. Maternal Grandfather     Alcohol/Drug Maternal Grandfather          from alcoholism    Cerebrovascular Disease Maternal Grandfather     Coronary Artery Disease Maternal Grandfather     Prostate Cancer Maternal Grandfather     Lipids Father     Cancer Maternal Grandmother         Pancreatic    Cancer Paternal Grandmother         Lung, may not have been primary    Heart Disease Paternal Grandfather     Anxiety Disorder Sister     Anxiety Disorder Sister     LUNG DISEASE Maternal Uncle         Asthma versus sarcoid    Asthma Other     Diabetes No family hx of     Hypertension No family hx of     Breast Cancer No family hx of     Cancer - colorectal No family hx of     Connective Tissue Disorder No family hx of     Thyroid Disease No family hx of          No Known Allergies         Reviewed and updated as needed this visit by clinical staff   Tobacco  Allergies  Meds              Reviewed and updated as needed this visit by Provider                    Review of Systems  CONSTITUTIONAL: NEGATIVE for  "fever, chills, change in weight  INTEGUMENTARY/SKIN: NEGATIVE for worrisome rashes, moles or lesions  EYES: NEGATIVE for vision changes or irritation  ENT/MOUTH: POSITIVE for sinus allergic rhinitis.  RESP:POSITIVE for interstitial lung disease.  CV: NEGATIVE for chest pain, palpitations or peripheral edema  GI: NEGATIVE for nausea, abdominal pain, heartburn, or change in bowel habits  : NEGATIVE for frequency, dysuria, or hematuria  MUSCULOSKELETAL: NEGATIVE for significant arthralgias or myalgia  NEURO: NEGATIVE for weakness, dizziness or paresthesias  ENDOCRINE: NEGATIVE for temperature intolerance, skin/hair changes  HEME: NEGATIVE for bleeding problems  PSYCHIATRIC: NEGATIVE for changes in mood or affect    OBJECTIVE:   /77 (BP Location: Left arm, Patient Position: Chair, Cuff Size: Adult Regular)   Pulse 77   Temp 97.2  F (36.2  C) (Temporal)   Resp 16   Ht 1.797 m (5' 10.75\")   Wt 92.1 kg (203 lb)   SpO2 97%   BMI 28.51 kg/m     Estimated body mass index is 28.51 kg/m  as calculated from the following:    Height as of this encounter: 1.797 m (5' 10.75\").    Weight as of this encounter: 92.1 kg (203 lb).  Physical Exam  GENERAL: alert and no distress  EYES: Eyes grossly normal to inspection and conjunctivae and sclerae normal  HENT: normal cephalic/atraumatic and oral mucous membranes moist  NECK: no adenopathy, no asymmetry, masses, or scars, and thyroid normal to palpation  RESP: lungs clear to auscultation - no rales, rhonchi or wheezes  CV: regular rates and rhythm, no murmur, click or rub, peripheral pulses strong, and no peripheral edema  ABDOMEN: soft, nontender, without hepatosplenomegaly or masses and bowel sounds normal  MS: no gross musculoskeletal defects noted, no edema  NEURO: Normal strength and tone, mentation intact and speech normal  PSYCH: mentation appears normal, affect normal/bright    Diagnostic Test Results:  Results for orders placed or performed in visit on 12/05/24 "   Lipid panel reflex to direct LDL Non-fasting     Status: Abnormal   Result Value Ref Range    Cholesterol 235 (H) <200 mg/dL    Triglycerides 88 <150 mg/dL    Direct Measure HDL 69 >=40 mg/dL    LDL Cholesterol Calculated 148 (H) <100 mg/dL    Non HDL Cholesterol 166 (H) <130 mg/dL    Patient Fasting > 8hrs? No     Narrative    Cholesterol  Desirable: < 200 mg/dL  Borderline High: 200 - 239 mg/dL  High: >= 240 mg/dL    Triglycerides  Normal: < 150 mg/dL  Borderline High: 150 - 199 mg/dL  High: 200-499 mg/dL  Very High: >= 500 mg/dL    Direct Measure HDL  Female: >= 50 mg/dL   Male: >= 40 mg/dL    LDL Cholesterol  Desirable: < 100 mg/dL  Above Desirable: 100 - 129 mg/dL   Borderline High: 130 - 159 mg/dL   High:  160 - 189 mg/dL   Very High: >= 190 mg/dL    Non HDL Cholesterol  Desirable: < 130 mg/dL  Above Desirable: 130 - 159 mg/dL  Borderline High: 160 - 189 mg/dL  High: 190 - 219 mg/dL  Very High: >= 220 mg/dL   PSA, screen     Status: Normal   Result Value Ref Range    Prostate Specific Antigen Screen 0.82 0.00 - 3.50 ng/mL    Narrative    This result is obtained using the Roche Elecsys total PSA method on the emma [id number] immunoassay analyzer, which is an ultrasensitive method. Results obtained with different assay methods or kits cannot be used interchangeably.  This test is intended for initial prostate cancer screening. PSA values exceeding the age-specific limits are suspicious for prostate disease, but additional testing, such as prostate biopsy, is needed to diagnose prostate pathology. The American Cancer Society recommends annual examination with digital rectal examination and serum PSA beginning at age 50 and for men with a life expectancy of at least 10 years after detection of prostate cancer. For men in high-risk groups, such as  Americans or men with a first-degree relative diagnosed at a younger age, testing should begin at a younger age. It is generally recommended that information be  provided to patients about the benefits and limitations of testing and treatment so they can make informed decisions.   Comprehensive metabolic panel     Status: Abnormal   Result Value Ref Range    Sodium 138 135 - 145 mmol/L    Potassium 4.2 3.4 - 5.3 mmol/L    Carbon Dioxide (CO2) 30 (H) 22 - 29 mmol/L    Anion Gap 8 7 - 15 mmol/L    Urea Nitrogen 24.0 (H) 6.0 - 20.0 mg/dL    Creatinine 0.81 0.67 - 1.17 mg/dL    GFR Estimate >90 >60 mL/min/1.73m2    Calcium 9.9 8.8 - 10.4 mg/dL    Chloride 100 98 - 107 mmol/L    Glucose 100 (H) 70 - 99 mg/dL    Alkaline Phosphatase 118 40 - 150 U/L    AST 21 0 - 45 U/L    ALT 39 0 - 70 U/L    Protein Total 7.8 6.4 - 8.3 g/dL    Albumin 4.4 3.5 - 5.2 g/dL    Bilirubin Total 0.4 <=1.2 mg/dL    Patient Fasting > 8hrs? No    CBC with platelets and differential     Status: Abnormal   Result Value Ref Range    WBC Count 11.0 4.0 - 11.0 10e3/uL    RBC Count 4.48 4.40 - 5.90 10e6/uL    Hemoglobin 13.7 13.3 - 17.7 g/dL    Hematocrit 40.4 40.0 - 53.0 %    MCV 90 78 - 100 fL    MCH 30.6 26.5 - 33.0 pg    MCHC 33.9 31.5 - 36.5 g/dL    RDW 12.5 10.0 - 15.0 %    Platelet Count 166 150 - 450 10e3/uL    % Neutrophils 88 %    % Lymphocytes 8 %    % Monocytes 4 %    % Eosinophils 0 %    % Basophils 0 %    % Immature Granulocytes 0 %    Absolute Neutrophils 9.6 (H) 1.6 - 8.3 10e3/uL    Absolute Lymphocytes 0.9 0.8 - 5.3 10e3/uL    Absolute Monocytes 0.4 0.0 - 1.3 10e3/uL    Absolute Eosinophils 0.0 0.0 - 0.7 10e3/uL    Absolute Basophils 0.0 0.0 - 0.2 10e3/uL    Absolute Immature Granulocytes 0.0 <=0.4 10e3/uL   Allergen cat epithellium IgE     Status: Normal   Result Value Ref Range    Cat Dander IgE <0.10 <0.10 KU(A)/L    Narrative    ImmunoCAP Specific IgE Blood Test Quantitative Scoring  <0.10 kU(A)/L        Absent/undetectable  0.10-0.69 kU(A)/L    Low  0.70-3.49 kU(A)/L    Moderate  3.50-17.50 kU(A)/L   High  >17.50 kU(A)/L      Very High  Please note: In general, low IgE antibody levels  indicate a low probability of clinical disease, whereas high antibody levels to an allergen show good correlation with clinical disease.   Allergen dog epithelium IgE     Status: Normal   Result Value Ref Range    Dog Dander IgE <0.10 <0.10 KU(A)/L    Narrative    ImmunoCAP Specific IgE Blood Test Quantitative Scoring  <0.10 kU(A)/L        Absent/undetectable  0.10-0.69 kU(A)/L    Low  0.70-3.49 kU(A)/L    Moderate  3.50-17.50 kU(A)/L   High  >17.50 kU(A)/L      Very High  Please note: In general, low IgE antibody levels indicate a low probability of clinical disease, whereas high antibody levels to an allergen show good correlation with clinical disease.   Allergen Roddy grass IgE     Status: Normal   Result Value Ref Range    Roddy Grass IgE <0.10 <0.10 KU(A)/L    Narrative    ImmunoCAP Specific IgE Blood Test Quantitative Scoring  <0.10 kU(A)/L        Absent/undetectable  0.10-0.69 kU(A)/L    Low  0.70-3.49 kU(A)/L    Moderate  3.50-17.50 kU(A)/L   High  >17.50 kU(A)/L      Very High  Please note: In general, low IgE antibody levels indicate a low probability of clinical disease, whereas high antibody levels to an allergen show good correlation with clinical disease.   Allergen orchard grass IgE     Status: Normal   Result Value Ref Range    [place] (Orchard Grass) IgE <0.10 <0.10 KU(A)/L    Narrative    ImmunoCAP Specific IgE Blood Test Quantitative Scoring  <0.10 kU(A)/L        Absent/undetectable  0.10-0.69 kU(A)/L    Low  0.70-3.49 kU(A)/L    Moderate  3.50-17.50 kU(A)/L   High  >17.50 kU(A)/L      Very High  Please note: In general, low IgE antibody levels indicate a low probability of clinical disease, whereas high antibody levels to an allergen show good correlation with clinical disease.   Allergen yohannes IgE     Status: Normal   Result Value Ref Range    Yohannes Grass IgE <0.10 <0.10 KU(A)/L    Narrative    ImmunoCAP Specific IgE Blood Test Quantitative Scoring  <0.10 kU(A)/L         Absent/undetectable  0.10-0.69 kU(A)/L    Low  0.70-3.49 kU(A)/L    Moderate  3.50-17.50 kU(A)/L   High  >17.50 kU(A)/L      Very High  Please note: In general, low IgE antibody levels indicate a low probability of clinical disease, whereas high antibody levels to an allergen show good correlation with clinical disease.   Allergen D farinae IgE     Status: Normal   Result Value Ref Range    Dermatophagoides farinae IgE <0.10 <0.10 KU(A)/L    Narrative    ImmunoCAP Specific IgE Blood Test Quantitative Scoring  <0.10 kU(A)/L        Absent/undetectable  0.10-0.69 kU(A)/L    Low  0.70-3.49 kU(A)/L    Moderate  3.50-17.50 kU(A)/L   High  >17.50 kU(A)/L      Very High  Please note: In general, low IgE antibody levels indicate a low probability of clinical disease, whereas high antibody levels to an allergen show good correlation with clinical disease.   Allergen D pteronyssinus IgE     Status: Normal   Result Value Ref Range    Dermatophagoide pteronyssinus IgE <0.10 <0.10 KU(A)/L    Narrative    ImmunoCAP Specific IgE Blood Test Quantitative Scoring  <0.10 kU(A)/L        Absent/undetectable  0.10-0.69 kU(A)/L    Low  0.70-3.49 kU(A)/L    Moderate  3.50-17.50 kU(A)/L   High  >17.50 kU(A)/L      Very High  Please note: In general, low IgE antibody levels indicate a low probability of clinical disease, whereas high antibody levels to an allergen show good correlation with clinical disease.   Allergen alternaria alternata IgE     Status: Normal   Result Value Ref Range    Alternaria alternata, Mold IgE <0.10 <0.10 KU(A)/L    Narrative    ImmunoCAP Specific IgE Blood Test Quantitative Scoring  <0.10 kU(A)/L        Absent/undetectable  0.10-0.69 kU(A)/L    Low  0.70-3.49 kU(A)/L    Moderate  3.50-17.50 kU(A)/L   High  >17.50 kU(A)/L      Very High  Please note: In general, low IgE antibody levels indicate a low probability of clinical disease, whereas high antibody levels to an allergen show good correlation with clinical  disease.   Allergen aspergillus fumigatus IgE     Status: Normal   Result Value Ref Range    Aspergillis fumigatus IgE <0.10 <0.10 KU(A)/L    Narrative    ImmunoCAP Specific IgE Blood Test Quantitative Scoring  <0.10 kU(A)/L        Absent/undetectable  0.10-0.69 kU(A)/L    Low  0.70-3.49 kU(A)/L    Moderate  3.50-17.50 kU(A)/L   High  >17.50 kU(A)/L      Very High  Please note: In general, low IgE antibody levels indicate a low probability of clinical disease, whereas high antibody levels to an allergen show good correlation with clinical disease.   Allergen cladosporium herbarum IgE     Status: Normal   Result Value Ref Range    Cladosporium herbarum IgE <0.10 <0.10 KU(A)/L    Narrative    ImmunoCAP Specific IgE Blood Test Quantitative Scoring  <0.10 kU(A)/L        Absent/undetectable  0.10-0.69 kU(A)/L    Low  0.70-3.49 kU(A)/L    Moderate  3.50-17.50 kU(A)/L   High  >17.50 kU(A)/L      Very High  Please note: In general, low IgE antibody levels indicate a low probability of clinical disease, whereas high antibody levels to an allergen show good correlation with clinical disease.   Allergen Epicoccum purpurascens IgE     Status: Normal   Result Value Ref Range    Epicoccum purpurascens IgE <0.10 <0.10 KU(A)/L    Narrative    ImmunoCAP Specific IgE Blood Test Quantitative Scoring  <0.10 kU(A)/L        Absent/undetectable  0.10-0.69 kU(A)/L    Low  0.70-3.49 kU(A)/L    Moderate  3.50-17.50 kU(A)/L   High  >17.50 kU(A)/L      Very High  Please note: In general, low IgE antibody levels indicate a low probability of clinical disease, whereas high antibody levels to an allergen show good correlation with clinical disease.   Allergen penicillium notatum IgE     Status: Normal   Result Value Ref Range    Penicillium notatum IgE <0.10 <0.10 KU(A)/L    Narrative    ImmunoCAP Specific IgE Blood Test Quantitative Scoring  <0.10 kU(A)/L        Absent/undetectable  0.10-0.69 kU(A)/L    Low  0.70-3.49 kU(A)/L     Moderate  3.50-17.50 kU(A)/L   High  >17.50 kU(A)/L      Very High  Please note: In general, low IgE antibody levels indicate a low probability of clinical disease, whereas high antibody levels to an allergen show good correlation with clinical disease.   Allergen dutch white IgE     Status: Normal   Result Value Ref Range    White Dutch, Tree IgE <0.10 <0.10 KU(A)/L    Narrative    ImmunoCAP Specific IgE Blood Test Quantitative Scoring  <0.10 kU(A)/L        Absent/undetectable  0.10-0.69 kU(A)/L    Low  0.70-3.49 kU(A)/L    Moderate  3.50-17.50 kU(A)/L   High  >17.50 kU(A)/L      Very High  Please note: In general, low IgE antibody levels indicate a low probability of clinical disease, whereas high antibody levels to an allergen show good correlation with clinical disease.   Allergen Cedar IgE     Status: Normal   Result Value Ref Range    Cedar, Tree IgE <0.10 <0.10 KU(A)/L    Narrative    ImmunoCAP Specific IgE Blood Test Quantitative Scoring  <0.10 kU(A)/L        Absent/undetectable  0.10-0.69 kU(A)/L    Low  0.70-3.49 kU(A)/L    Moderate  3.50-17.50 kU(A)/L   High  >17.50 kU(A)/L      Very High  Please note: In general, low IgE antibody levels indicate a low probability of clinical disease, whereas high antibody levels to an allergen show good correlation with clinical disease.   Allergen cottonwood IgE     Status: Normal   Result Value Ref Range    [place] IgE <0.10 <0.10 KU(A)/L    Narrative    ImmunoCAP Specific IgE Blood Test Quantitative Scoring  <0.10 kU(A)/L        Absent/undetectable  0.10-0.69 kU(A)/L    Low  0.70-3.49 kU(A)/L    Moderate  3.50-17.50 kU(A)/L   High  >17.50 kU(A)/L      Very High  Please note: In general, low IgE antibody levels indicate a low probability of clinical disease, whereas high antibody levels to an allergen show good correlation with clinical disease.   Allergen elm IgE     Status: Normal   Result Value Ref Range    Elm Tree IgE <0.10 <0.10 KU(A)/L    Narrative    ImmunoCAP  Specific IgE Blood Test Quantitative Scoring  <0.10 kU(A)/L        Absent/undetectable  0.10-0.69 kU(A)/L    Low  0.70-3.49 kU(A)/L    Moderate  3.50-17.50 kU(A)/L   High  >17.50 kU(A)/L      Very High  Please note: In general, low IgE antibody levels indicate a low probability of clinical disease, whereas high antibody levels to an allergen show good correlation with clinical disease.   Allergen maple box elder IgE     Status: Normal   Result Value Ref Range    Maple Tree IgE <0.10 <0.10 KU(A)/L    Narrative    ImmunoCAP Specific IgE Blood Test Quantitative Scoring  <0.10 kU(A)/L        Absent/undetectable  0.10-0.69 kU(A)/L    Low  0.70-3.49 kU(A)/L    Moderate  3.50-17.50 kU(A)/L   High  >17.50 kU(A)/L      Very High  Please note: In general, low IgE antibody levels indicate a low probability of clinical disease, whereas high antibody levels to an allergen show good correlation with clinical disease.   Allergen oak white IgE     Status: Normal   Result Value Ref Range    Oak (White) IgE <0.10 <0.10 KU(A)/L    Narrative    ImmunoCAP Specific IgE Blood Test Quantitative Scoring  <0.10 kU(A)/L        Absent/undetectable  0.10-0.69 kU(A)/L    Low  0.70-3.49 kU(A)/L    Moderate  3.50-17.50 kU(A)/L   High  >17.50 kU(A)/L      Very High  Please note: In general, low IgE antibody levels indicate a low probability of clinical disease, whereas high antibody levels to an allergen show good correlation with clinical disease.   Allergen [place] IgE     Status: Normal   Result Value Ref Range    [place] IgE <0.10 <0.10 KU(A)/L    Narrative    Analyte Specific Reagents (ASRs) are used in many laboratory tests necessary for standard medical care and generally do not require FDA approval.  This test was developed and its performance characteristics determined by [facility].  It has not been cleared or approved by the [place] Food and Drug Administration.    ImmunoCAP Specific IgE  Blood Test Quantitative Scoring  <0.10 kU(A)/L        Absent/undetectable  0.10-0.69 kU(A)/L    Low  0.70-3.49 kU(A)/L    Moderate  3.50-17.50 kU(A)/L   High  >17.50 kU(A)/L      Very High  Please note: In general, low IgE antibody levels indicate a low probability of clinical disease, whereas high antibody levels to an allergen show good correlation with clinical disease.   Allergen silver  birch IgE     Status: Normal   Result Value Ref Range    Silver Birch IgE <0.10 <0.10 KU(A)/L    Narrative    ImmunoCAP Specific IgE Blood Test Quantitative Scoring  <0.10 kU(A)/L        Absent/undetectable  0.10-0.69 kU(A)/L    Low  0.70-3.49 kU(A)/L    Moderate  3.50-17.50 kU(A)/L   High  >17.50 kU(A)/L      Very High  Please note: In general, low IgE antibody levels indicate a low probability of clinical disease, whereas high antibody levels to an allergen show good correlation with clinical disease.   Allergen Tree White [place] IgE     Status: Normal   Result Value Ref Range    White [place] IgE <0.10 <0.10 KU(A)/L    Narrative    ImmunoCAP Specific IgE Blood Test Quantitative Scoring  <0.10 kU(A)/L        Absent/undetectable  0.10-0.69 kU(A)/L    Low  0.70-3.49 kU(A)/L    Moderate  3.50-17.50 kU(A)/L   High  >17.50 kU(A)/L      Very High  Please note: In general, low IgE antibody levels indicate a low probability of clinical disease, whereas high antibody levels to an allergen show good correlation with clinical disease.   Allergen [place] Tree     Status: Normal   Result Value Ref Range    [place] Tree IgE <0.10 <0.10 KU(A)/L    Narrative    ImmunoCAP Specific IgE Blood Test Quantitative Scoring  <0.10 kU(A)/L        Absent/undetectable  0.10-0.69 kU(A)/L    Low  0.70-3.49 kU(A)/L    Moderate  3.50-17.50 kU(A)/L   High  >17.50 kU(A)/L      Very High  Please note: In general, low IgE antibody levels indicate a low probability of clinical disease, whereas high antibody levels to an allergen show good correlation with  clinical disease.   Allergen white pine IgE     Status: Normal   Result Value Ref Range    [place] IgE <0.10 <0.10 KU(A)/L    Narrative    ImmunoCAP Specific IgE Blood Test Quantitative Scoring  <0.10 kU(A)/L        Absent/undetectable  0.10-0.69 kU(A)/L    Low  0.70-3.49 kU(A)/L    Moderate  3.50-17.50 kU(A)/L   High  >17.50 kU(A)/L      Very High  Please note: In general, low IgE antibody levels indicate a low probability of clinical disease, whereas high antibody levels to an allergen show good correlation with clinical disease.   Allergen English plantain IgE     Status: Normal   Result Value Ref Range    English Plantain IgE <0.10 <0.10 KU(A)/L    Narrative    ImmunoCAP Specific IgE Blood Test Quantitative Scoring  <0.10 kU(A)/L        Absent/undetectable  0.10-0.69 kU(A)/L    Low  0.70-3.49 kU(A)/L    Moderate  3.50-17.50 kU(A)/L   High  >17.50 kU(A)/L      Very High  Please note: In general, low IgE antibody levels indicate a low probability of clinical disease, whereas high antibody levels to an allergen show good correlation with clinical disease.   Allergen giant ragweed IgE     Status: Normal   Result Value Ref Range    Giant Ragweed IgE <0.10 <0.10 KU(A)/L    Narrative    ImmunoCAP Specific IgE Blood Test Quantitative Scoring  <0.10 kU(A)/L        Absent/undetectable  0.10-0.69 kU(A)/L    Low  0.70-3.49 kU(A)/L    Moderate  3.50-17.50 kU(A)/L   High  >17.50 kU(A)/L      Very High  Please note: In general, low IgE antibody levels indicate a low probability of clinical disease, whereas high antibody levels to an allergen show good correlation with clinical disease.   Allergen lamb's quarter IgE     Status: Normal   Result Value Ref Range    Healy's Quarter's IgE <0.10 <0.10 KU(A)/L    Narrative    ImmunoCAP Specific IgE Blood Test Quantitative Scoring  <0.10 kU(A)/L        Absent/undetectable  0.10-0.69 kU(A)/L    Low  0.70-3.49 kU(A)/L    Moderate  3.50-17.50 kU(A)/L   High  >17.50 kU(A)/L      Very  High  Please note: In general, low IgE antibody levels indicate a low probability of clinical disease, whereas high antibody levels to an allergen show good correlation with clinical disease.   Allergen Mugwort IgE     Status: Normal   Result Value Ref Range    Mugwort IgE <0.10 <0.10 KU(A)/L    Narrative    ImmunoCAP Specific IgE Blood Test Quantitative Scoring  <0.10 kU(A)/L        Absent/undetectable  0.10-0.69 kU(A)/L    Low  0.70-3.49 kU(A)/L    Moderate  3.50-17.50 kU(A)/L   High  >17.50 kU(A)/L      Very High  Please note: In general, low IgE antibody levels indicate a low probability of clinical disease, whereas high antibody levels to an allergen show good correlation with clinical disease.   Allergen ragweed short IgE     Status: Normal   Result Value Ref Range    Ragweed Short IgE <0.10 <0.10 KU(A)/L    Narrative    ImmunoCAP Specific IgE Blood Test Quantitative Scoring  <0.10 kU(A)/L        Absent/undetectable  0.10-0.69 kU(A)/L    Low  0.70-3.49 kU(A)/L    Moderate  3.50-17.50 kU(A)/L   High  >17.50 kU(A)/L      Very High  Please note: In general, low IgE antibody levels indicate a low probability of clinical disease, whereas high antibody levels to an allergen show good correlation with clinical disease.   Allergen Sagebrush Wormwood IgE     Status: Normal   Result Value Ref Range    Sagebrush Wormwood IgE <0.10 <0.10 KU(A)/L    Narrative    ImmunoCAP Specific IgE Blood Test Quantitative Scoring  <0.10 kU(A)/L        Absent/undetectable  0.10-0.69 kU(A)/L    Low  0.70-3.49 kU(A)/L    Moderate  3.50-17.50 kU(A)/L   High  >17.50 kU(A)/L      Very High  Please note: In general, low IgE antibody levels indicate a low probability of clinical disease, whereas high antibody levels to an allergen show good correlation with clinical disease.   Allergen Sheep Sorrel IgE     Status: Normal   Result Value Ref Range    Sheep Sorrel IgE <0.10 <0.10 KU(A)/L    Narrative    ImmunoCAP Specific IgE Blood Test  Quantitative Scoring  <0.10 kU(A)/L        Absent/undetectable  0.10-0.69 kU(A)/L    Low  0.70-3.49 kU(A)/L    Moderate  3.50-17.50 kU(A)/L   High  >17.50 kU(A)/L      Very High  Please note: In general, low IgE antibody levels indicate a low probability of clinical disease, whereas high antibody levels to an allergen show good correlation with clinical disease.   Allergen thistle Russian IgE     Status: Normal   Result Value Ref Range    Russian Thistle IgE <0.10 <0.10 KU(A)/L    Narrative    ImmunoCAP Specific IgE Blood Test Quantitative Scoring  <0.10 kU(A)/L        Absent/undetectable  0.10-0.69 kU(A)/L    Low  0.70-3.49 kU(A)/L    Moderate  3.50-17.50 kU(A)/L   High  >17.50 kU(A)/L      Very High  Please note: In general, low IgE antibody levels indicate a low probability of clinical disease, whereas high antibody levels to an allergen show good correlation with clinical disease.   Allergen Weed Nettle IgE     Status: Normal   Result Value Ref Range    Weed Nettle IgE <0.10 <0.10 KU(A)/L    Narrative    ImmunoCAP Specific IgE Blood Test Quantitative Scoring  <0.10 kU(A)/L        Absent/undetectable  0.10-0.69 kU(A)/L    Low  0.70-3.49 kU(A)/L    Moderate  3.50-17.50 kU(A)/L   High  >17.50 kU(A)/L      Very High  Please note: In general, low IgE antibody levels indicate a low probability of clinical disease, whereas high antibody levels to an allergen show good correlation with clinical disease.   Allergen, Kochia/Firebush     Status: Normal   Result Value Ref Range    Kochia/Firebush IgE <0.10 <0.10 KU(A)/L    Narrative    ImmunoCAP Specific IgE Blood Test Quantitative Scoring  <0.10 kU(A)/L        Absent/undetectable  0.10-0.69 kU(A)/L    Low  0.70-3.49 kU(A)/L    Moderate  3.50-17.50 kU(A)/L   High  >17.50 kU(A)/L      Very High  Please note: In general, low IgE antibody levels indicate a low probability of clinical disease, whereas high antibody levels to an allergen show good correlation with clinical  disease.   CBC with platelets and differential     Status: Abnormal    Narrative    The following orders were created for panel order CBC with platelets and differential.  Procedure                               Abnormality         Status                     ---------                               -----------         ------                     CBC with platelets and d...[[id number]]  Abnormal            Final result                 Please view results for these tests on the individual orders.       ASSESSMENT/PLAN:     Routine general medical examination at a health care facility  - PRIMARY CARE FOLLOW-UP SCHEDULING  - REVIEW OF HEALTH MAINTENANCE PROTOCOL ORDERS  - CBC with platelets and differential  - Comprehensive metabolic panel    Hyperlipidemia LDL goal <160  - Lipid panel reflex to direct LDL Non-fasting    Screen for colon cancer  - Fecal colorectal cancer screen FIT - Future (S+30); Future  - Fecal colorectal cancer screen FIT - Future (S+30)    Screening, ischemic heart disease  - CT Coronary Calcium Scan; Future    Screening for prostate cancer  - PSA, screen    ILD (interstitial lung disease) (H)  Defer to Pulmonary medicine.    Allergy to environmental factors  - Allergen cat epithellium IgE  - Allergen dog epithelium IgE  - Allergen Roddy grass IgE  - Allergen orchard grass IgE  - Allergen yohannes IgE  - Allergen D farinae IgE  - Allergen D pteronyssinus IgE  - Allergen alternaria alternata IgE  - Allergen aspergillus fumigatus IgE  - Allergen cladosporium herbarum IgE  - Allergen Epicoccum purpurascens IgE  - Allergen penicillium notatum IgE  - Allergen carlitos white IgE  - Allergen Cedar IgE  - Allergen cottonwood IgE  - Allergen elm IgE  - Allergen maple box elder IgE  - Allergen oak white IgE  - Allergen Red [place] IgE  - Allergen silver  birch IgE  - Allergen Tree White [place] IgE  - Allergen Holbrook Tree  - Allergen white pine IgE  - Allergen English plantain IgE  - Allergen giant ragweed IgE  -  "Allergen lamb's quarter IgE  - Allergen Mugwort IgE  - Allergen ragweed short IgE  - Allergen Sagebrush Wormwood IgE  - Allergen Sheep Sorrel IgE  - Allergen thistle Russian IgE  - Allergen Weed Nettle IgE  - Allergen, Kochia/Firebush     Patient has been advised of split billing requirements and indicates understanding: Yes      Counseling  Special attention given to:        Regular exercise       Healthy diet/nutrition       Colorectal cancer screening       Prostate cancer screening       The 10-year ASCVD risk score (Guillermo MAYS, et al., 2019) is: 4.6%    Values used to calculate the score:      Age: 54 years      Sex: Male      Is Non- : No      Diabetic: No      Tobacco smoker: No      Systolic Blood Pressure: 121 mmHg      Is BP treated: No      HDL Cholesterol: 69 mg/dL      Total Cholesterol: 235 mg/dL      BMI  Estimated body mass index is 28.51 kg/m  as calculated from the following:    Height as of this encounter: 1.797 m (5' 10.75\").    Weight as of this encounter: 92.1 kg (203 lb).   Weight management plan: diet and exercise.      He reports that he quit smoking about 23 years ago. His smoking use included cigarettes. He started smoking about 33 years ago. He has a 5 pack-year smoking history. He has never been exposed to tobacco smoke. He has never used smokeless tobacco.            Signed Electronically by: Rocky Yi MD  "

## 2024-12-23 DIAGNOSIS — J30.89 SEASONAL ALLERGIC RHINITIS DUE TO OTHER ALLERGIC TRIGGER: ICD-10-CM

## 2024-12-23 RX ORDER — MONTELUKAST SODIUM 10 MG/1
1 TABLET ORAL AT BEDTIME
Qty: 30 TABLET | Refills: 5 | Status: SHIPPED | OUTPATIENT
Start: 2024-12-23

## 2025-01-13 ENCOUNTER — E-VISIT (OUTPATIENT)
Dept: FAMILY MEDICINE | Facility: CLINIC | Age: 55
End: 2025-01-13
Payer: COMMERCIAL

## 2025-01-13 DIAGNOSIS — M54.40 BACK PAIN OF LUMBOSACRAL REGION WITH SCIATICA: Primary | ICD-10-CM

## 2025-01-15 ENCOUNTER — TRANSFERRED RECORDS (OUTPATIENT)
Dept: HEALTH INFORMATION MANAGEMENT | Facility: CLINIC | Age: 55
End: 2025-01-15
Payer: COMMERCIAL

## 2025-02-10 LAB — HEMOCCULT STL QL IA: NEGATIVE

## 2025-02-20 ENCOUNTER — MYC MEDICAL ADVICE (OUTPATIENT)
Dept: FAMILY MEDICINE | Facility: CLINIC | Age: 55
End: 2025-02-20
Payer: COMMERCIAL

## 2025-02-20 NOTE — TELEPHONE ENCOUNTER
Spoke with the imaging department who will contact the pt to assist with scheduling. She confirmed that a radiology cardiology consult is ordered in conjunction with the CT calcium channel score for interpretation. [employer] message sent to pt.     Rosa Nelson RN on 2/20/2025 at 12:25 PM

## 2025-02-25 ENCOUNTER — OFFICE VISIT (OUTPATIENT)
Dept: ORTHOPEDICS | Facility: CLINIC | Age: 55
End: 2025-02-25
Payer: COMMERCIAL

## 2025-02-25 DIAGNOSIS — M87.052 AVASCULAR NECROSIS OF BONE OF LEFT HIP (H): Primary | ICD-10-CM

## 2025-02-25 PROCEDURE — 99214 OFFICE O/P EST MOD 30 MIN: CPT | Performed by: FAMILY MEDICINE

## 2025-02-25 ASSESSMENT — PAIN SCALES - GENERAL: PAINLEVEL_OUTOF10: MODERATE PAIN (4)

## 2025-02-25 NOTE — PROGRESS NOTES
CHIEF COMPLAINT:  Pain and New Patient of the Left Hip and New Patient and Pain of the Right Hip       HISTORY OF PRESENT ILLNESS  Mr. Contreras is a 54 year old male who presents to clinic today with left hip pain.  Khoa was recently seen at an outside orthopedic institution for his back and hip.  He had a MRI of his left hip in November that did reveal some arthritic change and some osteonecrosis.  He had good discussions with the providers that had ordered this, he has been going through chiropractic treatment and has been engaging in other conservative treatment methods.  His hip still bothers him quite a bit, he points to the anterior portion of his hip.        Additional history: as documented    MEDICAL HISTORY  Patient Active Problem List   Diagnosis    ADD (attention deficit disorder)    HARLAN (obstructive sleep apnea)    Hyperlipidemia LDL goal <160    Generalized anxiety disorder    Benign skin lesion of face    Dysthymia    Allergic rhinitis    Abnormal CXR perihilar nodes on CT chest    Palpitations    Mediastinal lymphadenopathy    Cervical myofascial strain, sequela    Chronic pain of left knee    Pulmonary nodules    Benign prostatic hyperplasia with urinary hesitancy    Acute left-sided low back pain with left-sided sciatica    ILD (interstitial lung disease) (H)       Current Outpatient Medications   Medication Sig Dispense Refill    albuterol (PROAIR HFA/PROVENTIL HFA/VENTOLIN HFA) 108 (90 Base) MCG/ACT inhaler Inhale 1-2 puffs into the lungs every 4 hours as needed for shortness of breath, wheezing or cough 18 g 2    amphetamine-dextroamphetamine (ADDERALL XR) 15 MG per 24 hr capsule TAKE ONE CAPSULE BY MOUTH EVERY MORNING PLEASE MAKE AN APPOINTMENT FOR FUTURE REFILLS  0    azelastine (ASTELIN) 0.1 % nasal spray USE 2 SPRAYS IN EACH NOSTRIL TWICE DAILY 90 mL 1    baclofen (LIORESAL) 10 MG tablet Take 1 tablet (10 mg) by mouth 3 times daily as needed for muscle spasms. 90 tablet 3    cetirizine  (ZYRTEC) 10 MG tablet Take 1 tablet (10 mg) by mouth At Bedtime 30 tablet 11    fluticasone-salmeterol (WIXELA INHUB) 100-50 MCG/ACT inhaler INHALE 1 PUFF INTO THE LUNGS EVERY 12 HOURS 60 each 5    mometasone (NASONEX) 50 MCG/ACT nasal spray Spray 2 sprays into both nostrils daily. SHAKE LIQUID AND USE 17 g 1    montelukast (SINGULAIR) 10 MG tablet TAKE 1 TABLET(10 MG) BY MOUTH AT BEDTIME 30 tablet 5    predniSONE (DELTASONE) 20 MG tablet Take 2 tablets (40 mg) by mouth every morning. 10 tablet 0    tiZANidine (ZANAFLEX) 4 MG tablet Take 1 tablet (4 mg) by mouth 3 times daily as needed for muscle spasms. 90 tablet 0    venlafaxine (EFFEXOR XR) 150 MG 24 hr capsule Take 150 mg by mouth daily      alfuzosin ER (UROXATRAL) 10 MG 24 hr tablet Take 1 tablet (10 mg) by mouth daily. 90 tablet 3       No Known Allergies    Family History   Problem Relation Age of Onset    C.A.D. Maternal Grandfather     Alcohol/Drug Maternal Grandfather          from alcoholism    Cerebrovascular Disease Maternal Grandfather     Coronary Artery Disease Maternal Grandfather     Prostate Cancer Maternal Grandfather     Lipids Father     Cancer Maternal Grandmother         Pancreatic    Cancer Paternal Grandmother         Lung, may not have been primary    Heart Disease Paternal Grandfather     Anxiety Disorder Sister     Anxiety Disorder Sister     LUNG DISEASE Maternal Uncle         Asthma versus sarcoid    Asthma Other     Diabetes No family hx of     Hypertension No family hx of     Breast Cancer No family hx of     Cancer - colorectal No family hx of     Connective Tissue Disorder No family hx of     Thyroid Disease No family hx of        Additional medical/Social/Surgical histories reviewed in [facility] and updated as appropriate.        PHYSICAL EXAM  General  - normal appearance, in no obvious distress  Musculoskeletal - left hip  - stance: gait slightly favors affected side  - inspection: no swelling or effusion,  normal bone and joint  alignment, no obvious deformity  - palpation: no lateral or anterior hip tenderness  - ROM: limited flexion and internal rotation secondary to pain, pain with passive and active ROM   - strength: 5/5 in all planes  - special tests:  (-) ALLEN  (-) FADIR  Neuro  - no sensory or motor deficit, grossly normal coordination, normal muscle tone             ASSESSMENT & PLAN  Mr. Contreras is a 54 year old male who presents to clinic today with left hip pain and a recent diagnosis of avascular necrosis.    Unfortunately the images or reports detailing his osteonecrosis are not available for my review.  However, we did discuss osteonecrosis in some depth with regards to pathophysiology and general management.    Ultimately I am referring Khoa to our surgical partners to discuss higher level treatment options and if he may need monitoring for his avascular necrosis.  Should this worsen I would also hope to avoid a femoral neck stress fracture, although I would like for him to remain as active as he is able without pain.    It was a pleasure seeing Conor today.    Favian Pozo DO, [facility]  Primary Care Sports Medicine      This note was constructed using Dragon dictation software, please excuse any minor errors in spelling, grammar, or syntax.

## 2025-02-25 NOTE — LETTER
2/25/2025      Conor Contreras  [address] [address]      Dear Colleague,    Thank you for referring your patient, oCnor Contreras, to the [facility] [place]. Please see a copy of my visit note below.    CHIEF COMPLAINT:  Pain and New Patient of the Left Hip and New Patient and Pain of the Right Hip       HISTORY OF PRESENT ILLNESS  Mr. Contreras is a 54 year old male who presents to clinic today with left hip pain.  Khoa was recently seen at an outside orthopedic institution for his back and hip.  He had a MRI of his left hip in November that did reveal some arthritic change and some osteonecrosis.  He had good discussions with the providers that had ordered this, he has been going through chiropractic treatment and has been engaging in other conservative treatment methods.  His hip still bothers him quite a bit, he points to the anterior portion of his hip.        Additional history: as documented    MEDICAL HISTORY  Patient Active Problem List   Diagnosis     ADD (attention deficit disorder)     HARLAN (obstructive sleep apnea)     Hyperlipidemia LDL goal <160     Generalized anxiety disorder     Benign skin lesion of face     Dysthymia     Allergic rhinitis     Abnormal CXR perihilar nodes on CT chest     Palpitations     Mediastinal lymphadenopathy     Cervical myofascial strain, sequela     Chronic pain of left knee     Pulmonary nodules     Benign prostatic hyperplasia with urinary hesitancy     Acute left-sided low back pain with left-sided sciatica     ILD (interstitial lung disease) (H)       Current Outpatient Medications   Medication Sig Dispense Refill     albuterol (PROAIR HFA/PROVENTIL HFA/VENTOLIN HFA) 108 (90 Base) MCG/ACT inhaler Inhale 1-2 puffs into the lungs every 4 hours as needed for shortness of breath, wheezing or cough 18 g 2     amphetamine-dextroamphetamine (ADDERALL XR) 15 MG per 24 hr capsule TAKE ONE CAPSULE BY MOUTH EVERY MORNING  PLEASE MAKE AN APPOINTMENT FOR FUTURE REFILLS  0     azelastine (ASTELIN) 0.1 % nasal spray USE 2 SPRAYS IN EACH NOSTRIL TWICE DAILY 90 mL 1     baclofen (LIORESAL) 10 MG tablet Take 1 tablet (10 mg) by mouth 3 times daily as needed for muscle spasms. 90 tablet 3     cetirizine (ZYRTEC) 10 MG tablet Take 1 tablet (10 mg) by mouth At Bedtime 30 tablet 11     fluticasone-salmeterol (WIXELA INHUB) 100-50 MCG/ACT inhaler INHALE 1 PUFF INTO THE LUNGS EVERY 12 HOURS 60 each 5     mometasone (NASONEX) 50 MCG/ACT nasal spray Spray 2 sprays into both nostrils daily. SHAKE LIQUID AND USE 17 g 1     montelukast (SINGULAIR) 10 MG tablet TAKE 1 TABLET(10 MG) BY MOUTH AT BEDTIME 30 tablet 5     predniSONE (DELTASONE) 20 MG tablet Take 2 tablets (40 mg) by mouth every morning. 10 tablet 0     tiZANidine (ZANAFLEX) 4 MG tablet Take 1 tablet (4 mg) by mouth 3 times daily as needed for muscle spasms. 90 tablet 0     venlafaxine (EFFEXOR XR) 150 MG 24 hr capsule Take 150 mg by mouth daily       alfuzosin ER (UROXATRAL) 10 MG 24 hr tablet Take 1 tablet (10 mg) by mouth daily. 90 tablet 3       No Known Allergies    Family History   Problem Relation Age of Onset     C.A.D. Maternal Grandfather      Alcohol/Drug Maternal Grandfather          from alcoholism     Cerebrovascular Disease Maternal Grandfather      Coronary Artery Disease Maternal Grandfather      Prostate Cancer Maternal Grandfather      Lipids Father      Cancer Maternal Grandmother         Pancreatic     Cancer Paternal Grandmother         Lung, may not have been primary     Heart Disease Paternal Grandfather      Anxiety Disorder Sister      Anxiety Disorder Sister      LUNG DISEASE Maternal Uncle         Asthma versus sarcoid     Asthma Other      Diabetes No family hx of      Hypertension No family hx of      Breast Cancer No family hx of      Cancer - colorectal No family hx of      Connective Tissue Disorder No family hx of      Thyroid Disease No family hx of         Additional medical/Social/Surgical histories reviewed in [facility] and updated as appropriate.        PHYSICAL EXAM  General  - normal appearance, in no obvious distress  Musculoskeletal - left hip  - stance: gait slightly favors affected side  - inspection: no swelling or effusion,  normal bone and joint alignment, no obvious deformity  - palpation: no lateral or anterior hip tenderness  - ROM: limited flexion and internal rotation secondary to pain, pain with passive and active ROM   - strength: 5/5 in all planes  - special tests:  (-) ALLEN  (-) FADIR  Neuro  - no sensory or motor deficit, grossly normal coordination, normal muscle tone             ASSESSMENT & PLAN  Mr. Contreras is a 54 year old male who presents to clinic today with left hip pain and a recent diagnosis of avascular necrosis.    Unfortunately the images or reports detailing his osteonecrosis are not available for my review.  However, we did discuss osteonecrosis in some depth with regards to pathophysiology and general management.    Ultimately I am referring Khoa to our surgical partners to discuss higher level treatment options and if he may need monitoring for his avascular necrosis.  Should this worsen I would also hope to avoid a femoral neck stress fracture, although I would like for him to remain as active as he is able without pain.    It was a pleasure seeing Conor today.    Favian Pozo DO, [facility]  Primary Care Sports Medicine      This note was constructed using Dragon dictation software, please excuse any minor errors in spelling, grammar, or syntax.      Again, thank you for allowing me to participate in the care of your patient.        Sincerely,    Favian Pozo DO    Electronically signed

## 2025-02-25 NOTE — NURSING NOTE
Chief Complaint   Patient presents with    Left Hip - Pain, New Patient    Right Hip - New Patient, Pain       There were no vitals filed for this visit.    There is no height or weight on file to calculate BMI.      YOVANI Locke NREMT

## 2025-03-08 DIAGNOSIS — J45.20 MILD INTERMITTENT ASTHMA WITHOUT COMPLICATION: ICD-10-CM

## 2025-03-10 RX ORDER — FLUTICASONE PROPIONATE AND SALMETEROL 100; 50 UG/1; UG/1
1 POWDER RESPIRATORY (INHALATION) EVERY 12 HOURS
Qty: 180 EACH | Refills: 0 | Status: SHIPPED | OUTPATIENT
Start: 2025-03-10

## 2025-03-13 ENCOUNTER — ANCILLARY PROCEDURE (OUTPATIENT)
Dept: CT IMAGING | Facility: CLINIC | Age: 55
End: 2025-03-13
Attending: INTERNAL MEDICINE
Payer: COMMERCIAL

## 2025-03-13 DIAGNOSIS — Z13.6 SCREENING, ISCHEMIC HEART DISEASE: ICD-10-CM

## 2025-03-13 PROCEDURE — 75571 CT HRT W/O DYE W/CA TEST: CPT | Mod: GA | Performed by: STUDENT IN AN ORGANIZED HEALTH CARE EDUCATION/TRAINING PROGRAM

## 2025-03-20 ENCOUNTER — OFFICE VISIT (OUTPATIENT)
Dept: URGENT CARE | Facility: URGENT CARE | Age: 55
End: 2025-03-20
Payer: COMMERCIAL

## 2025-03-20 ENCOUNTER — MYC REFILL (OUTPATIENT)
Dept: FAMILY MEDICINE | Facility: CLINIC | Age: 55
End: 2025-03-20

## 2025-03-20 ENCOUNTER — MYC MEDICAL ADVICE (OUTPATIENT)
Dept: FAMILY MEDICINE | Facility: CLINIC | Age: 55
End: 2025-03-20

## 2025-03-20 VITALS
SYSTOLIC BLOOD PRESSURE: 130 MMHG | WEIGHT: 198 LBS | OXYGEN SATURATION: 97 % | DIASTOLIC BLOOD PRESSURE: 75 MMHG | RESPIRATION RATE: 18 BRPM | HEART RATE: 96 BPM | BODY MASS INDEX: 27.81 KG/M2 | TEMPERATURE: 98.1 F

## 2025-03-20 DIAGNOSIS — J45.20 MILD INTERMITTENT ASTHMA WITHOUT COMPLICATION: ICD-10-CM

## 2025-03-20 DIAGNOSIS — M87.052 AVASCULAR NECROSIS OF BONE OF LEFT HIP (H): Primary | ICD-10-CM

## 2025-03-20 DIAGNOSIS — M25.552 CHRONIC LEFT HIP PAIN: ICD-10-CM

## 2025-03-20 DIAGNOSIS — G89.29 CHRONIC LEFT HIP PAIN: ICD-10-CM

## 2025-03-20 DIAGNOSIS — M54.40 BACK PAIN OF LUMBOSACRAL REGION WITH SCIATICA: ICD-10-CM

## 2025-03-20 ASSESSMENT — PAIN SCALES - GENERAL: PAINLEVEL_OUTOF10: MODERATE PAIN (5)

## 2025-03-20 NOTE — PATIENT INSTRUCTIONS
Use biofreeze, Voltaren gel, capsicin cream and/or 4% lidocaine patch to the area for pain.  Continue to use the assistive devices for walking as needed.  Take tizanidine as previously prescribed.  Follow up with your orthopedic provider next month as scheduled.

## 2025-03-20 NOTE — LETTER
March 20, 2025      Conor Contreras  [address] [id number]        To Whom It May Concern:    Conor Contreras  was seen on March 20, 2025.  Please excuse him from work until March 21st due to a previously diagnosed health care condition.        Sincerely,        GURINDER Benavidez CNP    Electronically signed

## 2025-03-20 NOTE — PROGRESS NOTES
ASSESSMENT:  (M87.052) Avascular necrosis of bone of left hip (H)  (primary encounter diagnosis)    (M25.552,  G89.29) Chronic left hip pain    PLAN:  Informed the patient to use Biofreeze, Voltaren gel, capsaicin cream and/or 4% lidocaine patch to the area for pain.  We discussed continuing to use the assistive devices for walking as needed.  We also discussed taking tizanidine as previously prescribed by his primary care provider.  Informed the patient to follow-up with this orthopedic provider next month as scheduled.  Work note provided.  Patient acknowledged his understanding of the above plan.    The use of Dragon/PowerMic dictation services may have been used to construct the content in this note; any grammatical or spelling errors are non-intentional. Please contact the author of this note directly if you are in need of any clarification.      GURINDER Benavidez CNP      SUBJECTIVE:  Conor Contreras is a 55 year old male who is seen for  left hip pain that started in September of 2024.  The patient has seen orthopedics and informed via his MRI that he has avascular necrosis in his hips.  He has a orthopedic surgical consult next month.  He is concerned that this morning he woke up and could not walk without the use of assistive devices.    Rated as: 5 on a scale of 1-10 with weight bearing.     Described as: sharp and dull  Treatment: ibuprofen  Relieving Factors:  Ibuprofen and tizanidine.  Patient has also had chiropractic treatment and physical therapy.   Symptoms have been intermittent since that time.    ROS:  Negative except noted above.     OBJECTIVE:   /75   Pulse 96   Temp 98.1  F (36.7  C) (Oral)   Resp 18   Wt 89.8 kg (198 lb)   SpO2 97%   BMI 27.81 kg/m    GENERAL APPEARANCE: healthy, alert, and no distress   MUSCULOSKELETAL:  LEFT HIP:  Palpation: Non-tender  Range of Motion:  Left Hip  flexion   decreased, painful, extension  decreased, painful external rotation  decreased,  painful, internal rotation  decreased, painful, adduction  decreased, painful, abduction  decreased, painful, Right Hip  FROM  Strength:  right hip 5/5 and 4/5 left hip  GAIT: antalgic with cane assistive device favoring left hip  NEURO: normal strength and tone, sentation intact, reflexes normal, DTR symmetrically normal in upper extremities, and DTR symmetrically normal in lower extremities  SKIN: no suspicious lesions or rashes

## 2025-03-21 RX ORDER — FLUTICASONE PROPIONATE AND SALMETEROL 100; 50 UG/1; UG/1
1 POWDER RESPIRATORY (INHALATION) EVERY 12 HOURS
Qty: 180 EACH | Refills: 0 | OUTPATIENT
Start: 2025-03-21

## 2025-03-21 NOTE — TELEPHONE ENCOUNTER
Forms/Letter Request    Type of form/letter: OTHER: Accomodation Request       Do we have the form/letter: Yes:     Who is the form from? [facility] (if other please explain)    Where did/will the form come from? form was sent via [employer]    When is form/letter needed by: ASAP    How would you like the form/letter returned: Fax : [phone]    Patient Notified form requests are processed in 5-7 business days:Yes    Could we send this information to you in [employer] or would you prefer to receive a phone call?:   Patient would prefer a phone call   Okay to leave a detailed message?: Yes at Cell number on file:    Telephone Information:   Mobile [phone]

## 2025-03-24 ENCOUNTER — VIRTUAL VISIT (OUTPATIENT)
Dept: FAMILY MEDICINE | Facility: CLINIC | Age: 55
End: 2025-03-24
Payer: COMMERCIAL

## 2025-03-24 DIAGNOSIS — T50.905A DRUG-INDUCED OSTEOPOROSIS: Primary | ICD-10-CM

## 2025-03-24 DIAGNOSIS — M81.8 DRUG-INDUCED OSTEOPOROSIS: Primary | ICD-10-CM

## 2025-03-24 NOTE — TELEPHONE ENCOUNTER
Copy in abstract and original in TC copy bin. Form faxed to [facility] [phone] on 3/24/25 at 9:34am. ERROL scanned into chart. Copy of form uploaded into [employer].

## 2025-03-24 NOTE — TELEPHONE ENCOUNTER
Printed out signed patient form--Placed form in provider's #1 bin along with ERROL and Intake sheet to be addressed during today's visit.

## 2025-03-24 NOTE — TELEPHONE ENCOUNTER
Form downloaded from [employer] message, printed, completed and placed in TC basket for faxing.    Please send a copy to patient.

## 2025-03-24 NOTE — PROGRESS NOTES
This is a 55 year old male who is being evaluated via a billable video visit.      How would you like to obtain your AVS? MyChart  If the video  visit is dropped, the invitation should be resent by: text  Will anyone else be joining your video visit? 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  Work accommodation form completed.   - DX Bone Density; Future          Interval History:     Joint or Musculoskeletal Pain  Duration of complaint: chronic   Description:   Location: left hip  Character: Dull ache  Progression of Symptoms: worse  Accompanying Signs & Symptoms: difficulty walking, has to rely on a cane  History: Previous similar pain: YES    Precipitating factors: Trauma or overuse: neither. Secondary to avascular necrosis of left hip to oral corticosteroids.  Alleviating factors: Improved by: rest/inactivity  Therapies Tried and outcome: none. Patient is requesting work accommodation form completion.            Significant Problems:     Patient Active Problem List   Diagnosis    ADD (attention deficit disorder)    HARLAN (obstructive sleep apnea)    Hyperlipidemia LDL goal <160    Generalized anxiety disorder    Benign skin lesion of face    Dysthymia    Allergic rhinitis    Abnormal CXR perihilar nodes on CT chest    Palpitations    Mediastinal lymphadenopathy    Cervical myofascial strain, sequela    Chronic pain of left knee    Pulmonary nodules    Benign prostatic hyperplasia with urinary hesitancy    Acute left-sided low back pain with left-sided sciatica    ILD (interstitial lung disease) (H)              Review of Systems:     CONSTITUTIONAL: NEGATIVE for fever, chills, change in weight  INTEGUMENTARY/SKIN: NEGATIVE for worrisome rashes, moles or lesions  EYES: NEGATIVE for vision changes or  irritation  ENT/MOUTH: NEGATIVE for ear, mouth and throat problems  RESP: NEGATIVE for significant cough or SOB  CV: NEGATIVE for chest pain, palpitations or peripheral edema  GI: NEGATIVE for nausea, abdominal pain, heartburn, or change in bowel habits  : NEGATIVE for frequency, dysuria, or hematuria  MUSCULOSKELETAL:As above.  NEURO: NEGATIVE for weakness, dizziness or paresthesias  ENDOCRINE: NEGATIVE for temperature intolerance, skin/hair changes  HEME: NEGATIVE for bleeding problems  PSYCHIATRIC: NEGATIVE for changes in mood or affect            Medications:     Current Outpatient Medications   Medication Sig Dispense Refill    albuterol (PROAIR HFA/PROVENTIL HFA/VENTOLIN HFA) 108 (90 Base) MCG/ACT inhaler Inhale 1-2 puffs into the lungs every 4 hours as needed for shortness of breath, wheezing or cough 18 g 2    amphetamine-dextroamphetamine (ADDERALL XR) 15 MG per 24 hr capsule TAKE ONE CAPSULE BY MOUTH EVERY MORNING PLEASE MAKE AN APPOINTMENT FOR FUTURE REFILLS  0    atorvastatin (LIPITOR) 10 MG tablet Take 1 tablet (10 mg) by mouth daily. 90 tablet 2    azelastine (ASTELIN) 0.1 % nasal spray USE 2 SPRAYS IN EACH NOSTRIL TWICE DAILY 90 mL 1    baclofen (LIORESAL) 10 MG tablet Take 1 tablet (10 mg) by mouth 3 times daily as needed for muscle spasms. 90 tablet 3    cetirizine (ZYRTEC) 10 MG tablet Take 1 tablet (10 mg) by mouth At Bedtime 30 tablet 11    fluticasone-salmeterol (WIXELA INHUB) 100-50 MCG/ACT inhaler INHALE 1 PUFF INTO THE LUNGS EVERY 12 HOURS 180 each 0    mometasone (NASONEX) 50 MCG/ACT nasal spray Spray 2 sprays into both nostrils daily. SHAKE LIQUID AND USE 17 g 1    montelukast (SINGULAIR) 10 MG tablet TAKE 1 TABLET(10 MG) BY MOUTH AT BEDTIME 30 tablet 5    venlafaxine (EFFEXOR XR) 150 MG 24 hr capsule Take 150 mg by mouth daily      tiZANidine (ZANAFLEX) 4 MG tablet Take 1 tablet (4 mg) by mouth 3 times daily as needed for muscle spasms. 90 tablet 11     No current facility-administered  medications for this visit.             Physical Exam:   Vital signs and physical exam not obtained due to nature of visit.          Data:   Epic reviewed.    Disposition:  Follow-up in 12 weeks or as needed.    Rocky Yi MD  Internal Medicine  [facility] Team        Video-Visit Details     Type of service:  Video Visit  Video Start Time: 0830  Video End Time: 0845  Originating Location (pt. Location): Home  Distant Location (provider location):  [facility]    Platform used for Video Visit: [employer]

## 2025-03-31 ENCOUNTER — ANCILLARY PROCEDURE (OUTPATIENT)
Dept: BONE DENSITY | Facility: CLINIC | Age: 55
End: 2025-03-31
Attending: INTERNAL MEDICINE
Payer: COMMERCIAL

## 2025-03-31 DIAGNOSIS — T50.905A DRUG-INDUCED OSTEOPOROSIS: ICD-10-CM

## 2025-03-31 DIAGNOSIS — M81.8 DRUG-INDUCED OSTEOPOROSIS: ICD-10-CM

## 2025-03-31 PROCEDURE — 77080 DXA BONE DENSITY AXIAL: CPT | Performed by: RADIOLOGY

## 2025-04-01 ENCOUNTER — TELEPHONE (OUTPATIENT)
Dept: ORTHOPEDICS | Facility: CLINIC | Age: 55
End: 2025-04-01

## 2025-04-01 ENCOUNTER — OFFICE VISIT (OUTPATIENT)
Dept: ORTHOPEDICS | Facility: CLINIC | Age: 55
End: 2025-04-01
Payer: COMMERCIAL

## 2025-04-01 ENCOUNTER — ANCILLARY PROCEDURE (OUTPATIENT)
Dept: GENERAL RADIOLOGY | Facility: CLINIC | Age: 55
End: 2025-04-01
Attending: ORTHOPAEDIC SURGERY
Payer: COMMERCIAL

## 2025-04-01 DIAGNOSIS — M25.552 LEFT HIP PAIN: Primary | ICD-10-CM

## 2025-04-01 DIAGNOSIS — M25.552 LEFT HIP PAIN: ICD-10-CM

## 2025-04-01 DIAGNOSIS — M87.152: Primary | ICD-10-CM

## 2025-04-01 PROCEDURE — 73502 X-RAY EXAM HIP UNI 2-3 VIEWS: CPT | Mod: LT | Performed by: RADIOLOGY

## 2025-04-01 PROCEDURE — 99204 OFFICE O/P NEW MOD 45 MIN: CPT | Mod: GC | Performed by: ORTHOPAEDIC SURGERY

## 2025-04-01 ASSESSMENT — HOOS JR
LYING IN BED (TURNING OVER, MAINTAINING HIP POSITION): SEVERE
RISING FROM SITTING: SEVERE
SITTING: MODERATE
GOING UP OR DOWN STAIRS: SEVERE
WALKING ON UNEVEN SURFACE: SEVERE
HOOS JR TOTAL INTERVAL SCORE: 36.36
BENDING TO THE FLOOR TO PICK UP OBJECT: SEVERE

## 2025-04-01 NOTE — TELEPHONE ENCOUNTER
Procedure: LT  Facility: [facility] or [facility]  Length: 120 minutes  Anesthesia: Choice  Post-op appointments needed: 2 weeks Provider/PA only, 6 weeks with provider only.  Surgery packet/instructions given to patient?  Yes   PT: [facility]  PCP: PAC      Teaching Flowsheet     Visit Type: In Clinic      Total Time Spent: 20 min.    Teaching Topic: Surgery teaching    Surgeon: Dr. Lennon  Type of Anesthesia: Choice    Pertinent Medical History:  Lung Disease  Were medical conditions reviewed and appropriate for location? Yes  BMI:     Person(s) involved in teaching:   Patient  : No.   Verified Patient's Phone Number: YES: see chart    Caregiver//  Name: Lesly  Phone Number: in chart   Relationship: Wife  Consent to Communicate on file: Yes  Authorization to Share Protected Health Information- Person to person communication signed by patient and authorized the following person or people:      Motivation Level:  Asks Questions: Yes  Eager to Learn: Yes  Cooperative: Yes  Receptive (willing/able to accept information): Yes  Any cultural factors/[employer] beliefs that may influence understanding or compliance? No     Patient demonstrates understanding of the following:  Reason for the appointment, diagnosis and treatment plan: Yes  Knowledge of proper use of medications and conditions for which they are ordered (with special attention to potential side effects or drug interactions): Yes  Which situations necessitate calling provider and whom to contact: Yes     Teaching Concerns Addressed:   Proper use and care of medical equip, care aids, etc.: Yes  Nutritional needs and diet plan: Yes  Pain management techniques: NA  Wound Care: Yes  How and/when to access community resources: Yes  Need for pre-op with in 30 days: YES, will be done with PCP. I asked them to ensure they go over their daily medications during this visit and discuss what medications need to be stopped before surgery and when. If you  are doing a pre-op with your PCP and they are not within the [facility], I ask them to fax it to our pre-op office. Patient verbalized understanding.       Does patient have difficulty getting a ride to appointments (post-ops, PT/OT): No  Patient's plan after discharge: home with family or spouse     Instructional Materials Used/Given:  two bottles of chlorhexidine soap and a surgery packet given to patient in clinic. Instructed patient to buy or get two 8 ounces bottles of antiseptic surgical soap called 4% CHG. Common name brand of this soap are Hibiclens and Exidine. I told them they can find this at their local pharmacy, clinic or retail store. If they have trouble finding it, I told them to ask their pharmacist to help them find a substitute.   - Important Contact Info/Phone Numbers: emphasizing clinic number [phone] and after hours number [phone]  - Map/location of surgery and follow-up appointments  - Showering instructions  - Stoplight Tool     -Next step: schedule a surgery date.    Jamila Victoria RN

## 2025-04-01 NOTE — LETTER
4/1/2025      Conor Contreras  [address] [address]      Dear Colleague,    Thank you for referring your patient, Conor Contreras, to the [facility]. Please see a copy of my visit note below.    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 Lumbar spondylosis with radicular pain with response to MARIANNE.    Plan:  Patient to discuss treatment options (conservative management, core decompression, OSMAN) with family and reach out to clinic if interested in OSMAN. Will refer to partner if patient desires treatment with core decompression. Continued symptomatic management with cane/OTC for current pain. Discussed that operative management of the hip would not resolve DJD in the spine.     Chief Complaint: Pain of the Left Hip (AVN of left hip )    Physician:  Favian Pozo    HPI: Conor Contreras is a 55 year old male who presents today for evaluation of left hip. Patient reports acute onset left hip pain that began this fall 3 months after falling ill with COVID, non hospitalized. Pain is located along anterior groin, laterally, posterior buttock. No history trauma/surgery. Patient unable to walk for more than one block without extreme pain currently, formerly walked 3+ miles without pain. Using bilateral canes with some relief.     No heavy alcohol use (6-8 drinks/week), no heavy steroid use (inhaled steroid for asthma, recent medrol dose packs), no chemo exposure.     Patient was seen by OSH, [facility], for back/hip pain. Received IA steroid injection to the hip with 50% relief of the symptoms. Also received MARIANNE lumbar injection with again 50% relief of his back symptoms. Does have radicular pain shooting past his foot. No currently back pain.     Location of symptoms:  anterior, buttock, lateral   Onset: August 2024  Duration of symptoms: 7 months  Quality of symptoms: aching/shooting page  Severity:  6/10  Alleviating: activity modification  Exacerbating: activities, walking, sleep  Previous Treatments: Previous treatments include activity modification, oral pain medication    ROSIE Jr: 36.36  PROMIS Mental: (Patient-Rptd) (P) 9  PROMIS Physical: (Patient-Rptd) (P) 10  PROMIS Total: (Patient-Rptd) (P) 19  [facility] Activity Scale: 2-3    MEDICAL HISTORY:   Past Medical History:   Diagnosis Date     ADD (attention deficit disorder) 2003     Depressive disorder      Dysthymia 2002     Elevated lipids      Generalised anxiety disorder 04/25/2012     Lung nodules      HARLAN (obstructive sleep apnea) 06/22/2011    cpap at night       Pertinent negatives:  Patient has no history of DVT or PE. Discussed risk factors.    Medications:     Current Outpatient Medications:      albuterol (PROAIR HFA/PROVENTIL HFA/VENTOLIN HFA) 108 (90 Base) MCG/ACT inhaler, Inhale 1-2 puffs into the lungs every 4 hours as needed for shortness of breath, wheezing or cough, Disp: 18 g, Rfl: 2     alfuzosin ER (UROXATRAL) 10 MG 24 hr tablet, Take 1 tablet (10 mg) by mouth daily., Disp: 90 tablet, Rfl: 3     amphetamine-dextroamphetamine (ADDERALL XR) 15 MG per 24 hr capsule, TAKE ONE CAPSULE BY MOUTH EVERY MORNING PLEASE MAKE AN APPOINTMENT FOR FUTURE REFILLS, Disp: , Rfl: 0     atorvastatin (LIPITOR) 10 MG tablet, Take 1 tablet (10 mg) by mouth daily., Disp: 90 tablet, Rfl: 2     azelastine (ASTELIN) 0.1 % nasal spray, USE 2 SPRAYS IN EACH NOSTRIL TWICE DAILY, Disp: 90 mL, Rfl: 1     baclofen (LIORESAL) 10 MG tablet, Take 1 tablet (10 mg) by mouth 3 times daily as needed for muscle spasms., Disp: 90 tablet, Rfl: 3     cetirizine (ZYRTEC) 10 MG tablet, Take 1 tablet (10 mg) by mouth At Bedtime, Disp: 30 tablet, Rfl: 11     fluticasone-salmeterol (WIXELA INHUB) 100-50 MCG/ACT inhaler, INHALE 1 PUFF INTO THE LUNGS EVERY 12 HOURS, Disp: 180 each, Rfl: 0     mometasone (NASONEX) 50 MCG/ACT nasal spray, Spray 2 sprays into both nostrils daily. SHAKE LIQUID  AND USE, Disp: 17 g, Rfl: 1     montelukast (SINGULAIR) 10 MG tablet, TAKE 1 TABLET(10 MG) BY MOUTH AT BEDTIME, Disp: 30 tablet, Rfl: 5     predniSONE (DELTASONE) 20 MG tablet, Take 2 tablets (40 mg) by mouth every morning., Disp: 10 tablet, Rfl: 0     tiZANidine (ZANAFLEX) 4 MG tablet, Take 1 tablet (4 mg) by mouth 3 times daily as needed for muscle spasms., Disp: 90 tablet, Rfl: 11     venlafaxine (EFFEXOR XR) 150 MG 24 hr capsule, Take 150 mg by mouth daily, Disp: , Rfl:     Allergies: Patient has no known allergies.    SURGICAL HISTORY:   Past Surgical History:   Procedure Laterality Date     ENDOBRONCHIAL ULTRASOUND FLEXIBLE N/A 2015    Procedure: ENDOBRONCHIAL ULTRASOUND FLEXIBLE;  Surgeon: Ramakrishna Williamson MD;  Location:  GI     VASECTOMY         HISTORY:   Family History   Problem Relation Age of Onset     C.A.D. Maternal Grandfather      Alcohol/Drug Maternal Grandfather          from alcoholism     Cerebrovascular Disease Maternal Grandfather      Coronary Artery Disease Maternal Grandfather      Prostate Cancer Maternal Grandfather      Lipids Father      Cancer Maternal Grandmother         Pancreatic     Cancer Paternal Grandmother         Lung, may not have been primary     Heart Disease Paternal Grandfather      Anxiety Disorder Sister      Anxiety Disorder Sister      LUNG DISEASE Maternal Uncle         Asthma versus sarcoid     Asthma Other      Diabetes No family hx of      Hypertension No family hx of      Breast Cancer No family hx of      Cancer - colorectal No family hx of      Connective Tissue Disorder No family hx of      Thyroid Disease No family hx of        SOCIAL HISTORY:   Social History     Tobacco Use     Smoking status: Former     Current packs/day: 0.00     Average packs/day: 0.5 packs/day for 10.0 years (5.0 ttl pk-yrs)     Types: Cigarettes     Start date: 3/18/1991     Quit date: 3/18/2001     Years since quittin.0     Passive exposure: Never     Smokeless  tobacco: Never     Tobacco comments:     QUIT 9 YEARS AGO   Substance Use Topics     Alcohol use: Yes     Comment: 6 to 8 beers per week       REVIEW OF SYSTEMS:  The comprehensive review of systems from the intake form was reviewed with the patient.  No fever, weight change or fatigue. No dry eyes. No oral ulcers, sore throat or voice change. No palpitations, syncope, angina or edema.  No chest pain, excessive sleepiness, shortness of breath or hemoptysis.   No abdominal pain, nausea, vomiting, diarrhea or heartburn.  No skin rash. No focal weakness or numbness. No bleeding or lymphadenopathy. No rhinitis or hives.     Exam:  On physical examination the patient appears the stated age, is in no acute distress, alert and oriented, affect is appropriate, and breathing is non-labored.  Vitals are documented in the EMR and have been reviewed:    There were no vitals taken for this visit.  Data Unavailable  There is no height or weight on file to calculate BMI.    Rises from chair: with some difficulty  Gait: severely antalgic  Gains the exam table: without assist    RIGHT hip subjective:  Flexion: 130  IRF: 20  ERF: 40  Impingement test: -  Tenderness to palpation: -  -Stinchfield    LEFT hip subjective:  Abd: 40  Add: 20  Flexion: 110  IRF: 0  ERF: 30  Impingement test: ++  Tenderness to palpation: greater troch  +Stinchfield    Distal the circulatory, motor, and sensation exam is intact with 5/5 EHL, gastroc-soleus, and tibialis anterior.  Sensation to light touch is intact.  Dorsalis pedis and posterior tibialis pulses are palpable.  There are no sores on the feet, no bruising, and no lymphedema.    Imaging: XR pelvis with left hip with evidence of avascular necrosis with mild collapse. Mild right hip avascular necrosis without evidence of collapse. Mild joint space narrowing on the left. No other osseous abnormality.    Lucina Sadler MD  Ortho Resident    I have personally examined this patient and have reviewed the  clinical presentation and progress note with the resident. I agree with the treatment plan as outlined. The plan was formulated with the resident on the day of the resident's dictation.  We discussed that given the level of symptoms and radiographic changes that further non-operative management in the form injection and/or physical therapist directed exercises is not mandatory. We spent twenty minutes discussing total hip arthroplasty.  We discussed the implants, the procedure, the risks and benefits, and the post-operative course.  We discussed blood clots, blood clots to the lungs, injury to blood vessels and nerves, dislocation, infection, and leg length difference.  We discussed that as part of the routine part of surgical care a qualified assist may finish closing the wound after I have left the room. All the patients questions were answered to the best of my ability.      Again, thank you for allowing me to participate in the care of your patient.        Sincerely,      Sai Lennon MD  Electronically signed

## 2025-04-01 NOTE — PROGRESS NOTES
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 Lumbar spondylosis with radicular pain with response to MARIANNE.    Plan:  Patient to discuss treatment options (conservative management, core decompression, OSMAN) with family and reach out to clinic if interested in OSMAN. Will refer to partner if patient desires treatment with core decompression. Continued symptomatic management with cane/OTC for current pain. Discussed that operative management of the hip would not resolve DJD in the spine.     Chief Complaint: Pain of the Left Hip (AVN of left hip )    Physician:  Favian Pozo    HPI: Conor Contreras is a 55 year old male who presents today for evaluation of left hip. Patient reports acute onset left hip pain that began this fall 3 months after falling ill with COVID, non hospitalized. Pain is located along anterior groin, laterally, posterior buttock. No history trauma/surgery. Patient unable to walk for more than one block without extreme pain currently, formerly walked 3+ miles without pain. Using bilateral canes with some relief.     No heavy alcohol use (6-8 drinks/week), no heavy steroid use (inhaled steroid for asthma, recent medrol dose packs), no chemo exposure.     Patient was seen by OSH, [facility], for back/hip pain. Received IA steroid injection to the hip with 50% relief of the symptoms. Also received MARIANNE lumbar injection with again 50% relief of his back symptoms. Does have radicular pain shooting past his foot. No currently back pain.     Location of symptoms:  anterior, buttock, lateral   Onset: August 2024  Duration of symptoms: 7 months  Quality of symptoms: aching/shooting page  Severity: 6/10  Alleviating: activity modification  Exacerbating: activities, walking, sleep  Previous Treatments: Previous treatments include activity modification, oral pain medication    ROSIE Lund: 36.36  PROMIS Mental: (Patient-Rptd) (P) 9  PROMIS Physical: (Patient-Rptd)  (P) 10  PROMIS Total: (Patient-Rptd) (P) 19  [facility] Activity Scale: 2-3    MEDICAL HISTORY:   Past Medical History:   Diagnosis Date    ADD (attention deficit disorder) 2003    Depressive disorder     Dysthymia 2002    Elevated lipids     Generalised anxiety disorder 04/25/2012    Lung nodules     HARLAN (obstructive sleep apnea) 06/22/2011    cpap at night       Pertinent negatives:  Patient has no history of DVT or PE. Discussed risk factors.    Medications:     Current Outpatient Medications:     albuterol (PROAIR HFA/PROVENTIL HFA/VENTOLIN HFA) 108 (90 Base) MCG/ACT inhaler, Inhale 1-2 puffs into the lungs every 4 hours as needed for shortness of breath, wheezing or cough, Disp: 18 g, Rfl: 2    alfuzosin ER (UROXATRAL) 10 MG 24 hr tablet, Take 1 tablet (10 mg) by mouth daily., Disp: 90 tablet, Rfl: 3    amphetamine-dextroamphetamine (ADDERALL XR) 15 MG per 24 hr capsule, TAKE ONE CAPSULE BY MOUTH EVERY MORNING PLEASE MAKE AN APPOINTMENT FOR FUTURE REFILLS, Disp: , Rfl: 0    atorvastatin (LIPITOR) 10 MG tablet, Take 1 tablet (10 mg) by mouth daily., Disp: 90 tablet, Rfl: 2    azelastine (ASTELIN) 0.1 % nasal spray, USE 2 SPRAYS IN EACH NOSTRIL TWICE DAILY, Disp: 90 mL, Rfl: 1    baclofen (LIORESAL) 10 MG tablet, Take 1 tablet (10 mg) by mouth 3 times daily as needed for muscle spasms., Disp: 90 tablet, Rfl: 3    cetirizine (ZYRTEC) 10 MG tablet, Take 1 tablet (10 mg) by mouth At Bedtime, Disp: 30 tablet, Rfl: 11    fluticasone-salmeterol (WIXELA INHUB) 100-50 MCG/ACT inhaler, INHALE 1 PUFF INTO THE LUNGS EVERY 12 HOURS, Disp: 180 each, Rfl: 0    mometasone (NASONEX) 50 MCG/ACT nasal spray, Spray 2 sprays into both nostrils daily. SHAKE LIQUID AND USE, Disp: 17 g, Rfl: 1    montelukast (SINGULAIR) 10 MG tablet, TAKE 1 TABLET(10 MG) BY MOUTH AT BEDTIME, Disp: 30 tablet, Rfl: 5    predniSONE (DELTASONE) 20 MG tablet, Take 2 tablets (40 mg) by mouth every morning., Disp: 10 tablet, Rfl: 0    tiZANidine (ZANAFLEX) 4 MG  tablet, Take 1 tablet (4 mg) by mouth 3 times daily as needed for muscle spasms., Disp: 90 tablet, Rfl: 11    venlafaxine (EFFEXOR XR) 150 MG 24 hr capsule, Take 150 mg by mouth daily, Disp: , Rfl:     Allergies: Patient has no known allergies.    SURGICAL HISTORY:   Past Surgical History:   Procedure Laterality Date    ENDOBRONCHIAL ULTRASOUND FLEXIBLE N/A 2015    Procedure: ENDOBRONCHIAL ULTRASOUND FLEXIBLE;  Surgeon: Ramakrishna Williamson MD;  Location: [facility]    VASECTOMY         HISTORY:   Family History   Problem Relation Age of Onset    C.A.D. Maternal Grandfather     Alcohol/Drug Maternal Grandfather          from alcoholism    Cerebrovascular Disease Maternal Grandfather     Coronary Artery Disease Maternal Grandfather     Prostate Cancer Maternal Grandfather     Lipids Father     Cancer Maternal Grandmother         Pancreatic    Cancer Paternal Grandmother         Lung, may not have been primary    Heart Disease Paternal Grandfather     Anxiety Disorder Sister     Anxiety Disorder Sister     LUNG DISEASE Maternal Uncle         Asthma versus sarcoid    Asthma Other     Diabetes No family hx of     Hypertension No family hx of     Breast Cancer No family hx of     Cancer - colorectal No family hx of     Connective Tissue Disorder No family hx of     Thyroid Disease No family hx of        SOCIAL HISTORY:   Social History     Tobacco Use    Smoking status: Former     Current packs/day: 0.00     Average packs/day: 0.5 packs/day for 10.0 years (5.0 ttl pk-yrs)     Types: Cigarettes     Start date: 3/18/1991     Quit date: 3/18/2001     Years since quittin.0     Passive exposure: Never    Smokeless tobacco: Never    Tobacco comments:     QUIT 9 YEARS AGO   Substance Use Topics    Alcohol use: Yes     Comment: 6 to 8 beers per week       REVIEW OF SYSTEMS:  The comprehensive review of systems from the intake form was reviewed with the patient.  No fever, weight change or fatigue. No dry eyes. No  oral ulcers, sore throat or voice change. No palpitations, syncope, angina or edema.  No chest pain, excessive sleepiness, shortness of breath or hemoptysis.   No abdominal pain, nausea, vomiting, diarrhea or heartburn.  No skin rash. No focal weakness or numbness. No bleeding or lymphadenopathy. No rhinitis or hives.     Exam:  On physical examination the patient appears the stated age, is in no acute distress, alert and oriented, affect is appropriate, and breathing is non-labored.  Vitals are documented in the EMR and have been reviewed:    There were no vitals taken for this visit.  Data Unavailable  There is no height or weight on file to calculate BMI.    Rises from chair: with some difficulty  Gait: severely antalgic  Gains the exam table: without assist    RIGHT hip subjective:  Flexion: 130  IRF: 20  ERF: 40  Impingement test: -  Tenderness to palpation: -  -Stinchfield    LEFT hip subjective:  Abd: 40  Add: 20  Flexion: 110  IRF: 0  ERF: 30  Impingement test: ++  Tenderness to palpation: greater troch  +Stinchfield    Distal the circulatory, motor, and sensation exam is intact with 5/5 EHL, gastroc-soleus, and tibialis anterior.  Sensation to light touch is intact.  Dorsalis pedis and posterior tibialis pulses are palpable.  There are no sores on the feet, no bruising, and no lymphedema.    Imaging: XR pelvis with left hip with evidence of avascular necrosis with mild collapse. Mild right hip avascular necrosis without evidence of collapse. Mild joint space narrowing on the left. No other osseous abnormality.    Lucina Sadler MD  Ortho Resident

## 2025-04-01 NOTE — NURSING NOTE
Conor Contreras's chief complaint for this visit includes:  Chief Complaint   Patient presents with    Left Hip - Pain     AVN of left hip        Referring [place]  [id number] [place] [address]    There were no vitals taken for this visit.  Data Unavailable   Global Mental Health Score: (Patient-Rptd) (P) 9  Global Physical Health Score: (Patient-Rptd) (P) 10  PROMIS TOTAL - SUBSCORES: (Patient-Rptd) (P) 19  [facility]: 2-3  HOOS Jr. 36.36     Pain increases with: standing up/walking.   Previous surgeries: No  Previous injections within last 6 months: YES - Date: 11/2024 -   Treatments done: Injection- minimal help, PT, Muscle relaxer, ibuprofen, Medrol dose pack.   Imaging completed: XR today, MRI lumbar 11/13/24 shows signs of AVN.   Pain: 6/10  Concerns: What are next steps for him to get back to normal activity of life again without pain.     Juan Daniel Fernandez, ATC

## 2025-04-03 ENCOUNTER — PREP FOR PROCEDURE (OUTPATIENT)
Dept: ORTHOPEDICS | Facility: CLINIC | Age: 55
End: 2025-04-03
Payer: COMMERCIAL

## 2025-04-03 DIAGNOSIS — M16.12 PRIMARY OSTEOARTHRITIS OF LEFT HIP: Primary | ICD-10-CM

## 2025-04-04 NOTE — TELEPHONE ENCOUNTER
Procedure: ARTHROPLASTY, HIP, TOTAL   Date Scheduled: 5-7-25  Facility: Surgery Locations: [facility]  Surgeon: Dr. Lennon   Post-op appointment scheduled: 5-19 & 6-17   needed?: No  Surgery packet/instructions confirmed received?  Yes  Pre op physical/PAC appointment: Dr. Yi @ [facility]  Special Considerations:   Questions for the care team: Jenny Lange, Surgery scheduling coordinator  [phone]

## 2025-04-10 ENCOUNTER — TELEPHONE (OUTPATIENT)
Dept: ORTHOPEDICS | Facility: CLINIC | Age: 55
End: 2025-04-10
Payer: COMMERCIAL

## 2025-04-10 DIAGNOSIS — Z96.642 STATUS POST TOTAL REPLACEMENT OF LEFT HIP: Primary | ICD-10-CM

## 2025-04-21 ENCOUNTER — OFFICE VISIT (OUTPATIENT)
Dept: FAMILY MEDICINE | Facility: CLINIC | Age: 55
End: 2025-04-21
Payer: COMMERCIAL

## 2025-04-21 ENCOUNTER — TELEPHONE (OUTPATIENT)
Dept: FAMILY MEDICINE | Facility: CLINIC | Age: 55
End: 2025-04-21

## 2025-04-21 VITALS
TEMPERATURE: 97.7 F | WEIGHT: 196.6 LBS | HEART RATE: 97 BPM | DIASTOLIC BLOOD PRESSURE: 79 MMHG | HEIGHT: 70 IN | RESPIRATION RATE: 14 BRPM | OXYGEN SATURATION: 97 % | BODY MASS INDEX: 28.15 KG/M2 | SYSTOLIC BLOOD PRESSURE: 131 MMHG

## 2025-04-21 DIAGNOSIS — T38.0X5A STEROID-INDUCED OSTEOPENIA: ICD-10-CM

## 2025-04-21 DIAGNOSIS — J45.40 MODERATE PERSISTENT ASTHMA WITHOUT COMPLICATION: ICD-10-CM

## 2025-04-21 DIAGNOSIS — Z01.818 PREOP GENERAL PHYSICAL EXAM: Primary | ICD-10-CM

## 2025-04-21 DIAGNOSIS — M85.80 STEROID-INDUCED OSTEOPENIA: ICD-10-CM

## 2025-04-21 LAB
BASOPHILS # BLD AUTO: 0 10E3/UL (ref 0–0.2)
BASOPHILS NFR BLD AUTO: 1 %
EOSINOPHIL # BLD AUTO: 0.3 10E3/UL (ref 0–0.7)
EOSINOPHIL NFR BLD AUTO: 4 %
ERYTHROCYTE [DISTWIDTH] IN BLOOD BY AUTOMATED COUNT: 11.7 % (ref 10–15)
HCT VFR BLD AUTO: 40.4 % (ref 40–53)
HGB BLD-MCNC: 13.4 G/DL (ref 13.3–17.7)
IMM GRANULOCYTES # BLD: 0 10E3/UL
IMM GRANULOCYTES NFR BLD: 0 %
LYMPHOCYTES # BLD AUTO: 1.4 10E3/UL (ref 0.8–5.3)
LYMPHOCYTES NFR BLD AUTO: 22 %
MCH RBC QN AUTO: 29.5 PG (ref 26.5–33)
MCHC RBC AUTO-ENTMCNC: 33.2 G/DL (ref 31.5–36.5)
MCV RBC AUTO: 89 FL (ref 78–100)
MONOCYTES # BLD AUTO: 0.5 10E3/UL (ref 0–1.3)
MONOCYTES NFR BLD AUTO: 8 %
NEUTROPHILS # BLD AUTO: 4.3 10E3/UL (ref 1.6–8.3)
NEUTROPHILS NFR BLD AUTO: 66 %
PLATELET # BLD AUTO: 151 10E3/UL (ref 150–450)
RBC # BLD AUTO: 4.54 10E6/UL (ref 4.4–5.9)
WBC # BLD AUTO: 6.5 10E3/UL (ref 4–11)

## 2025-04-21 PROCEDURE — 85025 COMPLETE CBC W/AUTO DIFF WBC: CPT | Performed by: INTERNAL MEDICINE

## 2025-04-21 PROCEDURE — 80053 COMPREHEN METABOLIC PANEL: CPT | Performed by: INTERNAL MEDICINE

## 2025-04-21 PROCEDURE — 36415 COLL VENOUS BLD VENIPUNCTURE: CPT | Performed by: INTERNAL MEDICINE

## 2025-04-21 ASSESSMENT — PAIN SCALES - GENERAL: PAINLEVEL_OUTOF10: MODERATE PAIN (5)

## 2025-04-21 NOTE — PROGRESS NOTES
Preoperative Evaluation  [address] [address]  Phone: [phone]  Primary Provider: Rocky Yi MD  Pre-op Performing Provider: Rocky Yi MD  Apr 21, 2025   {Provider  Link to PREOP SmartSet  REQUIRED to apply standard patient instructions and medication directions to the AVS :304412}  {ROOMER review and update patient entered surgical information if needed :314853}        4/20/2025   Surgical Information   What procedure is being done? Left hip replacement surgery   Facility or Hospital where procedure/surgery will be performed: [facility]   Who is doing the procedure / surgery? Dr. Sai Lennon   Date of surgery / procedure: 05/07/2025   Time of surgery / procedure: unknown   Where do you plan to recover after surgery? at home with family     Fax number for surgical facility: {:[phone]}    {Provider Charting Preference for Preop :068365}    Tessa Couch is a 55 year old, presenting for the following:  Pre-Op Exam          4/21/2025     1:20 PM   Additional Questions   Roomed by fay GAINES: ***          4/20/2025   Pre-Op Questionnaire   Have you ever had a heart attack or stroke? No   Have you ever had surgery on your heart or blood vessels, such as a stent placement, a coronary artery bypass, or surgery on an artery in your head, neck, heart, or legs? No   Do you have chest pain with activity? No   Do you have a history of heart failure? No   Do you currently have a cold, bronchitis or symptoms of other infection? No   Do you have a cough, shortness of breath, or wheezing? No   Do you or anyone in your family have previous history of blood clots? No   Do you or does anyone in your family have a serious bleeding problem such as prolonged bleeding following surgeries or cuts? No   Have you ever had problems with anemia or been told to take iron pills? No   Have you had any abnormal blood loss  such as black, tarry or bloody stools? No   Have you ever had a blood transfusion? No   Are you willing to have a blood transfusion if it is medically needed before, during, or after your surgery? Yes   Have you or any of your relatives ever had problems with anesthesia? No   Do you have sleep apnea, excessive snoring or daytime drowsiness? (!) YES   Do you have a CPAP machine? Yes   Do you have any artifical heart valves or other implanted medical devices like a pacemaker, defibrillator, or continuous glucose monitor? No   Do you have artificial joints? No   Are you allergic to latex? No     Advance Care Planning  {The storyboard will display whether the patient has ACP docs on file. Hover over the Code section in the storyboard to access the ACP documents. :748499}  {(AWV REQUIRED) Advance Care Planning Reviewed:049256}    Preoperative Review of   {Mnpmpreport:520042}  {Review MNPMP for all patients per [facility] MNPMP Profile:746324}    {Chronic problem details (Optional) :713225}    Patient Active Problem List    Diagnosis Date Noted    ILD (interstitial lung disease) (H) 12/05/2024     Priority: Medium    Acute left-sided low back pain with left-sided sciatica 10/10/2024     Priority: Medium    Benign prostatic hyperplasia with urinary hesitancy 06/25/2024     Priority: Medium    Pulmonary nodules 12/05/2023     Priority: Medium    Chronic pain of left knee 08/15/2023     Priority: Medium    Cervical myofascial strain, sequela 07/25/2018     Priority: Medium    Mediastinal lymphadenopathy 07/19/2018     Priority: Medium    Palpitations 04/27/2018     Priority: Medium    Abnormal CXR perihilar nodes on CT chest 12/10/2014     Priority: Medium    Allergic rhinitis 07/09/2014     Priority: Medium    Dysthymia      Priority: Medium    Benign skin lesion of face 06/01/2012     Priority: Medium    Generalized anxiety disorder 04/25/2012     Priority: Medium     Diagnosis updated by automated process. Provider to review  and confirm.      Hyperlipidemia LDL goal <160 10/27/2011     Priority: Medium    HARLAN (obstructive sleep apnea) 06/22/2011     Priority: Medium     Sleep study done at [facility] on 6/1/2011 (181#)-AHI 30.5, RDI 34.9,  lowest oxygen saturation was -%, CPAP 6cm/H20      ADD (attention deficit disorder)      Priority: Medium      Past Medical History:   Diagnosis Date    ADD (attention deficit disorder) 2003    Depressive disorder     Dysthymia 2002    Elevated lipids     Generalised anxiety disorder 04/25/2012    Lung nodules     HARLAN (obstructive sleep apnea) 06/22/2011    cpap at night     Past Surgical History:   Procedure Laterality Date    ENDOBRONCHIAL ULTRASOUND FLEXIBLE N/A 1/16/2015    Procedure: ENDOBRONCHIAL ULTRASOUND FLEXIBLE;  Surgeon: Ramakrishna Williamson MD;  Location:  GI    VASECTOMY  2005     Current Outpatient Medications   Medication Sig Dispense Refill    albuterol (PROAIR HFA/PROVENTIL HFA/VENTOLIN HFA) 108 (90 Base) MCG/ACT inhaler Inhale 1-2 puffs into the lungs every 4 hours as needed for shortness of breath, wheezing or cough 18 g 2    amphetamine-dextroamphetamine (ADDERALL XR) 15 MG per 24 hr capsule TAKE ONE CAPSULE BY MOUTH EVERY MORNING PLEASE MAKE AN APPOINTMENT FOR FUTURE REFILLS  0    atorvastatin (LIPITOR) 10 MG tablet Take 1 tablet (10 mg) by mouth daily. 90 tablet 2    azelastine (ASTELIN) 0.1 % nasal spray USE 2 SPRAYS IN EACH NOSTRIL TWICE DAILY 90 mL 1    baclofen (LIORESAL) 10 MG tablet Take 1 tablet (10 mg) by mouth 3 times daily as needed for muscle spasms. 90 tablet 3    cetirizine (ZYRTEC) 10 MG tablet Take 1 tablet (10 mg) by mouth At Bedtime 30 tablet 11    fluticasone-salmeterol (WIXELA INHUB) 100-50 MCG/ACT inhaler INHALE 1 PUFF INTO THE LUNGS EVERY 12 HOURS 180 each 0    mometasone (NASONEX) 50 MCG/ACT nasal spray Spray 2 sprays into both nostrils daily. SHAKE LIQUID AND USE 17 g 1    montelukast (SINGULAIR) 10 MG tablet TAKE 1 TABLET(10 MG) BY MOUTH AT  "BEDTIME 30 tablet 5    tiZANidine (ZANAFLEX) 4 MG tablet Take 1 tablet (4 mg) by mouth 3 times daily as needed for muscle spasms. 90 tablet 11    venlafaxine (EFFEXOR XR) 150 MG 24 hr capsule Take 150 mg by mouth daily         No Known Allergies     Social History     Tobacco Use    Smoking status: Former     Current packs/day: 0.00     Average packs/day: 0.5 packs/day for 10.0 years (5.0 ttl pk-yrs)     Types: Cigarettes     Start date: 3/18/1991     Quit date: 3/18/2001     Years since quittin.1     Passive exposure: Never    Smokeless tobacco: Never    Tobacco comments:     QUIT 9 YEARS AGO   Substance Use Topics    Alcohol use: Yes     Comment: 6 to 8 beers per week     {FAMILY HISTORY (Optional):603420017}  History   Drug Use No     Comment: h/o drugs, denies past 13 yrs           {ROS Picklists (Optional):622135}    Objective    There were no vitals taken for this visit.   Estimated body mass index is 27.81 kg/m  as calculated from the following:    Height as of 24: 1.797 m (5' 10.75\").    Weight as of 3/20/25: 89.8 kg (198 lb).  Physical Exam  {Exam List :615205}    Recent Labs   Lab Test 24  0858   HGB 13.7         POTASSIUM 4.2   CR 0.81        Diagnostics  {LABS:094523}   {EK}    Revised Cardiac Risk Index (RCRI)  The patient has the following serious cardiovascular risks for perioperative complications:  {PREOP REVISED CARDIAC RISK INDEX (RCRI) :097701}     RCRI Interpretation: {REVISED CARDIAC RISK INTERPRETATION :432503}         Signed Electronically by: Rocky Yi MD  A copy of this evaluation report is provided to the requesting physician.    {Provider Resources  Preop [facility] Preop Guidelines  Revised Cardiac Risk Index :256283}   {Email feedback regarding this note to [email or web address]   :796103}  " suspicious lesions or rashes  NEURO: Normal strength and tone, mentation intact and speech normal  PSYCH: mentation appears normal, affect normal/bright    Diagnostics  EKG shows normal sinus rhythm.  4/22/2025      Component Value Flag Ref Range Units Status   Sodium 140  135 - 145 mmol/L Final   Potassium 4.0  3.4 - 5.3 mmol/L Final   Carbon Dioxide (CO2) 26  22 - 29 mmol/L Final   Anion Gap 15  7 - 15 mmol/L Final   Urea Nitrogen 23.0  High  6.0 - 20.0 mg/dL Final   Creatinine 0.82  0.67 - 1.17 mg/dL Final   GFR Estimate >90  >60 mL/min/1.73m2 Final   Comment:   eGFR calculated using 2021 CKD-EPI equation.   Calcium 9.9  8.8 - 10.4 mg/dL Final   Chloride 99  98 - 107 mmol/L Final   Glucose 97  70 - 99 mg/dL Final   Alkaline Phosphatase 153  High  40 - 150 U/L Final   AST 35  0 - 45 U/L Final   ALT 55  0 - 70 U/L Final   Protein Total 8.2  6.4 - 8.3 g/dL Final   Albumin 4.5  3.5 - 5.2 g/dL Final   Bilirubin Total 0.2  <=1.2 mg/dL Final   4/21/2025      Component Value Flag Ref Range Units Status   WBC Count 6.5  4.0 - 11.0 10e3/uL Final   RBC Count 4.54  4.40 - 5.90 10e6/uL Final   Hemoglobin 13.4  13.3 - 17.7 g/dL Final   Hematocrit 40.4  40.0 - 53.0 % Final   MCV 89  78 - 100 fL Final   MCH 29.5  26.5 - 33.0 pg Final   MCHC 33.2  31.5 - 36.5 g/dL Final   RDW 11.7  10.0 - 15.0 % Final   Platelet Count 151  150 - 450 10e3/uL Final   % Neutrophils 66   % Final   % Lymphocytes 22   % Final   % Monocytes 8   % Final   % Eosinophils 4   % Final   % Basophils 1   % Final   % Immature Granulocytes 0   % Final   Absolute Neutrophils 4.3  1.6 - 8.3 10e3/uL Final   Absolute Lymphocytes 1.4  0.8 - 5.3 10e3/uL Final   Absolute Monocytes 0.5  0.0 - 1.3 10e3/uL Final   Absolute Eosinophils 0.3  0.0 - 0.7 10e3/uL Final   Absolute Basophils 0.0  0.0 - 0.2 10e3/uL Final   Absolute Immature Granulocytes 0.0  <=0.4 10e3/uL Final     ASSESSMENT/PLAN  Preop general physical exam  Revised Cardiac Risk Index (RCRI)  The patient has the  following serious cardiovascular risks for perioperative complications:   - No serious cardiac risks = 0 points   RCRI Interpretation: 0 points: Class I (very low risk - 0.4% complication rate)  Patient is cleared for surgery.  - EKG 12-lead complete w/read - Clinics  - CBC with platelets and differential  - Comprehensive metabolic panel    Steroid-induced osteopenia  - Adult Endocrinology  Referral; Future    Moderate persistent asthma without complication  - tiotropium-olodaterol 2.5-2.5 MCG/ACT AERS; Inhale 2 puffs into the lungs daily.  - dupilumab (DUPIXENT) 300 MG/2ML prefilled pen; Inject 4 mLs (600 mg) subcutaneously See Admin Instructions for 1 dose. followed by 300 mg every other week.        Signed Electronically by: Rocky Yi MD  A copy of this evaluation report is provided to the requesting physician.

## 2025-04-21 NOTE — H&P (VIEW-ONLY)
Preoperative Evaluation  [address] [address]  Phone: [phone]  Primary Provider: Rocky Yi MD  Pre-op Performing Provider: Rocky Yi MD  Apr 21, 2025 4/20/2025   Surgical Information   What procedure is being done? Left hip replacement surgery   Facility or Hospital where procedure/surgery will be performed: [facility]   Who is doing the procedure / surgery? Dr. Sai Lennon   Date of surgery / procedure: 05/07/2025   Time of surgery / procedure: unknown   Where do you plan to recover after surgery? at home with family   Fax number for surgical facility: Note does not need to be faxed, will be available electronically in Epic.    History of Present Illness                This is a 55 year old male who comes in today for a preoperative evaluation. Mr. Contreras has avascular necrosis of the left hip. He is scheduled to undergo a left total hip arthroplasty on May 7, 2025.        4/20/2025   Pre-Op Questionnaire   Have you ever had a heart attack or stroke? No   Have you ever had surgery on your heart or blood vessels, such as a stent placement, a coronary artery bypass, or surgery on an artery in your head, neck, heart, or legs? No   Do you have chest pain with activity? No   Do you have a history of heart failure? No   Do you currently have a cold, bronchitis or symptoms of other infection? No   Do you have a cough, shortness of breath, or wheezing? No   Do you or anyone in your family have previous history of blood clots? No   Do you or does anyone in your family have a serious bleeding problem such as prolonged bleeding following surgeries or cuts? No   Have you ever had problems with anemia or been told to take iron pills? No   Have you had any abnormal blood loss such as black, tarry or bloody stools? No   Have you ever had a blood transfusion? No   Are you willing to have a blood  transfusion if it is medically needed before, during, or after your surgery? Yes   Have you or any of your relatives ever had problems with anesthesia? No   Do you have sleep apnea, excessive snoring or daytime drowsiness? (!) YES   Do you have a CPAP machine? Yes   Do you have any artifical heart valves or other implanted medical devices like a pacemaker, defibrillator, or continuous glucose monitor? No   Do you have artificial joints? No   Are you allergic to latex? No     Advance Care Planning: Patient wants FULL CODE.    Preoperative Review of    reviewed - no record of controlled substances prescribed.      Patient Active Problem List    Diagnosis Date Noted    ILD (interstitial lung disease) (H) 12/05/2024     Priority: Medium    Acute left-sided low back pain with left-sided sciatica 10/10/2024     Priority: Medium    Benign prostatic hyperplasia with urinary hesitancy 06/25/2024     Priority: Medium    Pulmonary nodules 12/05/2023     Priority: Medium    Chronic pain of left knee 08/15/2023     Priority: Medium    Cervical myofascial strain, sequela 07/25/2018     Priority: Medium    Mediastinal lymphadenopathy 07/19/2018     Priority: Medium    Palpitations 04/27/2018     Priority: Medium    Abnormal CXR perihilar nodes on CT chest 12/10/2014     Priority: Medium    Allergic rhinitis 07/09/2014     Priority: Medium    Dysthymia      Priority: Medium    Benign skin lesion of face 06/01/2012     Priority: Medium    Generalized anxiety disorder 04/25/2012     Priority: Medium     Diagnosis updated by automated process. Provider to review and confirm.      Hyperlipidemia LDL goal <160 10/27/2011     Priority: Medium    HARLAN (obstructive sleep apnea) 06/22/2011     Priority: Medium     Sleep study done at [facility] on 6/1/2011 (181#)-AHI 30.5, RDI 34.9,  lowest oxygen saturation was -%, CPAP 6cm/H20      ADD (attention deficit disorder)      Priority: Medium      Past Medical History:   Diagnosis  Date    ADD (attention deficit disorder) 2003    Depressive disorder     Dysthymia 2002    Elevated lipids     Generalised anxiety disorder 04/25/2012    Lung nodules     HARLAN (obstructive sleep apnea) 06/22/2011    cpap at night     Past Surgical History:   Procedure Laterality Date    ENDOBRONCHIAL ULTRASOUND FLEXIBLE N/A 1/16/2015    Procedure: ENDOBRONCHIAL ULTRASOUND FLEXIBLE;  Surgeon: Ramakrishna Williamson MD;  Location:  GI    VASECTOMY  2005     Current Outpatient Medications   Medication Sig Dispense Refill    albuterol (PROAIR HFA/PROVENTIL HFA/VENTOLIN HFA) 108 (90 Base) MCG/ACT inhaler Inhale 1-2 puffs into the lungs every 4 hours as needed for shortness of breath, wheezing or cough 18 g 2    amphetamine-dextroamphetamine (ADDERALL XR) 15 MG per 24 hr capsule TAKE ONE CAPSULE BY MOUTH EVERY MORNING PLEASE MAKE AN APPOINTMENT FOR FUTURE REFILLS  0    atorvastatin (LIPITOR) 10 MG tablet Take 1 tablet (10 mg) by mouth daily. 90 tablet 2    azelastine (ASTELIN) 0.1 % nasal spray USE 2 SPRAYS IN EACH NOSTRIL TWICE DAILY 90 mL 1    baclofen (LIORESAL) 10 MG tablet Take 1 tablet (10 mg) by mouth 3 times daily as needed for muscle spasms. 90 tablet 3    cetirizine (ZYRTEC) 10 MG tablet Take 1 tablet (10 mg) by mouth At Bedtime 30 tablet 11    fluticasone-salmeterol (WIXELA INHUB) 100-50 MCG/ACT inhaler INHALE 1 PUFF INTO THE LUNGS EVERY 12 HOURS 180 each 0    mometasone (NASONEX) 50 MCG/ACT nasal spray Spray 2 sprays into both nostrils daily. SHAKE LIQUID AND USE 17 g 1    montelukast (SINGULAIR) 10 MG tablet TAKE 1 TABLET(10 MG) BY MOUTH AT BEDTIME 30 tablet 5    tiZANidine (ZANAFLEX) 4 MG tablet Take 1 tablet (4 mg) by mouth 3 times daily as needed for muscle spasms. 90 tablet 11    venlafaxine (EFFEXOR XR) 150 MG 24 hr capsule Take 150 mg by mouth daily         No Known Allergies     Social History     Tobacco Use    Smoking status: Former     Current packs/day: 0.00     Average packs/day: 0.5 packs/day for  "10.0 years (5.0 ttl pk-yrs)     Types: Cigarettes     Start date: 3/18/1991     Quit date: 3/18/2001     Years since quittin.1     Passive exposure: Never    Smokeless tobacco: Never    Tobacco comments:     QUIT 9 YEARS AGO   Substance Use Topics    Alcohol use: Yes     Comment: 6 to 8 beers per week         Review of Systems  CONSTITUTIONAL: NEGATIVE for fever, chills, change in weight  INTEGUMENTARY/SKIN: NEGATIVE for worrisome rashes, moles or lesions  EYES: NEGATIVE for vision changes or irritation  ENT/MOUTH: NEGATIVE for ear, mouth and throat problems  RESP: NEGATIVE for significant cough or SOB  BREAST: NEGATIVE for masses, tenderness or discharge  CV: NEGATIVE for chest pain, palpitations or peripheral edema  GI: NEGATIVE for nausea, abdominal pain, heartburn, or change in bowel habits  : NEGATIVE for frequency, dysuria, or hematuria  MUSCULOSKELETAL: NEGATIVE for significant arthralgias or myalgia  NEURO: NEGATIVE for weakness, dizziness or paresthesias  ENDOCRINE: NEGATIVE for temperature intolerance, skin/hair changes  HEME: NEGATIVE for bleeding problems  PSYCHIATRIC: NEGATIVE for changes in mood or affect    Objective    There were no vitals taken for this visit.   Estimated body mass index is 27.81 kg/m  as calculated from the following:    Height as of 24: 1.797 m (5' 10.75\").    Weight as of 3/20/25: 89.8 kg (198 lb).  Physical Exam  GENERAL: alert and no distress  EYES: Eyes grossly normal to inspection, PERRL and conjunctivae and sclerae normal  HENT: ear canals and TM's normal, nose and mouth without ulcers or lesions  NECK: no adenopathy, no asymmetry, masses, or scars  RESP: lungs clear to auscultation - no rales, rhonchi or wheezes  CV: regular rate and rhythm, normal S1 S2, no S3 or S4, no murmur, click or rub, no peripheral edema  ABDOMEN: soft, nontender, no hepatosplenomegaly, no masses and bowel sounds normal  MS: no gross musculoskeletal defects noted, no edema  SKIN: no " suspicious lesions or rashes  NEURO: Normal strength and tone, mentation intact and speech normal  PSYCH: mentation appears normal, affect normal/bright    Diagnostics  EKG shows normal sinus rhythm.  4/22/2025      Component Value Flag Ref Range Units Status   Sodium 140  135 - 145 mmol/L Final   Potassium 4.0  3.4 - 5.3 mmol/L Final   Carbon Dioxide (CO2) 26  22 - 29 mmol/L Final   Anion Gap 15  7 - 15 mmol/L Final   Urea Nitrogen 23.0  High  6.0 - 20.0 mg/dL Final   Creatinine 0.82  0.67 - 1.17 mg/dL Final   GFR Estimate >90  >60 mL/min/1.73m2 Final   Comment:   eGFR calculated using 2021 CKD-EPI equation.   Calcium 9.9  8.8 - 10.4 mg/dL Final   Chloride 99  98 - 107 mmol/L Final   Glucose 97  70 - 99 mg/dL Final   Alkaline Phosphatase 153  High  40 - 150 U/L Final   AST 35  0 - 45 U/L Final   ALT 55  0 - 70 U/L Final   Protein Total 8.2  6.4 - 8.3 g/dL Final   Albumin 4.5  3.5 - 5.2 g/dL Final   Bilirubin Total 0.2  <=1.2 mg/dL Final   4/21/2025      Component Value Flag Ref Range Units Status   WBC Count 6.5  4.0 - 11.0 10e3/uL Final   RBC Count 4.54  4.40 - 5.90 10e6/uL Final   Hemoglobin 13.4  13.3 - 17.7 g/dL Final   Hematocrit 40.4  40.0 - 53.0 % Final   MCV 89  78 - 100 fL Final   MCH 29.5  26.5 - 33.0 pg Final   MCHC 33.2  31.5 - 36.5 g/dL Final   RDW 11.7  10.0 - 15.0 % Final   Platelet Count 151  150 - 450 10e3/uL Final   % Neutrophils 66   % Final   % Lymphocytes 22   % Final   % Monocytes 8   % Final   % Eosinophils 4   % Final   % Basophils 1   % Final   % Immature Granulocytes 0   % Final   Absolute Neutrophils 4.3  1.6 - 8.3 10e3/uL Final   Absolute Lymphocytes 1.4  0.8 - 5.3 10e3/uL Final   Absolute Monocytes 0.5  0.0 - 1.3 10e3/uL Final   Absolute Eosinophils 0.3  0.0 - 0.7 10e3/uL Final   Absolute Basophils 0.0  0.0 - 0.2 10e3/uL Final   Absolute Immature Granulocytes 0.0  <=0.4 10e3/uL Final     ASSESSMENT/PLAN  Preop general physical exam  Revised Cardiac Risk Index (RCRI)  The patient has the  following serious cardiovascular risks for perioperative complications:   - No serious cardiac risks = 0 points   RCRI Interpretation: 0 points: Class I (very low risk - 0.4% complication rate)  Patient is cleared for surgery.  - EKG 12-lead complete w/read - Clinics  - CBC with platelets and differential  - Comprehensive metabolic panel    Steroid-induced osteopenia  - Adult Endocrinology  Referral; Future    Moderate persistent asthma without complication  - tiotropium-olodaterol 2.5-2.5 MCG/ACT AERS; Inhale 2 puffs into the lungs daily.  - dupilumab (DUPIXENT) 300 MG/2ML prefilled pen; Inject 4 mLs (600 mg) subcutaneously See Admin Instructions for 1 dose. followed by 300 mg every other week.        Signed Electronically by: Rocky Yi MD  A copy of this evaluation report is provided to the requesting physician.

## 2025-04-21 NOTE — PATIENT INSTRUCTIONS
At [facility], we strive to deliver an exceptional experience to you, every time we see you. If you receive a survey, please let us know what we are doing well and/or what we could improve upon, as we do value your feedback.  If you have MyChart, you can expect to receive results automatically within 24 hours of their completion.  Your provider will send a note interpreting your results as well.   If you do not have MyChart, you should receive your results in about a week by mail.    Your care team:                            Family Medicine Internal Medicine   MD Rocky Rodriguez, MD Cherry Lujan, MD Heron Kaur, MD Danelle Díaz, PA-C    Eusebio Birmingham, MD Pediatrics   Janice Smith, MD Savita Crawford, GURINDER Moya CNP Nargis Escalante, MD Rosalinda Rutledge, MD Caroline Deleon, CNP     Bessie Adams, PA-C Same-Day Provider (No follow-up visits)   GURINDER Moore, JLUIS Smith, PA-C    Paz Palma PA-C     Clinic hours: Monday - Thursday 7 am-6 pm; Fridays 7 am-5 pm.   Urgent care: Monday - Friday 10 am- 8 pm; Saturday and Sunday 9 am-5 pm.    Clinic: [phone]       [facility]: Monday - Thursday 8 am - 7 pm; Friday 8 am - 6 pm  [facility]: [phone]

## 2025-04-21 NOTE — TELEPHONE ENCOUNTER
PA Initiation    Medication: DUPIXENT 300 MG/2ML SC SOAJ  Insurance Company: [employer] - Phone [phone] Fax [phone]  Pharmacy Filling the Rx: [facility] - [place], [place] - [facility] [address]  Filling Pharmacy Phone: [phone]  Filling Pharmacy Fax: [phone]  Start Date: 4/21/2025  KELSEY (Zambrano: [id number])  PA Case ID #: [id number]        Thank You,     Bessie Rubin [facility]  Specialty Pharmacy Clinic [facility] Specialty  [email or web address]  [email or web address]  Phone: [phone]  Fax: [phone]

## 2025-04-22 ENCOUNTER — MYC MEDICAL ADVICE (OUTPATIENT)
Dept: FAMILY MEDICINE | Facility: CLINIC | Age: 55
End: 2025-04-22
Payer: COMMERCIAL

## 2025-04-22 LAB
ALBUMIN SERPL BCG-MCNC: 4.5 G/DL (ref 3.5–5.2)
ALP SERPL-CCNC: 153 U/L (ref 40–150)
ALT SERPL W P-5'-P-CCNC: 55 U/L (ref 0–70)
ANION GAP SERPL CALCULATED.3IONS-SCNC: 15 MMOL/L (ref 7–15)
AST SERPL W P-5'-P-CCNC: 35 U/L (ref 0–45)
BILIRUB SERPL-MCNC: 0.2 MG/DL
BUN SERPL-MCNC: 23 MG/DL (ref 6–20)
CALCIUM SERPL-MCNC: 9.9 MG/DL (ref 8.8–10.4)
CHLORIDE SERPL-SCNC: 99 MMOL/L (ref 98–107)
CREAT SERPL-MCNC: 0.82 MG/DL (ref 0.67–1.17)
EGFRCR SERPLBLD CKD-EPI 2021: >90 ML/MIN/1.73M2
GLUCOSE SERPL-MCNC: 97 MG/DL (ref 70–99)
HCO3 SERPL-SCNC: 26 MMOL/L (ref 22–29)
POTASSIUM SERPL-SCNC: 4 MMOL/L (ref 3.4–5.3)
PROT SERPL-MCNC: 8.2 G/DL (ref 6.4–8.3)
SODIUM SERPL-SCNC: 140 MMOL/L (ref 135–145)

## 2025-04-23 NOTE — TELEPHONE ENCOUNTER
Prior Authorization Approval    Medication: DUPIXENT 300 MG/2ML SC SOAJ  Authorization Effective Date: 4/23/2025  Authorization Expiration Date: 8/28/2025  Approved Dose/Quantity: 8 ML per 28-day supply (one time fill), then 4 ML per 28-day supply thereafter  Reference #: [id number] (Key: [id number])   Insurance Company: [employer] - Phone [phone] Fax [phone]  Expected CoPay: $ 760 (before copay card applied)  CoPay Card Available: Other (see comments)    Financial Assistance Needed: [email or web address]  Which Pharmacy is filling the prescription: [facility] - [place], [place]  [facility] [address]   Pharmacy Notified: Yes - New spec pt  Patient Notified: Yes - MyChart        Thank You,     Agustin Rubin [facility]  Specialty Pharmacy Clinic [facility] Specialty  [email or web address]  [email or web address]  Phone: [phone]  Fax: [phone]

## 2025-04-24 NOTE — PROGRESS NOTES
Ortho Navigator Note      Pre-op Date 4/21/25     Medical Clearance  Cleared      Labs Stable      COVID Test Date No longer indicated      Skin  Intact      Activity: Ambulates independently with 2 canes      Equipment Need Patient will likely need a walker for discharge. Defer to PT/OT for recs.       Meds to Hold Held all supplements 14 days prior to surgery  Ibuprofen-Hold 48 hrs prior to surgery   Psychostimulant- Hold am of surgery   Cetirizine- Hold am of surgery   * Medication recommendations are not intended to be exhaustive; they are limited to common medications that are potentially dangerous if incorrectly managed   NPO Instructions  Instructed to stop solids 8 hr prior to arrival and clears 2 hr prior to arrival.       Pre-op Joint Education Complete? Link sent to patient to review.    Discharge Plan Patient has plan to discharge home on morning of POD 1.   Spouse Lesly will arrive at hospital at 0800 to participate in therapy and discharge education. They will then transport patient home    /Transportation Lesly will be  and transportation.  is physically able to care for patient.      Barriers to Discharge No barriers to discharge.      Additional Info/   Special Needs : Patient had no unanswered questions or concerns.           04/24/25 1056   Discharge Planning   Patient/Family Anticipates Transition to home with family   Concerns to be Addressed no discharge needs identified   Living Arrangements   People in Home spouse   Type of Residence Private Residence   Is your private residence a single family home or apartment? Single family home   Number of Stairs, Within Home, Primary three  (Into home)   Stair Railings, Within Home, Primary railings safe and in good condition   Once home, are you able to live on one level? Yes   Which level? Main Level   Bathroom Shower/Tub Walk-in shower   Equipment Currently Used at Home cane, straight;shower chair   Support System   Support  Systems Spouse/Significant Other   Do you have someone available to stay with you one or two nights once you are home? Yes   Medical Clearance   Date of Physical 04/21/25   It is recommended that you call and check with any specialty providers before surgery to see if you need surgical clearance.  Do you see any specialty providers outside of your primary care provider? No   Blood   Known Bleeding Disorder or Coagulopathy No   Does the patient have any Mormon/cultural preferences related to blood products? No   Education   Has the patient scheduled or completed pre-op total joint education, either in class or online, in the last 12 months? Yes   Patient attended total joint pre-op class/received pre-op teaching  online   Falls Risk   Has the patient been identified as a high falls risk before surgery? (fallen in the last 6 months, history of falls, unsteady gait, or balance issues) No   Did an order get placed to attend outpatient pre-surgery balance evaluation? No   Relationship/Environment   Name(s) of People in Home Lesly

## 2025-04-29 ENCOUNTER — VIRTUAL VISIT (OUTPATIENT)
Dept: FAMILY MEDICINE | Facility: CLINIC | Age: 55
End: 2025-04-29
Payer: COMMERCIAL

## 2025-04-29 ENCOUNTER — TELEPHONE (OUTPATIENT)
Dept: PULMONOLOGY | Facility: CLINIC | Age: 55
End: 2025-04-29

## 2025-04-29 DIAGNOSIS — M87.052 AVASCULAR NECROSIS OF BONE OF LEFT HIP (H): Primary | ICD-10-CM

## 2025-04-29 PROCEDURE — 98005 SYNCH AUDIO-VIDEO EST LOW 20: CPT | Performed by: INTERNAL MEDICINE

## 2025-04-29 RX ORDER — HYDROCODONE BITARTRATE AND ACETAMINOPHEN 5; 325 MG/1; MG/1
1 TABLET ORAL EVERY 8 HOURS PRN
Qty: 15 TABLET | Refills: 0 | Status: SHIPPED | OUTPATIENT
Start: 2025-04-29 | End: 2025-05-04

## 2025-04-29 NOTE — TELEPHONE ENCOUNTER
PFT order has been extended.    Brooklyn Simon LPN  Pulmonary Medicine:  [facility]  Phone: [phone] Fax: [phone]

## 2025-04-29 NOTE — PATIENT INSTRUCTIONS
At [facility], we strive to deliver an exceptional experience to you, every time we see you. If you receive a survey, please let us know what we are doing well and/or what we could improve upon, as we do value your feedback.  If you have MyChart, you can expect to receive results automatically within 24 hours of their completion.  Your provider will send a note interpreting your results as well.   If you do not have MyChart, you should receive your results in about a week by mail.    Your care team:                            Family Medicine Internal Medicine   MD Rocky Rodriguez, MD Cherry Lujan, MD Heron Kaur, MD Danelle Díaz, PA-C    Eusebio Birmingham, MD Pediatrics   Janice Smith, MD Savita Crawford, GURINDER Moya CNP Nargis Escalante, MD Rosalinda Rutledge, MD Caroline Deleon, CNP     Bessie Adams, PA-C Same-Day Provider (No follow-up visits)   GURINDER Moore, JLUIS Smith, PA-C    Paz Palma PA-C     Clinic hours: Monday - Thursday 7 am-6 pm; Fridays 7 am-5 pm.   Urgent care: Monday - Friday 10 am- 8 pm; Saturday and Sunday 9 am-5 pm.    Clinic: [phone]       [facility]: Monday - Thursday 8 am - 7 pm; Friday 8 am - 6 pm  [facility]: [phone]

## 2025-04-29 NOTE — PROGRESS NOTES
This is a 55 year old male who is being evaluated via a billable video visit.      How would you like to obtain your AVS? MyChart  If the video visit is dropped, the invitation should be resent by: text  Will anyone else be joining your video visit? No                                                         [facility] INTERNAL MEDICINE NOTE    Conor Contreras is a 55 year old male who presents to clinic today for the following health issues:    Joint or Musculoskeletal Pain  Duration of complaint: chronic   Description:   Location: left hip  Character: Dull ache  Intensity: moderate, severe  Progression of Symptoms: worse  Accompanying Signs & Symptoms: none  History: Previous similar pain: YES    Precipitating factors: avascular necrosis  Alleviating factors: Improved by: rest/inactivity  Therapies Tried and outcome: NSAID are not effective.     Patient Active Problem List   Diagnosis    ADD (attention deficit disorder)    HARLAN (obstructive sleep apnea)    Hyperlipidemia LDL goal <160    Generalized anxiety disorder    Benign skin lesion of face    Dysthymia    Allergic rhinitis    Abnormal CXR perihilar nodes on CT chest    Palpitations    Mediastinal lymphadenopathy    Cervical myofascial strain, sequela    Chronic pain of left knee    Pulmonary nodules    Benign prostatic hyperplasia with urinary hesitancy    Acute left-sided low back pain with left-sided sciatica     Past Surgical History:   Procedure Laterality Date    ENDOBRONCHIAL ULTRASOUND FLEXIBLE N/A 2015    Procedure: ENDOBRONCHIAL ULTRASOUND FLEXIBLE;  Surgeon: Ramakrishna Williamson MD;  Location: [facility]    VASECTOMY         Social History     Tobacco Use    Smoking status: Former     Current packs/day: 0.00     Average packs/day: 0.5 packs/day for 10.0 years (5.0 ttl pk-yrs)     Types: Cigarettes     Start date: 3/18/1991     Quit date: 3/18/2001     Years since quittin.1     Passive exposure: Never    Smokeless tobacco: Never     Tobacco comments:     QUIT 9 YEARS AGO   Substance Use Topics    Alcohol use: Yes     Comment: 6 to 8 beers per week     Family History   Problem Relation Age of Onset    C.A.D. Maternal Grandfather     Alcohol/Drug Maternal Grandfather          from alcoholism    Cerebrovascular Disease Maternal Grandfather     Coronary Artery Disease Maternal Grandfather     Prostate Cancer Maternal Grandfather     Lipids Father     Cancer Maternal Grandmother         Pancreatic    Cancer Paternal Grandmother         Lung, may not have been primary    Heart Disease Paternal Grandfather     Anxiety Disorder Sister     Anxiety Disorder Sister     LUNG DISEASE Maternal Uncle         Asthma versus sarcoid    Asthma Other     Diabetes No family hx of     Hypertension No family hx of     Breast Cancer No family hx of     Cancer - colorectal No family hx of     Connective Tissue Disorder No family hx of     Thyroid Disease No family hx of          No Known Allergies  Recent Labs   Lab Test 25  1436 24  0858 23  0848 10/12/22  1037 10/12/22  1037 10/28/20  0808 19  0838   LDL  --  148* 120*  --  154* 120* 124*   HDL  --  69 45  --  45 45 39*   TRIG  --  88 97  --  110 102 79   ALT 55 39 36   < > 56 52 50   CR 0.82 0.81 0.91   < > 0.89 0.90 0.88   GFRESTIMATED >90 >90 >90   < > >90 >90 >90   GFRESTBLACK  --   --   --   --   --  >90 >90   POTASSIUM 4.0 4.2 4.2   < > 4.4 3.7 4.0    < > = values in this interval not displayed.      BP Readings from Last 3 Encounters:   25 117/69   25 131/79   25 130/75    Wt Readings from Last 3 Encounters:   25 88.2 kg (194 lb 7.1 oz)   25 89.2 kg (196 lb 9.6 oz)   25 89.8 kg (198 lb)           ROS:  C: NEGATIVE for fever, chills, change in weight  I: NEGATIVE for worrisome rashes, moles or lesions  E: NEGATIVE for vision changes or irritation  E/M: NEGATIVE for ear, mouth and throat problems  R: NEGATIVE for significant cough or SOB  B:  NEGATIVE for masses, tenderness or discharge  CV: NEGATIVE for chest pain, palpitations or peripheral edema  GI: NEGATIVE for nausea, abdominal pain, heartburn, or change in bowel habits  : NEGATIVE for frequency, dysuria, or hematuria  M: As above.  N: NEGATIVE for weakness, dizziness or paresthesias  E: NEGATIVE for temperature intolerance, skin/hair changes  H: NEGATIVE for bleeding problems  P: NEGATIVE for changes in mood or affect    OBJECTIVE:                                                    Vital signs and physical exam not obtained due to nature of visit.    DIAGNOSTICS                                                       EXAM: XR PELVIS AND HIP LEFT 1 VIEW  LOCATION: [facility]  DATE: 4/1/2025     INDICATION:  Left hip pain  COMPARISON: 10/18/2024                                                                      IMPRESSION: Normal alignment. No fracture. Curvilinear sclerosis of both femoral heads is stable and compatible with AVN. There is slight flattening and irregularity of the left femoral head suggesting developing articular surface collapse. Minimal left   hip joint space narrowing. Small chronic accessory ossicle adjacent to the left acetabulum stable. Degenerative changes lumbar spine.    ASSESSMENT/PLAN:                                                      Avascular necrosis of bone of left hip (H)  Patient is scheduled to undergo a left total hip arthroplasty on May 7, 2025. Patient is requesting a more potent analgesic prior to undergoing surgery.  Temporary disability parking form completed.  - HYDROcodone-acetaminophen (NORCO) 5-325 MG tablet; Take 1 tablet by mouth every 8 hours as needed for pain.     Disposition:  Follow-up within 8 weeks.    Rocky Yi MD  [facility]

## 2025-04-29 NOTE — TELEPHONE ENCOUNTER
[facility] Call Center    Phone Message    May a detailed message be left on voicemail: yes     Reason for Call: Order(s): Other:   Reason for requested: FENO & [place]  Date needed: 25  Provider name: Dr. Rice    Pt's appt for pinky and FENO testing have been rescheduled, but current orders will  before then. Needs updated/extended orders.     Action Taken: Other: Pulm    Travel Screening: Not Applicable     Date of Service:

## 2025-04-30 PROBLEM — J84.9 ILD (INTERSTITIAL LUNG DISEASE) (H): Status: RESOLVED | Noted: 2024-12-05 | Resolved: 2025-04-30

## 2025-05-02 RX ORDER — TRAZODONE HYDROCHLORIDE 50 MG/1
50 TABLET ORAL AT BEDTIME
COMMUNITY

## 2025-05-02 RX ORDER — TAMSULOSIN HYDROCHLORIDE 0.4 MG/1
0.4 CAPSULE ORAL AT BEDTIME
COMMUNITY

## 2025-05-05 ENCOUNTER — MYC MEDICAL ADVICE (OUTPATIENT)
Dept: FAMILY MEDICINE | Facility: CLINIC | Age: 55
End: 2025-05-05

## 2025-05-06 ENCOUNTER — ANESTHESIA EVENT (OUTPATIENT)
Dept: SURGERY | Facility: CLINIC | Age: 55
End: 2025-05-06
Payer: COMMERCIAL

## 2025-05-06 NOTE — TELEPHONE ENCOUNTER
Forms/Letter Request    Type of form/letter: Application For Disability Parking Certificate    Do we have the form/letter: Yes: Dr Yi's box    Who is the form from? Patient/[place] Dept of Public [employer]    Where did/will the form come from? Patient attached to a My Chart message    When is form/letter needed by: Not indicated    How would you like the form/letter returned: Kamlesh Carrillo MA/  [facility]   Primary Care

## 2025-05-07 ENCOUNTER — HOSPITAL ENCOUNTER (OUTPATIENT)
Facility: CLINIC | Age: 55
Discharge: HOME | End: 2025-05-07
Attending: ORTHOPAEDIC SURGERY | Admitting: ORTHOPAEDIC SURGERY
Payer: COMMERCIAL

## 2025-05-07 ENCOUNTER — ANESTHESIA (OUTPATIENT)
Dept: SURGERY | Facility: CLINIC | Age: 55
End: 2025-05-07
Payer: COMMERCIAL

## 2025-05-07 VITALS
SYSTOLIC BLOOD PRESSURE: 117 MMHG | DIASTOLIC BLOOD PRESSURE: 69 MMHG | BODY MASS INDEX: 27.84 KG/M2 | HEART RATE: 80 BPM | RESPIRATION RATE: 16 BRPM | TEMPERATURE: 97.9 F | HEIGHT: 70 IN | WEIGHT: 194.45 LBS | OXYGEN SATURATION: 97 %

## 2025-05-07 DIAGNOSIS — Z96.642 S/P TOTAL LEFT HIP ARTHROPLASTY: ICD-10-CM

## 2025-05-07 DIAGNOSIS — Z74.09 IMPAIRED MOBILITY: Primary | ICD-10-CM

## 2025-05-07 LAB
ABO + RH BLD: NORMAL
BLD GP AB SCN SERPL QL: NEGATIVE
GLUCOSE BLDC GLUCOMTR-MCNC: 92 MG/DL (ref 70–99)
SPECIMEN EXP DATE BLD: NORMAL

## 2025-05-07 PROCEDURE — 27130 TOTAL HIP ARTHROPLASTY: CPT | Mod: LT | Performed by: ORTHOPAEDIC SURGERY

## 2025-05-07 PROCEDURE — 272N000001 HC OR GENERAL SUPPLY STERILE: Performed by: ORTHOPAEDIC SURGERY

## 2025-05-07 PROCEDURE — C1776 JOINT DEVICE (IMPLANTABLE): HCPCS | Performed by: ORTHOPAEDIC SURGERY

## 2025-05-07 PROCEDURE — C1713 ANCHOR/SCREW BN/BN,TIS/BN: HCPCS | Performed by: ORTHOPAEDIC SURGERY

## 2025-05-07 PROCEDURE — 258N000003 HC RX IP 258 OP 636: Performed by: PHYSICIAN ASSISTANT

## 2025-05-07 PROCEDURE — 250N000013 HC RX MED GY IP 250 OP 250 PS 637: Performed by: PHYSICIAN ASSISTANT

## 2025-05-07 PROCEDURE — 710N000010 HC RECOVERY PHASE 1, LEVEL 2, PER MIN: Performed by: ORTHOPAEDIC SURGERY

## 2025-05-07 PROCEDURE — 97161 PT EVAL LOW COMPLEX 20 MIN: CPT | Mod: GP

## 2025-05-07 PROCEDURE — 250N000011 HC RX IP 250 OP 636: Performed by: PHYSICIAN ASSISTANT

## 2025-05-07 PROCEDURE — 250N000011 HC RX IP 250 OP 636: Performed by: STUDENT IN AN ORGANIZED HEALTH CARE EDUCATION/TRAINING PROGRAM

## 2025-05-07 PROCEDURE — 250N000013 HC RX MED GY IP 250 OP 250 PS 637: Performed by: STUDENT IN AN ORGANIZED HEALTH CARE EDUCATION/TRAINING PROGRAM

## 2025-05-07 PROCEDURE — 370N000017 HC ANESTHESIA TECHNICAL FEE, PER MIN: Performed by: ORTHOPAEDIC SURGERY

## 2025-05-07 PROCEDURE — 97110 THERAPEUTIC EXERCISES: CPT | Mod: GP

## 2025-05-07 PROCEDURE — 258N000001 HC RX 258: Performed by: ORTHOPAEDIC SURGERY

## 2025-05-07 PROCEDURE — 250N000011 HC RX IP 250 OP 636: Mod: JZ | Performed by: PHYSICIAN ASSISTANT

## 2025-05-07 PROCEDURE — 97530 THERAPEUTIC ACTIVITIES: CPT | Mod: GP

## 2025-05-07 PROCEDURE — 710N000012 HC RECOVERY PHASE 2, PER MINUTE: Performed by: ORTHOPAEDIC SURGERY

## 2025-05-07 PROCEDURE — 999N000111 HC STATISTIC OT IP EVAL DEFER

## 2025-05-07 PROCEDURE — 86900 BLOOD TYPING SEROLOGIC ABO: CPT | Performed by: STUDENT IN AN ORGANIZED HEALTH CARE EDUCATION/TRAINING PROGRAM

## 2025-05-07 PROCEDURE — 999N000141 HC STATISTIC PRE-PROCEDURE NURSING ASSESSMENT: Performed by: ORTHOPAEDIC SURGERY

## 2025-05-07 PROCEDURE — 250N000009 HC RX 250: Performed by: STUDENT IN AN ORGANIZED HEALTH CARE EDUCATION/TRAINING PROGRAM

## 2025-05-07 PROCEDURE — 36415 COLL VENOUS BLD VENIPUNCTURE: CPT | Performed by: STUDENT IN AN ORGANIZED HEALTH CARE EDUCATION/TRAINING PROGRAM

## 2025-05-07 PROCEDURE — 82962 GLUCOSE BLOOD TEST: CPT

## 2025-05-07 PROCEDURE — 360N000077 HC SURGERY LEVEL 4, PER MIN: Performed by: ORTHOPAEDIC SURGERY

## 2025-05-07 PROCEDURE — 97116 GAIT TRAINING THERAPY: CPT | Mod: GP

## 2025-05-07 PROCEDURE — 258N000003 HC RX IP 258 OP 636: Performed by: STUDENT IN AN ORGANIZED HEALTH CARE EDUCATION/TRAINING PROGRAM

## 2025-05-07 DEVICE — REFLECTION SPHERICAL HEAD SCREW 45MM
Type: IMPLANTABLE DEVICE | Site: HIP | Status: FUNCTIONAL
Brand: REFLECTION

## 2025-05-07 DEVICE — R3 3 HOLE ACETABULAR SHELL 52MM
Type: IMPLANTABLE DEVICE | Site: HIP | Status: FUNCTIONAL
Brand: R3 ACETABULAR

## 2025-05-07 DEVICE — R3 0 DEGREE XLPE ACETABULAR LINER                                    36MM INNER DIAMETER X OUTER DIAMETER 52MM
Type: IMPLANTABLE DEVICE | Site: HIP | Status: FUNCTIONAL
Brand: R3

## 2025-05-07 DEVICE — OXINIUM FEMORAL HEAD 12/14 TAPER                                    36 MM M/+4
Type: IMPLANTABLE DEVICE | Site: HIP | Status: FUNCTIONAL
Brand: OXINIUM

## 2025-05-07 DEVICE — REFLECTION SPHERICAL HEAD SCREW 20MM
Type: IMPLANTABLE DEVICE | Site: HIP | Status: FUNCTIONAL
Brand: REFLECTION

## 2025-05-07 DEVICE — POLARSTEM STANDARD NON-CEMENTED                                    WITH TI/HA 7
Type: IMPLANTABLE DEVICE | Site: HIP | Status: FUNCTIONAL
Brand: POLARSTEM

## 2025-05-07 RX ORDER — BISACODYL 10 MG
10 SUPPOSITORY, RECTAL RECTAL DAILY PRN
Status: CANCELLED | OUTPATIENT
Start: 2025-05-10

## 2025-05-07 RX ORDER — HYDROMORPHONE HYDROCHLORIDE 1 MG/ML
0.2 INJECTION, SOLUTION INTRAMUSCULAR; INTRAVENOUS; SUBCUTANEOUS
Status: DISCONTINUED | OUTPATIENT
Start: 2025-05-07 | End: 2025-05-07 | Stop reason: HOSPADM

## 2025-05-07 RX ORDER — PROPOFOL 10 MG/ML
INJECTION, EMULSION INTRAVENOUS CONTINUOUS PRN
Status: DISCONTINUED | OUTPATIENT
Start: 2025-05-07 | End: 2025-05-07

## 2025-05-07 RX ORDER — HYDROMORPHONE HYDROCHLORIDE 1 MG/ML
0.4 INJECTION, SOLUTION INTRAMUSCULAR; INTRAVENOUS; SUBCUTANEOUS EVERY 5 MIN PRN
Status: DISCONTINUED | OUTPATIENT
Start: 2025-05-07 | End: 2025-05-07 | Stop reason: HOSPADM

## 2025-05-07 RX ORDER — GABAPENTIN 100 MG/1
300 CAPSULE ORAL
Status: DISCONTINUED | OUTPATIENT
Start: 2025-05-07 | End: 2025-05-07 | Stop reason: HOSPADM

## 2025-05-07 RX ORDER — MEPERIDINE HYDROCHLORIDE 25 MG/ML
12.5 INJECTION INTRAMUSCULAR; INTRAVENOUS; SUBCUTANEOUS EVERY 5 MIN PRN
Status: DISCONTINUED | OUTPATIENT
Start: 2025-05-07 | End: 2025-05-07 | Stop reason: HOSPADM

## 2025-05-07 RX ORDER — ACETAMINOPHEN 325 MG/1
975 TABLET ORAL ONCE
Status: COMPLETED | OUTPATIENT
Start: 2025-05-07 | End: 2025-05-07

## 2025-05-07 RX ORDER — BUPIVACAINE HYDROCHLORIDE 7.5 MG/ML
INJECTION, SOLUTION INTRASPINAL
Status: COMPLETED | OUTPATIENT
Start: 2025-05-07 | End: 2025-05-07

## 2025-05-07 RX ORDER — OXYCODONE HYDROCHLORIDE 5 MG/1
5 TABLET ORAL EVERY 4 HOURS PRN
Status: DISCONTINUED | OUTPATIENT
Start: 2025-05-07 | End: 2025-05-07 | Stop reason: HOSPADM

## 2025-05-07 RX ORDER — ONDANSETRON 4 MG/1
4 TABLET, ORALLY DISINTEGRATING ORAL EVERY 30 MIN PRN
Status: DISCONTINUED | OUTPATIENT
Start: 2025-05-07 | End: 2025-05-07 | Stop reason: HOSPADM

## 2025-05-07 RX ORDER — CEFAZOLIN SODIUM/WATER 2 G/20 ML
2 SYRINGE (ML) INTRAVENOUS
Status: COMPLETED | OUTPATIENT
Start: 2025-05-07 | End: 2025-05-07

## 2025-05-07 RX ORDER — HYDROXYZINE HYDROCHLORIDE 25 MG/1
25 TABLET, FILM COATED ORAL EVERY 6 HOURS PRN
Status: DISCONTINUED | OUTPATIENT
Start: 2025-05-07 | End: 2025-05-07 | Stop reason: HOSPADM

## 2025-05-07 RX ORDER — OXYCODONE HYDROCHLORIDE 5 MG/1
5-10 TABLET ORAL EVERY 4 HOURS PRN
Qty: 30 TABLET | Refills: 0 | Status: SHIPPED | OUTPATIENT
Start: 2025-05-07

## 2025-05-07 RX ORDER — HYDROCODONE BITARTRATE AND HOMATROPINE METHYLBROMIDE 5; 1.5 MG/1; MG/1
1 TABLET ORAL EVERY 4 HOURS PRN
Status: ON HOLD | COMMUNITY
End: 2025-05-07

## 2025-05-07 RX ORDER — HYDROMORPHONE HYDROCHLORIDE 1 MG/ML
0.4 INJECTION, SOLUTION INTRAMUSCULAR; INTRAVENOUS; SUBCUTANEOUS
Status: DISCONTINUED | OUTPATIENT
Start: 2025-05-07 | End: 2025-05-07 | Stop reason: HOSPADM

## 2025-05-07 RX ORDER — ALBUTEROL SULFATE 0.83 MG/ML
2.5 SOLUTION RESPIRATORY (INHALATION) EVERY 4 HOURS PRN
Status: DISCONTINUED | OUTPATIENT
Start: 2025-05-07 | End: 2025-05-07 | Stop reason: HOSPADM

## 2025-05-07 RX ORDER — ACETAMINOPHEN 325 MG/1
975 TABLET ORAL ONCE
Status: DISCONTINUED | OUTPATIENT
Start: 2025-05-07 | End: 2025-05-07 | Stop reason: HOSPADM

## 2025-05-07 RX ORDER — ONDANSETRON 2 MG/ML
4 INJECTION INTRAMUSCULAR; INTRAVENOUS EVERY 6 HOURS PRN
Status: DISCONTINUED | OUTPATIENT
Start: 2025-05-07 | End: 2025-05-07 | Stop reason: HOSPADM

## 2025-05-07 RX ORDER — AMOXICILLIN 250 MG
1 CAPSULE ORAL 2 TIMES DAILY PRN
Qty: 30 TABLET | Refills: 0 | Status: SHIPPED | OUTPATIENT
Start: 2025-05-07

## 2025-05-07 RX ORDER — HYDROMORPHONE HYDROCHLORIDE 1 MG/ML
0.2 INJECTION, SOLUTION INTRAMUSCULAR; INTRAVENOUS; SUBCUTANEOUS EVERY 5 MIN PRN
Status: DISCONTINUED | OUTPATIENT
Start: 2025-05-07 | End: 2025-05-07 | Stop reason: HOSPADM

## 2025-05-07 RX ORDER — LIDOCAINE 40 MG/G
CREAM TOPICAL
Status: CANCELLED | OUTPATIENT
Start: 2025-05-07

## 2025-05-07 RX ORDER — CALCIUM CARBONATE 500 MG/1
500 TABLET, CHEWABLE ORAL 4 TIMES DAILY PRN
Status: CANCELLED | OUTPATIENT
Start: 2025-05-07

## 2025-05-07 RX ORDER — PROCHLORPERAZINE MALEATE 10 MG
10 TABLET ORAL EVERY 6 HOURS PRN
Status: CANCELLED | OUTPATIENT
Start: 2025-05-07

## 2025-05-07 RX ORDER — FENTANYL CITRATE 50 UG/ML
INJECTION, SOLUTION INTRAMUSCULAR; INTRAVENOUS PRN
Status: DISCONTINUED | OUTPATIENT
Start: 2025-05-07 | End: 2025-05-07

## 2025-05-07 RX ORDER — FAMOTIDINE 20 MG/1
20 TABLET, FILM COATED ORAL 2 TIMES DAILY
Status: CANCELLED | OUTPATIENT
Start: 2025-05-07

## 2025-05-07 RX ORDER — SODIUM CHLORIDE, SODIUM LACTATE, POTASSIUM CHLORIDE, CALCIUM CHLORIDE 600; 310; 30; 20 MG/100ML; MG/100ML; MG/100ML; MG/100ML
INJECTION, SOLUTION INTRAVENOUS CONTINUOUS
Status: DISCONTINUED | OUTPATIENT
Start: 2025-05-07 | End: 2025-05-07 | Stop reason: HOSPADM

## 2025-05-07 RX ORDER — SODIUM CHLORIDE, SODIUM LACTATE, POTASSIUM CHLORIDE, CALCIUM CHLORIDE 600; 310; 30; 20 MG/100ML; MG/100ML; MG/100ML; MG/100ML
INJECTION, SOLUTION INTRAVENOUS CONTINUOUS PRN
Status: DISCONTINUED | OUTPATIENT
Start: 2025-05-07 | End: 2025-05-07

## 2025-05-07 RX ORDER — TRANEXAMIC ACID 650 MG/1
1950 TABLET ORAL ONCE
Status: COMPLETED | OUTPATIENT
Start: 2025-05-07 | End: 2025-05-07

## 2025-05-07 RX ORDER — SODIUM CHLORIDE, SODIUM LACTATE, POTASSIUM CHLORIDE, CALCIUM CHLORIDE 600; 310; 30; 20 MG/100ML; MG/100ML; MG/100ML; MG/100ML
INJECTION, SOLUTION INTRAVENOUS CONTINUOUS
Status: CANCELLED | OUTPATIENT
Start: 2025-05-07

## 2025-05-07 RX ORDER — POLYETHYLENE GLYCOL 3350 17 G/17G
17 POWDER, FOR SOLUTION ORAL DAILY
Status: CANCELLED | OUTPATIENT
Start: 2025-05-08

## 2025-05-07 RX ORDER — NALOXONE HYDROCHLORIDE 0.4 MG/ML
0.1 INJECTION, SOLUTION INTRAMUSCULAR; INTRAVENOUS; SUBCUTANEOUS
Status: DISCONTINUED | OUTPATIENT
Start: 2025-05-07 | End: 2025-05-07 | Stop reason: HOSPADM

## 2025-05-07 RX ORDER — ACETAMINOPHEN 325 MG/1
975 TABLET ORAL EVERY 8 HOURS
Status: DISCONTINUED | OUTPATIENT
Start: 2025-05-07 | End: 2025-05-07 | Stop reason: HOSPADM

## 2025-05-07 RX ORDER — KETAMINE HYDROCHLORIDE 10 MG/ML
INJECTION INTRAMUSCULAR; INTRAVENOUS PRN
Status: DISCONTINUED | OUTPATIENT
Start: 2025-05-07 | End: 2025-05-07

## 2025-05-07 RX ORDER — ONDANSETRON 4 MG/1
4 TABLET, ORALLY DISINTEGRATING ORAL EVERY 6 HOURS PRN
Qty: 10 TABLET | Refills: 0 | Status: SHIPPED | OUTPATIENT
Start: 2025-05-07

## 2025-05-07 RX ORDER — ONDANSETRON 2 MG/ML
4 INJECTION INTRAMUSCULAR; INTRAVENOUS EVERY 30 MIN PRN
Status: DISCONTINUED | OUTPATIENT
Start: 2025-05-07 | End: 2025-05-07 | Stop reason: HOSPADM

## 2025-05-07 RX ORDER — PROPOFOL 10 MG/ML
INJECTION, EMULSION INTRAVENOUS PRN
Status: DISCONTINUED | OUTPATIENT
Start: 2025-05-07 | End: 2025-05-07

## 2025-05-07 RX ORDER — FENTANYL CITRATE 50 UG/ML
50 INJECTION, SOLUTION INTRAMUSCULAR; INTRAVENOUS EVERY 5 MIN PRN
Status: DISCONTINUED | OUTPATIENT
Start: 2025-05-07 | End: 2025-05-07 | Stop reason: HOSPADM

## 2025-05-07 RX ORDER — CEFAZOLIN SODIUM/WATER 2 G/20 ML
2 SYRINGE (ML) INTRAVENOUS EVERY 8 HOURS
Status: DISCONTINUED | OUTPATIENT
Start: 2025-05-07 | End: 2025-05-07 | Stop reason: HOSPADM

## 2025-05-07 RX ORDER — ASPIRIN 81 MG/1
81 TABLET ORAL 2 TIMES DAILY
Qty: 60 TABLET | Refills: 0 | Status: SHIPPED | OUTPATIENT
Start: 2025-05-07

## 2025-05-07 RX ORDER — ONDANSETRON 2 MG/ML
INJECTION INTRAMUSCULAR; INTRAVENOUS PRN
Status: DISCONTINUED | OUTPATIENT
Start: 2025-05-07 | End: 2025-05-07

## 2025-05-07 RX ORDER — DEXAMETHASONE SODIUM PHOSPHATE 4 MG/ML
INJECTION, SOLUTION INTRA-ARTICULAR; INTRALESIONAL; INTRAMUSCULAR; INTRAVENOUS; SOFT TISSUE PRN
Status: DISCONTINUED | OUTPATIENT
Start: 2025-05-07 | End: 2025-05-07

## 2025-05-07 RX ORDER — CEFAZOLIN SODIUM/WATER 2 G/20 ML
2 SYRINGE (ML) INTRAVENOUS SEE ADMIN INSTRUCTIONS
Status: DISCONTINUED | OUTPATIENT
Start: 2025-05-07 | End: 2025-05-07 | Stop reason: HOSPADM

## 2025-05-07 RX ORDER — LABETALOL HYDROCHLORIDE 5 MG/ML
10 INJECTION, SOLUTION INTRAVENOUS
Status: DISCONTINUED | OUTPATIENT
Start: 2025-05-07 | End: 2025-05-07 | Stop reason: HOSPADM

## 2025-05-07 RX ORDER — ACETAMINOPHEN 325 MG/1
650 TABLET ORAL EVERY 4 HOURS PRN
Qty: 100 TABLET | Refills: 0 | Status: SHIPPED | OUTPATIENT
Start: 2025-05-07

## 2025-05-07 RX ORDER — CEPHALEXIN 500 MG/1
500 CAPSULE ORAL
Qty: 1 CAPSULE | Refills: 0 | Status: SHIPPED | OUTPATIENT
Start: 2025-05-08

## 2025-05-07 RX ORDER — METHOCARBAMOL 750 MG/1
750 TABLET, FILM COATED ORAL EVERY 6 HOURS PRN
Status: DISCONTINUED | OUTPATIENT
Start: 2025-05-07 | End: 2025-05-07 | Stop reason: HOSPADM

## 2025-05-07 RX ORDER — HYDRALAZINE HYDROCHLORIDE 20 MG/ML
2.5-5 INJECTION INTRAMUSCULAR; INTRAVENOUS EVERY 10 MIN PRN
Status: DISCONTINUED | OUTPATIENT
Start: 2025-05-07 | End: 2025-05-07 | Stop reason: HOSPADM

## 2025-05-07 RX ORDER — HALOPERIDOL 5 MG/ML
1 INJECTION INTRAMUSCULAR
Status: DISCONTINUED | OUTPATIENT
Start: 2025-05-07 | End: 2025-05-07 | Stop reason: HOSPADM

## 2025-05-07 RX ORDER — LIDOCAINE 40 MG/G
CREAM TOPICAL
Status: DISCONTINUED | OUTPATIENT
Start: 2025-05-07 | End: 2025-05-07 | Stop reason: HOSPADM

## 2025-05-07 RX ORDER — FENTANYL CITRATE 50 UG/ML
25 INJECTION, SOLUTION INTRAMUSCULAR; INTRAVENOUS EVERY 5 MIN PRN
Status: DISCONTINUED | OUTPATIENT
Start: 2025-05-07 | End: 2025-05-07 | Stop reason: HOSPADM

## 2025-05-07 RX ORDER — AMOXICILLIN 250 MG
1 CAPSULE ORAL 2 TIMES DAILY
Status: CANCELLED | OUTPATIENT
Start: 2025-05-07

## 2025-05-07 RX ORDER — OXYCODONE HYDROCHLORIDE 5 MG/1
10 TABLET ORAL EVERY 4 HOURS PRN
Status: DISCONTINUED | OUTPATIENT
Start: 2025-05-07 | End: 2025-05-07 | Stop reason: HOSPADM

## 2025-05-07 RX ORDER — DIAZEPAM 10 MG/2ML
2.5 INJECTION, SOLUTION INTRAMUSCULAR; INTRAVENOUS
Status: DISCONTINUED | OUTPATIENT
Start: 2025-05-07 | End: 2025-05-07 | Stop reason: HOSPADM

## 2025-05-07 RX ORDER — ASPIRIN 81 MG/1
81 TABLET ORAL 2 TIMES DAILY
Status: CANCELLED | OUTPATIENT
Start: 2025-05-07

## 2025-05-07 RX ORDER — ONDANSETRON 4 MG/1
4 TABLET, ORALLY DISINTEGRATING ORAL EVERY 6 HOURS PRN
Status: DISCONTINUED | OUTPATIENT
Start: 2025-05-07 | End: 2025-05-07 | Stop reason: HOSPADM

## 2025-05-07 RX ADMIN — FENTANYL CITRATE 25 MCG: 50 INJECTION INTRAMUSCULAR; INTRAVENOUS at 09:13

## 2025-05-07 RX ADMIN — ACETAMINOPHEN 975 MG: 325 TABLET ORAL at 14:07

## 2025-05-07 RX ADMIN — OXYCODONE 5 MG: 5 TABLET ORAL at 12:54

## 2025-05-07 RX ADMIN — PHENYLEPHRINE HYDROCHLORIDE 100 MCG: 10 INJECTION INTRAVENOUS at 08:16

## 2025-05-07 RX ADMIN — PHENYLEPHRINE HYDROCHLORIDE 0.4 MCG/KG/MIN: 10 INJECTION INTRAVENOUS at 08:14

## 2025-05-07 RX ADMIN — ONDANSETRON 4 MG: 2 INJECTION INTRAMUSCULAR; INTRAVENOUS at 07:24

## 2025-05-07 RX ADMIN — SODIUM CHLORIDE, SODIUM LACTATE, POTASSIUM CHLORIDE, AND CALCIUM CHLORIDE: .6; .31; .03; .02 INJECTION, SOLUTION INTRAVENOUS at 07:17

## 2025-05-07 RX ADMIN — FENTANYL CITRATE 50 MCG: 50 INJECTION INTRAMUSCULAR; INTRAVENOUS at 07:32

## 2025-05-07 RX ADMIN — MIDAZOLAM 2 MG: 1 INJECTION INTRAMUSCULAR; INTRAVENOUS at 07:17

## 2025-05-07 RX ADMIN — PHENYLEPHRINE HYDROCHLORIDE 0.2 MCG/KG/MIN: 10 INJECTION INTRAVENOUS at 07:48

## 2025-05-07 RX ADMIN — MIDAZOLAM 2 MG: 1 INJECTION INTRAMUSCULAR; INTRAVENOUS at 07:36

## 2025-05-07 RX ADMIN — FENTANYL CITRATE 25 MCG: 50 INJECTION INTRAMUSCULAR; INTRAVENOUS at 09:08

## 2025-05-07 RX ADMIN — DEXMEDETOMIDINE HYDROCHLORIDE 12 MCG: 100 INJECTION, SOLUTION INTRAVENOUS at 07:41

## 2025-05-07 RX ADMIN — PROPOFOL 60 MG: 10 INJECTION, EMULSION INTRAVENOUS at 07:54

## 2025-05-07 RX ADMIN — TRANEXAMIC ACID 1950 MG: 650 TABLET ORAL at 06:32

## 2025-05-07 RX ADMIN — ACETAMINOPHEN 975 MG: 325 TABLET ORAL at 06:32

## 2025-05-07 RX ADMIN — PROPOFOL 30 MG: 10 INJECTION, EMULSION INTRAVENOUS at 09:13

## 2025-05-07 RX ADMIN — PROPOFOL 120 MCG/KG/MIN: 10 INJECTION, EMULSION INTRAVENOUS at 07:54

## 2025-05-07 RX ADMIN — Medication 2 G: at 13:19

## 2025-05-07 RX ADMIN — SODIUM CHLORIDE, SODIUM LACTATE, POTASSIUM CHLORIDE, AND CALCIUM CHLORIDE: .6; .31; .03; .02 INJECTION, SOLUTION INTRAVENOUS at 09:36

## 2025-05-07 RX ADMIN — DEXAMETHASONE SODIUM PHOSPHATE 10 MG: 4 INJECTION, SOLUTION INTRAMUSCULAR; INTRAVENOUS at 08:51

## 2025-05-07 RX ADMIN — PROPOFOL 20 MG: 10 INJECTION, EMULSION INTRAVENOUS at 09:43

## 2025-05-07 RX ADMIN — BUPIVACAINE HYDROCHLORIDE IN DEXTROSE 1.6 ML: 7.5 INJECTION, SOLUTION SUBARACHNOID at 07:48

## 2025-05-07 RX ADMIN — DEXMEDETOMIDINE HYDROCHLORIDE 8 MCG: 100 INJECTION, SOLUTION INTRAVENOUS at 07:37

## 2025-05-07 RX ADMIN — Medication 2 G: at 07:32

## 2025-05-07 ASSESSMENT — ACTIVITIES OF DAILY LIVING (ADL)
ADLS_ACUITY_SCORE: 33
ADLS_ACUITY_SCORE: 32
ADLS_ACUITY_SCORE: 33

## 2025-05-07 ASSESSMENT — LIFESTYLE VARIABLES: TOBACCO_USE: 1

## 2025-05-07 ASSESSMENT — ENCOUNTER SYMPTOMS: SEIZURES: 0

## 2025-05-07 NOTE — PROGRESS NOTES
I have personally examined this patient and have reviewed the clinical presentation and progress note with the resident. I agree with the treatment plan as outlined. The plan was formulated with the resident on the day of the resident's dictation.  We discussed that given the level of symptoms and radiographic changes that further non-operative management in the form injection and/or physical therapist directed exercises is not mandatory. We spent twenty minutes discussing total hip arthroplasty.  We discussed the implants, the procedure, the risks and benefits, and the post-operative course.  We discussed blood clots, blood clots to the lungs, injury to blood vessels and nerves, dislocation, infection, and leg length difference.  We discussed that as part of the routine part of surgical care a qualified assist may finish closing the wound after I have left the room. All the patients questions were answered to the best of my ability.

## 2025-05-07 NOTE — ANESTHESIA POSTPROCEDURE EVALUATION
Patient: Conor Contreras    Procedure: Procedure(s):  ARTHROPLASTY, HIP, TOTAL       Anesthesia Type:  Spinal    Note:  Disposition: Outpatient   Postop Pain Control: Uneventful            Sign Out: Well controlled pain   PONV: No   Neuro/Psych: Uneventful            Sign Out: Acceptable/Baseline neuro status   Airway/Respiratory: Uneventful            Sign Out: Acceptable/Baseline resp. status   CV/Hemodynamics: Uneventful            Sign Out: Acceptable CV status; No obvious hypovolemia; No obvious fluid overload   Other NRE: NONE   DID A NON-ROUTINE EVENT OCCUR? No           Last vitals:  Vitals Value Taken Time   /63 05/07/25 11:20   Temp 36.6  C (97.9  F) 05/07/25 11:15   Pulse 80 05/07/25 11:36   Resp 14 05/07/25 11:36   SpO2 93 % 05/07/25 11:36   Vitals shown include unfiled device data.    Electronically Signed By: Eve Fleming MD  May 7, 2025  11:37 AM

## 2025-05-07 NOTE — ANESTHESIA PREPROCEDURE EVALUATION
Anesthesia Pre-Procedure Evaluation    Patient: Conor Contreras   MRN: [id number] : 1970        Procedure : Procedure(s):  ARTHROPLASTY, HIP, TOTAL          Past Medical History:   Diagnosis Date    ADD (attention deficit disorder)     Depressive disorder     Dysthymia     Elevated lipids     Generalised anxiety disorder 2012    Lung nodules     HARLAN (obstructive sleep apnea) 2011    cpap at night      Past Surgical History:   Procedure Laterality Date    ENDOBRONCHIAL ULTRASOUND FLEXIBLE N/A 2015    Procedure: ENDOBRONCHIAL ULTRASOUND FLEXIBLE;  Surgeon: Ramakrishna Wililamson MD;  Location:  GI    VASECTOMY        No Known Allergies   Social History     Tobacco Use    Smoking status: Former     Current packs/day: 0.00     Average packs/day: 0.5 packs/day for 10.0 years (5.0 ttl pk-yrs)     Types: Cigarettes     Start date: 3/18/1991     Quit date: 3/18/2001     Years since quittin.1     Passive exposure: Never    Smokeless tobacco: Never    Tobacco comments:     QUIT 9 YEARS AGO   Substance Use Topics    Alcohol use: Yes     Comment: 6 to 8 beers per week      Wt Readings from Last 1 Encounters:   25 89.2 kg (196 lb 9.6 oz)        Anesthesia Evaluation   Pt has had prior anesthetic. Type: MAC.    No history of anesthetic complications       ROS/MED HX  ENT/Pulmonary: Comment:   # Former smoker, ~1/2 PD. Quit   # Allergic rhinitis  # Moderate persistent asthma without complication - Prescribed tiotropium-olodaterol inhaler daily, and Dupilumab (DUPIXENT) 300 mg subcutaneously every other week.  -- reports not using them on regular basis   # Interstitial lung disease/ Sarcoidosis  -- Hx/o pulmonary nodules and mediastinal lymphadenopathies    (+) sleep apnea, moderate, uses CPAP,              tobacco use, Past use,                       Neurologic:  - neg neurologic ROS  (-) no seizures, no CVA and no TIA   Cardiovascular:  - neg cardiovascular ROS  (-) hypertension  "  METS/Exercise Tolerance: >4 METS    Hematologic:  - neg hematologic  ROS  (-) anemia   Musculoskeletal: Comment: Low back pain      GI/Hepatic:  - neg GI/hepatic ROS     Renal/Genitourinary: Comment: Labs 4/2025: Creat 0.82, eGFR >90    (+)        BPH,      Endo: Comment: Overweight      Psychiatric/Substance Use: Comment: ADD  Depression/Dysthymia  NICOLA    (+) psychiatric history anxiety       Infectious Disease:  - neg infectious disease ROS     Malignancy:  - neg malignancy ROS     Other:  - neg other ROS          Physical Exam    Airway        Mallampati: III   TM distance: > 3 FB   Neck ROM: full   Mouth opening: < 3 cm    Respiratory Devices and Support         Dental       (+) Minor Abnormalities - some fillings, tiny chips      Cardiovascular   cardiovascular exam normal          Pulmonary   pulmonary exam normal        (+) decreased breath sounds           OUTSIDE LABS:  CBC:   Lab Results   Component Value Date    WBC 6.5 04/21/2025    WBC 11.0 12/05/2024    HGB 13.4 04/21/2025    HGB 13.7 12/05/2024    HCT 40.4 04/21/2025    HCT 40.4 12/05/2024     04/21/2025     12/05/2024     BMP:   Lab Results   Component Value Date     04/21/2025     12/05/2024    POTASSIUM 4.0 04/21/2025    POTASSIUM 4.2 12/05/2024    CHLORIDE 99 04/21/2025    CHLORIDE 100 12/05/2024    CO2 26 04/21/2025    CO2 30 (H) 12/05/2024    BUN 23.0 (H) 04/21/2025    BUN 24.0 (H) 12/05/2024    CR 0.82 04/21/2025    CR 0.81 12/05/2024    GLC 97 04/21/2025     (H) 12/05/2024     COAGS:   Lab Results   Component Value Date    INR 1.0 01/12/2015     POC: No results found for: \"BGM\", \"HCG\", \"HCGS\"  HEPATIC:   Lab Results   Component Value Date    ALBUMIN 4.5 04/21/2025    PROTTOTAL 8.2 04/21/2025    ALT 55 04/21/2025    AST 35 04/21/2025    ALKPHOS 153 (H) 04/21/2025    BILITOTAL 0.2 04/21/2025     OTHER:   Lab Results   Component Value Date    KATIUSKA 9.9 04/21/2025    TSH 1.46 01/07/2015    CRP 3.6 04/27/2018 " "      Anesthesia Plan    ASA Status:  3    NPO Status:  NPO Appropriate                  Consents    Anesthesia Plan(s) and associated risks, benefits, and realistic alternatives discussed. Questions answered and patient/representative(s) expressed understanding.     - Discussed:     - Discussed with:  Patient, Spouse       Blood Consent:      - Consented: consented to blood products     Postoperative Care       PONV prophylaxis: Ondansetron (or other 5HT-3), Dexamethasone or Solumedrol     Comments:               Eve Fleming MD    Clinically Significant Risk Factors Present on Admission                             # Overweight: Estimated body mass index is 28.21 kg/m  as calculated from the following:    Height as of 4/21/25: 1.778 m (5' 10\").    Weight as of 4/21/25: 89.2 kg (196 lb 9.6 oz).                "

## 2025-05-07 NOTE — OR NURSING
PACU to Inpatient Nursing Handoff    Patient Conor Contreras is a 55 year old male who speaks English.   Procedure Procedure(s):  ARTHROPLASTY, HIP, TOTAL   Surgeon(s) Primary: Sai Lennon MD  Assisting: Natalia Hoff PA-C  Resident - Assisting: Abby Price MD     No Known Allergies    Isolation  [unfilled]     Past Medical History   has a past medical history of ADD (attention deficit disorder) (2003), Depressive disorder, Dysthymia (2002), Elevated lipids, Generalised anxiety disorder (04/25/2012), Lung nodules, and HARLAN (obstructive sleep apnea) (06/22/2011).    Anesthesia Spinal   Dermatome Level     Preop Meds acetaminophen (Tylenol) - time given: 0632  TXA 1,950 mg - time given: 0632   Nerve block Spinal .  Location:bilateral. Med:bupivacaine. Time given: 0828   Intraop Meds dexamethasone (Decadron)  dexmedetomidine (Precedex): 20 mcg total  fentanyl (Sublimaze): 100 mcg total  ketamine (Ketalar): 60 mg given  ondansetron (Zofran): last given at 0724   Local Meds Yes - Local Cocktail (morphine, ropivacaine, epinephrine, Toradol)   Antibiotics cefazolin (Ancef) - last given at 0732     Pain Patient Currently in Pain: denies   PACU meds  Not applicable   PCA / epidural No   Capnography     Telemetry ECG Rhythm: Sinus rhythm   Inpatient Telemetry Monitor Ordered? No        Labs Glucose Lab Results   Component Value Date    GLC 92 05/07/2025    GLC 89 10/12/2022    GLC 87 10/28/2020       Hgb Lab Results   Component Value Date    HGB 13.4 04/21/2025    HGB 14.7 10/28/2020       INR Lab Results   Component Value Date    INR 1.0 01/12/2015      PACU Imaging Completed     Wound/Incision Incision/Surgical Site 05/07/25 Left Hip (Active)   Incision Assessment UTV 05/07/25 1007   Dressing Per Plan of Care;Non adherent 05/07/25 0932   Closure Approximated;Sutures;Liquid bandage;Adhesive strip(s) 05/07/25 0932   Incision Drainage Amount None 05/07/25 0932   Dressing Intervention Clean, dry, intact 05/07/25  1007   Number of days: 0      CMS        Equipment ice pack and abductor pillow   Other LDA       IV Access Peripheral IV 05/07/25 Left;Posterior Hand (Active)   Site Assessment WDL 05/07/25 0951   Line Status Infusing 05/07/25 0951   Dressing Transparent 05/07/25 0705   Dressing Status clean;dry;intact 05/07/25 0951   Dressing Intervention New dressing  05/07/25 0705   Line Intervention Flushed 05/07/25 0705   Phlebitis Scale 0-->no symptoms 05/07/25 0951   Infiltration? no 05/07/25 0705   Number of days: 0      Blood Products Not applicable  mL   Intake/Output Date 05/07/25 0700 - 05/08/25 0659   Shift 9458-5597 3034-2967 9744-4705 24 Hour Total   INTAKE   P.O. 50   50   I.V. 1100   1100   Shift Total(mL/kg) 1150(13.04)   1150(13.04)   OUTPUT   Blood 250   250   Shift Total(mL/kg) 250(2.83)   250(2.83)   Weight (kg) 88.2 88.2 88.2 88.2      Drains / Power     Time of void PreOp Time of Void Prior to Procedure: 0705 (05/07/25 0704)    PostOp      Diapered? No   Bladder Scan     PO 50 mL (05/07/25 1006)  tolerating sips     Vitals    B/P: (!) 85/61  T: 97.5  F (36.4  C)    Temp src: Oral  P:  Pulse: 113 (05/07/25 1000)          R: 14  O2:  SpO2: 97 %    O2 Device: Simple face mask (05/07/25 0951)    Oxygen Delivery: 6 LPM (05/07/25 0951)         Family/support present significant other Lesly   Patient belongings     Patient transported on bed   DC meds/scripts (obs/outpt) Not applicable   Inpatient Pain Meds Released? Yes       Special needs/considerations Very pleasant   Tasks needing completion IM consult, has CPAP machine in car       James Patterson, RN  [place]

## 2025-05-07 NOTE — OP NOTE
OPERATIVE REPORT    DATE OF SERVICE: 5/7/25    SURGEON: Sai Lennon MD.    ASSISTANT(S): Natalia Hoff PA-C The assistance of the Physician Assistant was necessary for positioning, draping, retraction, leg positioning for placement of implants, and layered closure. There was no qualified available resident or fellow who was aware of the intricies of the procedure and requirements of all portions of the procedure.  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 Item Serial No.  Lot No. LRB No. Used Action   IMP SCR ACET SNN SPHERICAL HEAD 6.5X20MM [id number] - [id number] Metallic Hardware/[place] IMP SCR ACET SNN SPHERICAL HEAD 6.5X20MM [id number]  [employer] [id number] Left 1 Implanted   IMP SHELL SNR ACET R3 3H 52MM [id number] - [id number] Total Joint Component/Insert IMP SHELL SNR ACET R3 3H 52MM [id number]  [employer] [id number] Left 1 Implanted   IMP SCR ACET SNN SPHERICAL HEAD 6.5X45MM [id number] - [id number] Metallic Hardware/[place] IMP SCR ACET SNN SPHERICAL HEAD 6.5X45MM [id number]  [employer] [id number] Left 1 Implanted   IMP LINER SNR ACET R3 XLPE 0DEG [id number] - [id number] Total Joint Component/Insert IMP LINER SNR ACET R3 XLPE 0DEG [id number]  [employer] [id number] Left 1 Implanted   IMP STEM S&N POLARSTEM STD TI/HA SZ7 NON BABATUNDE [id number] - [id number] Total Joint Component/Insert IMP STEM S&N POLARSTEM STD TI/HA SZ7 NON BABATUNDE [id number]  [employer] [id number] Left 1 Implanted   IMP HEAD FEMORAL SNN 12/14 OXIN 36MM +4MM [id number] - [id number] Total Joint Component/Insert IMP HEAD FEMORAL SNN 12/14 OXIN 36MM +4MM [id number]  [employer] [id number] Left 1 Implanted       ANESTHETIC: spinal     OPERATIVE FINDINGS:  End stage arthrosis  of the hip    BLOOD LOSS about 250 ml     COMPLICATIONS:  None apparent    OPERATIVE INDICATIONS:  The patient has osteonecrosis of the femoral head that has gone on to collapse. We discussed the risks and benefits of total hip and he chose to proceed.     DESCRIPTION OF THE PROCEDURE:  The patient was identified in the preoperative holding area.  The consent form including the risks and benefits were reviewed with the patient.  The operative limb was identified and marked.  The patient was brought back to the operating room and placed supine on the operating table.  A spinal anesthetic was placed by the anesthesia team.   The patient was placed in the lateral decubitus position and prepped and draped in the normal standard fashion for a hip replacement.  A time-out was called.  Antibiotics were given.  We utilized an approximately 15 cm curvilinear incision, centered on the vastus ridge, and performed a standard posterior approach to the hip.  The tensor fascia was split.  A small portion of gluteus ophelia was split in line with its fibers.  The sciatic nerve was palpated.  The east-west retractor was placed.  The posterior border of gluteus medius was exposed and retracted.  The tendon of piriformis and that of the obturators was released from their attachments.  A trapdoor posterior capsulotomy was performed.  The hip was dislocated.  The lesser trochanter was exposed.  A ruler was used to measure and electrocautery was used to greg our neck cut as preoperatively templated.  The head was measured with a caliper and found to be 48 mm.  This measurement was used to choose our first reamer.  The neck cut was re-measured. The femur was elevated.  A Hohmann was placed over the anterior rim of the acetabulum and the femur was subluxed anterior.  A split was made in the inferior capsule.  The transverse acetabular ligament was left intact and used a guide for the anterversion of the acetabular component.   "Circumferential retractors were placed.  We began reaming and went up by two until sufficient contact was made with the acetabular rim.  We then went up by one millimeter for a one millimeter press-fit.  We were within one size of our preoperative plan.   A trail was placed.  It had an excellent press fit.  We then placed out final component in 40 degrees of inclination and approximately 20 degrees of anteversion, parallel to the transverse ligament.  The press fit was excellent.  Screws were placed for additional initial fixation.  A flat liner was then placed. It locked into place.  Attention was turned to the femur.  Retractors were placed to elevated the proximal femur and to protect the tendon of gluteus medius.  Remaining lateral neck was removed and the piriformis fossa was cleared of soft-tissue.  A box osteotome and canal finder were used to prepare for broaching.  A sharp broach was used to lateralize slightly.  We then broached up sequentially to a size 7.  It was rotationally stable and sat up 1-2 millimeters from the neck-cut.  Preoperatively the patient had templated to a standard offset stem.  The standard offset stem appropriately tensioned the abductors.  We trialed the following femoral heads: 0 and +4.  The +4 appropriately tensioned the abductors and clinically equalized the leg-lengths.  The stability exam was excellent.  The hip was stable and there was no impingement posteriorly with hyper-extension and maximal external rotation.  With full extension, the knee could be fixed to bring the foot nearly to the buttock.  With the hip in ninety degrees of flexion and neutral rotation there was greater than 60 degrees of internal rotation before subluxation.  There appropriate movement with a \"alexandre\" test.  Happy with our stability exam, the final implant was placed in approximately twenty degrees of anteversion.  It sat at the level of the broach.  We then trailed with a +4 head.  The stability " exam was identical.  We then placed the final head on a clean, dry neck and impacted it into place. The hip was reduced after directly visualizing the entire acetabulum.  The wound was then irrigated.  The posterior capsule was repaired to the anterior capsule and and short external rotators were sutured to their anatomic attachment on the greater trochanter with non-absorbable suture through bone tunnels.The fascia was closed with interrupted Vicryl, the dermis with interrupted Vicryl, and skin with running monocryl, Dermabond and steri-strips.  At the end of the procedure the sponge and needle counts were correct times two.  The patient tolerated the procedure well and returned to the PAR extubated and stable.    POSTOPERATIVE PLAN:  1. Weight bearing as tolerated  2. Standard posterior hip precautions  3. DVT prophylaxis   4. 24 hours of prophylactic antibiotics  5. Follow-up:  Wound clinic in 2 weeks and with Saji in clinic in 6 weeks for x-rays and a rehabilitation check.

## 2025-05-07 NOTE — OR NURSING
PA called regarding pt and wife thinking they were to be d'jeanette home today - orders indicated pt to stay- PA currently in speaking with pt regarding discharge

## 2025-05-07 NOTE — ANESTHESIA PROCEDURE NOTES
"Intrathecal injection Procedure Note    Pre-Procedure   Staff -        Anesthesiologist:  Eve Bone MD       CRNA: Milvia Galloway APRN CRNA       Other Anesthesia Staff: Mel Quinteros       Performed By: with CRNAs, SRNA and anesthesiologist       Procedure performed by resident/fellow/CRNA in presence of a teaching physician.         Location: OR       Procedure Start/Stop Times: 5/7/2025 7:48 AM       Pre-Anesthestic Checklist: patient identified, IV checked, risks and benefits discussed, informed consent, monitors and equipment checked, pre-op evaluation, at physician/surgeon's request and post-op pain management  Timeout:       Correct Patient: Yes        Correct Procedure: Yes        Correct Site: Yes        Correct Position: Yes   Procedure Documentation  Procedure: intrathecal injection         Diagnosis: hip arthroplasty       Patient Position: sitting       Skin prep: Chloraprep       Insertion Site: L3-4. (midline approach).       Needle Gauge: 25.        Needle Length (Inches): 3.5        Spinal Needle Type: Pencan       Introducer used       Introducer: 20 G       # of attempts: 2 and  # of redirects:  3    Assessment/Narrative         Paresthesias: No.       CSF fluid: clear.       Opening pressure was cmH2O while  Sitting.      Medication(s) Administered   0.75% Hyperbaric Bupivacaine (Intrathecal) - Intrathecal   1.6 mL - 5/7/2025 7:48:00 AM    FOR [facility] ([facility]) ONLY:   Pain Team Contact information: please page the Pain Team Via [employer]. Search \"Pain\". During daytime hours, please page the attending first. At night please page the resident first.      "

## 2025-05-07 NOTE — BRIEF OP NOTE
Orthopaedic Surgery Brief Op-Note      Patient: Conor Contreras  : 1970  Date of Service: 2025 9:51 AM    Pre-operative Diagnosis: Primary osteoarthritis of left hip [M16.12]  Post-operative Diagnosis: same    Procedure(s) Performed: Procedure(s):  ARTHROPLASTY, HIP, TOTAL    Staff: Dr. Lennon  Assistants:   MD Natalia Wan PA-C    Anesthesia: General  EBL: 250 cc    Implants:   Implant Name Type Inv. Item Serial No.  Lot No. LRB No. Used Action   IMP SCR ACET SNN SPHERICAL HEAD 6.5X20MM [id number] - [id number] Metallic Hardware/[place] IMP SCR ACET SNN SPHERICAL HEAD 6.5X20MM [id number]  [employer] [id number] Left 1 Implanted   IMP SHELL SNR ACET R3 3H 52MM [id number] - [id number] Total Joint Component/Insert IMP SHELL SNR ACET R3 3H 52MM [id number]  [employer] [id number] Left 1 Implanted   IMP SCR ACET SNN SPHERICAL HEAD 6.5X45MM [id number] - [id number] Metallic Hardware/[place] IMP SCR ACET SNN SPHERICAL HEAD 6.5X45MM [id number]  [employer] [id number] Left 1 Implanted   IMP LINER SNR ACET R3 XLPE 0DEG [id number] - [id number] Total Joint Component/Insert IMP LINER SNR ACET R3 XLPE 0DEG [id number]  [employer] [id number] Left 1 Implanted   IMP STEM S&N POLARSTEM STD TI/HA SZ7 NON BABATUNDE [id number] - [id number] Total Joint Component/Insert IMP STEM S&N POLARSTEM STD TI/HA SZ7 NON BABATUNDE [id number]  [employer] [id number] Left 1 Implanted   IMP HEAD FEMORAL SNN 12/14 OXIN 36MM +4MM [id number] - [id number] Total Joint Component/Insert IMP HEAD FEMORAL SNN 12/14 OXIN 36MM +4MM [id number]  [employer] [id number] Left 1 Implanted     Drains: none  Intra-op Labs/Cxs/Specimens: None  Complications: No apparent complications during procedure  Findings: Please see dictated operative note for details    Disposition: Stable to PACU, then admit to Orthopaedics    POST OPERATIVE PLAN    Assessment/Plan: Conortim Contreras is a 55 year old male s/p  Procedure(s):  ARTHROPLASTY, HIP, TOTAL on 5/7/2025 with Dr. Lennon.    Activity: Up with assist and assistive devices as needed until independent. Posterior hip precautions to operative side x 3 months:  1) No hip flexion beyond 90 degrees  2) No adduction beyond midline  3) No internal rotation beyond neutral   Weight bearing status: WBAT  Antibiotics: Cefazolin x 24 hours  Diet: Begin with clear fluids and progress diet as tolerated. Bowel regimen. Anti-emetics PRN.    DVT prophylaxis:  mg x 4 weeks beginning POD 1   Elevation: Elevate heels off of bed on pillows   Wound Care: Tegaderm and alginate x 2 weeks   Drains: none  Pain management: Orals PRN, IV for breakthrough only  X-rays: AP Pelvis and Lateral operative hip in PACU.   Physical Therapy: Mobilization, ROM, ADL's, Posterior hip precautions.    Occupational Therapy: ADL's  Labs: Trend Hgb on POD #1, 2  Consults: PT, OT. Hospitalist, appreciate assistance in caring for this patient throughout the perioperative period    Future Appointments   Date Time Provider Department Center   5/7/2025  3:00 PM Davina Ann, PT [facility] [place]   5/8/2025  8:45 AM Claudia Quezada, PT [facility] [place]   5/8/2025  9:15 AM Jennifer Parry OTR [facility] [place]   5/14/2025 10:20 AM Km Babin, PT [facility]    5/19/2025  4:00 PM Nay Galloway PA-C [facility] [place]   5/21/2025 10:20 AM Km Babin, PT [facility]   5/28/2025 10:20 AM Km Babin, PT [facility]    5/28/2025 11:00 AM PFT LAB [facility]   6/17/2025  9:00 AM Sai Lennon MD [facility]   7/2/2025  8:00 AM Sherlyn Rice MD [facility]   3/2/2026 10:30 AM Shilpa Renae MD [facility] [place]       Disposition: Pending progress with therapies, pain control on orals, and medical stability, anticipate discharge to Home POD1    Abby Price MD  Orthopedic Surgery PGY-4

## 2025-05-07 NOTE — PLAN OF CARE
Occupational Therapy: Orders received. Chart reviewed and discussed with care team.? Occupational Therapy not indicated due to POD 0 fast track patients no longer receiving OT prior to discharge per new protocol. Plan for pt to f/u with OP PT.? Defer discharge recommendations to ortho.? Will complete orders.

## 2025-05-07 NOTE — DISCHARGE INSTRUCTIONS
To contact a doctor, call Dr Lennon  or:     [phone] and ask for the Resident On Call for:          Orthopedics (answered 24 hours a day)   Emergency Departments:  [facility] Emergency Department: [phone]     [facility] Emergency Department: [phone]

## 2025-05-07 NOTE — PROGRESS NOTES
[facility]                                                                                   OUTPATIENT PHYSICAL THERAPY    PLAN OF TREATMENT FOR OUTPATIENT REHABILITATION   Patient's Last Name, First Name, Conor Hahn YOB: 1970   Provider's Name   [facility]   Medical Record No.  [id number]     Onset Date: 05/07/25 Start of Care Date: 05/07/25     Medical Diagnosis:  L OSMAN               PT Diagnosis:  impaired mobility s/p OSMAN Certification Dates:  From: 05/07/25  To: 06/06/25       See note for plan of treatment, functional goals, and certification details.    I CERTIFY THE NEED FOR THESE SERVICES FURNISHED UNDER        THIS PLAN OF TREATMENT AND WHILE UNDER MY CARE (Physician co-signature of this document indicates review and certification of the therapy plan).               05/07/25 5972   Appointment Info   Signing Clinician's Name / Credentials (PT) Yumiko Gonzalez DPT   Rehab Comments (PT) L posterior hip   Quick Adds   Quick Adds Certification;[facility] Auth & Certification   Living Environment   People in Home spouse   Current Living Arrangements house   Home Accessibility stairs to enter home;stairs within home   Number of Stairs, Main Entrance 3   Stair Railings, Main Entrance railings safe and in good condition   Number of Stairs, Within Home, Primary greater than 10 stairs  (~10 to basement; does not need to negotiate)   Stair Railings, Within Home, Primary railings safe and in good condition   Transportation Anticipated family or friend will provide   Living Environment Comments Walk in shower; all needs can be met on main level   Self-Care   Usual Activity Tolerance moderate   Current Activity Tolerance moderate   Equipment Currently Used at Home cane, straight;shower chair;walker, rolling;grab bar, tub/shower   Fall history within last six months no   Activity/Exercise/Self-Care Comment Pt IND at baseline with ADLs.  "reports use of SPC prior to surgery, has FWW available at home for use post op. Wife is very supportive and will be \"\" post OSMAN   General Information   Onset of Illness/Injury or Date of Surgery 05/07/25   Referring Physician Natalia Hoff PA-C   Pertinent History of Current Problem (include personal factors and/or comorbidities that impact the POC) per chart; Conor Conrteras is a 55 year old male s/p Procedure(s):  ARTHROPLASTY, HIP, TOTAL on 5/7/2025 with Dr. Lennon.   Existing Precautions/Restrictions fall;90 degree hip flexion;no hip ADD past midline;no hip IR   Weight-Bearing Status - LLE weight-bearing as tolerated   General Observations Greeted in PACU chair, with wife and RN present   Cognition   Affect/Mental Status (Cognition) WFL   Orientation Status (Cognition) oriented x 4   Follows Commands (Cognition) WFL   Cognitive Status Comments Repetition needed with residual anaesthesia on board   Pain Assessment   Patient Currently in Pain No   Integumentary/Edema   Integumentary/Edema other (describe)   Integumentary/Edema Comments incision covered with post op dressing and pt wearing clothes for d/c   Posture    Posture Protracted shoulders   Range of Motion (ROM)   Range of Motion ROM deficits secondary to surgical procedure   Strength (Manual Muscle Testing)   Strength (Manual Muscle Testing) strength is WFL   Bed Mobility   Comment, (Bed Mobility) NT - d/t PACU; anticipate will need assist with sit<>supine with CGA for LLE management   Transfers   Comment, (Transfers) STS with CGA from chair with FWW; mild R LOB initially with postural transition   Gait/Stairs (Locomotion)   Comment, (Gait/Stairs) ambulating with FWW, CGA initially   Balance   Balance Comments unsteady with intial transfer; CGA for balance correction   Sensory Examination   Sensory Perception Comments reports \"little\" remaining numbness in foot on L side. denies baseline neuropathy   Clinical Impression   Criteria for Skilled " Therapeutic Intervention Yes, treatment indicated   PT Diagnosis (PT) impaired mobility s/p OSMAN   Influenced by the following impairments posterior hip prec, WBAT, generalized weakness, decreased activity tolerance, impaired balance   Functional limitations due to impairments bed mobility, transfers, ambulation, stairs, higher level activities   Clinical Presentation (PT Evaluation Complexity) stable   Clinical Presentation Rationale clinical reasoning   Clinical Decision Making (Complexity) low complexity   Planned Therapy Interventions (PT) balance training;bed mobility training;gait training;home exercise program;manual therapy techniques;neuromuscular re-education;patient/family education;ROM (range of motion);stair training;strengthening;transfer training;progressive activity/exercise;risk factor education;home program guidelines   Risk & Benefits of therapy have been explained evaluation/treatment results reviewed;care plan/treatment goals reviewed;risks/benefits reviewed;current/potential barriers reviewed;participants voiced agreement with care plan;participants included;patient   PT Total Evaluation Time   PT Eval, Low Complexity Minutes (97991) 6   Therapy Certification   Start of care date 05/07/25   Certification date from 05/07/25   Certification date to 06/06/25   Medical Diagnosis L OSMAN   [facility] Authorization Information   Condition type Initial onset (within last 3 months)   Cause of current episode Post Surgical   Type of surgery 5-Joint Replacement   Nature of treatment Rehabilitative   Functional ability Minimal functional limitations   Documented POC (choose all that apply) Measurable short and long term/discharge treatment goals related to physical and functional deficits.;Frequency of treatment visits and treatment activities to address deficit areas.;Patient agrees to program participation including home program   Briefly describe symptoms Increased pain which impaired tolerance for mobility    How did the symptoms start Overtime   Last 24H: avg pain/symptom intensity  4/10   Past wk: avg pain/symptom intensity 5/10   Frequency of Symptoms Frequently (51-75% of the time)   Symptom impact on ADLs Moderately   Condition change since eval N/A (first visit)   General health reported by patient Good   Physical Therapy Goals   PT Frequency One time eval and treatment only   PT Predicted Duration/Target Date for Goal Attainment 05/07/25   PT Goals Bed Mobility;Transfers;Gait;Stairs;PT Goal 1   PT: Bed Mobility Supervision/stand-by assist;Supine to/from sit;Within precautions   PT: Transfers Supervision/stand-by assist;Bed to/from chair;Within precautions   PT: Gait Supervision/stand-by assist;150 feet;Rolling walker   PT: Stairs Supervision/stand-by assist;3 stairs;Rail on right   PT: Goal 1 Pt will demo IND with recall of posterior hip prec in order to demo safety with functional mobility   Interventions   Interventions Quick Adds Therapeutic Activity;Gait Training;Therapeutic Procedure   Therapeutic Procedure/Exercise   Ther. Procedure: strength, endurance, ROM, flexibillity Minutes (87543) 8   Symptoms Noted During/After Treatment fatigue;increased pain   Treatment Detail/Skilled Intervention Education and demonstration provided on OSMAN exercises from PACU chair. Education on avoiding heel slide while in seated d/t <90deg hip flexion restriction. Pt demo good quad activation on L, difficulty with SLR with minimal clearance noted. Education on frequency and grading activity in relation to pain and sxs management upon home d/c   Therapeutic Activity   Therapeutic Activities: dynamic activities to improve functional performance Minutes (58258) 8   Symptoms Noted During/After Treatment Fatigue   Treatment Detail/Skilled Intervention Increased education provided on precautions and mobility within prec. Discussion on navigating car transfer and bed mobility within prec. Pt needed ongoing reinforcement with avoiding  IR especially with pivoting; needed to overexaggerate stepping in place to recall to avoid twisting on LLE. Education on energy conservation with return home. Education on length of precautions as well as revisiting prec with v/c for avoiding crossing midline. Wife able to recall prec and will be helpful for maintaining prec upon return home. Left in PACU chair at end of session   Gait Training   Gait Training Minutes (17359) 8   Symptoms Noted During/After Treatment (Gait Training) fatigue   Treatment Detail/Skilled Intervention Facilitated gait training with FWW and CGA intially able to progress to SBA throughout. First couple steps with lateral instability and CGA to correct and maintain FWW on ground. Pt amb ~50ft to stairs. Completed x4 stairs with step to pattern, leading with RLE. Descending with LLE leading. Education on gaurding for wife upon d/c. Agreeable to further ambulation ~150ft with FWW and SBA. Pt with ongoing review for avoiding IR with turns; needing to march in place for recall/adherence. Pt with FWW at home; will not need one for d/c   Distance in Feet 50 150   PT Discharge Planning   PT Plan one time eval/treat - d/c   PT Discharge Recommendation (DC Rec) home with assist;home with outpatient physical therapy   PT Rationale for DC Rec Pt demo ability to complete mobility needed for safe d/c home. Pt has spouse who is able to facilitate assist at home and is already set up with OP PT. Recommend PRN assist for precaution adherence as well as use of FWW for mobility needs   PT Brief overview of current status SBA with STS and gait with FWW   PT Total Distance Amb During Session (feet) 200   Physical Therapy Time and Intention   Timed Code Treatment Minutes 24   Total Session Time (sum of timed and untimed services) 30

## 2025-05-07 NOTE — ANESTHESIA CARE TRANSFER NOTE
Patient: Conor Contreras    Procedure: Procedure(s):  ARTHROPLASTY, HIP, TOTAL       Diagnosis: Primary osteoarthritis of left hip [M16.12]  Diagnosis Additional Information: No value filed.    Anesthesia Type:   Spinal     Note:    Oropharynx: oropharynx clear of all foreign objects and spontaneously breathing  Level of Consciousness: awake  Oxygen Supplementation: face mask  Level of Supplemental Oxygen (L/min / FiO2): 6  Independent Airway: airway patency satisfactory and stable  Dentition: dentition unchanged  Vital Signs Stable: post-procedure vital signs reviewed and stable  Report to RN Given: handoff report given  Patient transferred to: PACU    Handoff Report: Identifed the Patient, Identified the Reponsible Provider, Reviewed the pertinent medical history, Discussed the surgical course, Reviewed Intra-OP anesthesia mangement and issues during anesthesia, Set expectations for post-procedure period and Allowed opportunity for questions and acknowledgement of understanding      Vitals:  Vitals Value Taken Time   BP 85/61 05/07/25 09:56   Temp     Pulse 107 05/07/25 09:56   Resp 19 05/07/25 09:56   SpO2 97 % 05/07/25 09:56   Vitals shown include unfiled device data.    Electronically Signed By: Mel Quinteros  May 7, 2025  9:57 AM

## 2025-05-14 ENCOUNTER — THERAPY VISIT (OUTPATIENT)
Dept: PHYSICAL THERAPY | Facility: CLINIC | Age: 55
End: 2025-05-14
Attending: ORTHOPAEDIC SURGERY
Payer: COMMERCIAL

## 2025-05-14 DIAGNOSIS — Z96.642 STATUS POST TOTAL REPLACEMENT OF LEFT HIP: ICD-10-CM

## 2025-05-14 PROCEDURE — 97161 PT EVAL LOW COMPLEX 20 MIN: CPT | Mod: GP

## 2025-05-14 PROCEDURE — 97110 THERAPEUTIC EXERCISES: CPT | Mod: GP

## 2025-05-14 ASSESSMENT — ACTIVITIES OF DAILY LIVING (ADL)
SPORTS_TOTAL_ITEM_SCORE: 0
LIGHT_TO_MODERATE_WORK: MODERATE DIFFICULTY
HOW_WOULD_YOU_RATE_YOUR_CURRENT_LEVEL_OF_FUNCTION?: ABNORMAL
GETTING_INTO_AND_OUT_OF_AN_AVERAGE_CAR: EXTREME DIFFICULTY
STARTING_AND_STOPPING_QUICKLY: EXTREME DIFFICULTY
WALKING_INITIALLY: MODERATE DIFFICULTY
LOW_IMPACT_ACTIVITIES_LIKE_FAST_WALKING: MODERATE DIFFICULTY
LIGHT_TO_MODERATE_WORK: MODERATE DIFFICULTY
STEPPING_UP_AND_DOWN_CURBS: MODERATE DIFFICULTY
HOS_ADL_SCORE(%): 38.24
WALKING_APPROXIMATELY_10_MINUTES: MODERATE DIFFICULTY
GOING_DOWN_1_FLIGHT_OF_STAIRS: MODERATE DIFFICULTY
PUTTING_ON_SOCKS_AND_SHOES: EXTREME DIFFICULTY
HOW_WOULD_YOU_RATE_YOUR_CURRENT_LEVEL_OF_FUNCTION_DURING_YOUR_USUAL_ACTIVITIES_OF_DAILY_LIVING_FROM_0_TO_100_WITH_100_BEING_YOUR_LEVEL_OF_FUNCTION_PRIOR_TO_YOUR_HIP_PROBLEM_AND_0_BEING_THE_INABILITY_TO_PERFORM_ANY_OF_YOUR_USUAL_DAILY_ACTIVITIES?: 20
WALKING_DOWN_STEEP_HILLS: EXTREME DIFFICULTY
PLEASE_INDICATE_YOR_PRIMARY_REASON_FOR_REFERRAL_TO_THERAPY:: HIP
JUMPING: UNABLE TO DO
ROLLING OVER IN BED: EXTREME DIFFICULTY
GETTING_INTO_AND_OUT_OF_A_BATHTUB: EXTREME DIFFICULTY
WALKING_FOR_APPROXIMATELY_10_MINUTES: MODERATE DIFFICULTY
PUTTING ON SOCKS AND SHOES: EXTREME DIFFICULTY
ADL_SCORE(%): 0
SWINGING_OBJECTS_LIKE_A_GOLF_CLUB: EXTREME DIFFICULTY
SPORTS_SCORE(%): 0
SPORTS_HIGHEST_POTENTIAL_SCORE: 36
RECREATIONAL_ACTIVITIES: MODERATE DIFFICULTY
RUNNING_ONE_MILE: EXTREME DIFFICULTY
ABILITY_TO_PARTICIPATE_IN_YOUR_DESIRED_SPORT_AS_LONG_AS_YOU_WOULD_LIKE: EXTREME DIFFICULTY
SITTING FOR 15 MINUTES: MODERATE DIFFICULTY
ABILITY_TO_PERFORM_ACTIVITY_WITH_YOUR_NORMAL_TECHNIQUE: MODERATE DIFFICULTY
STEPPING UP AND DOWN CURBS: MODERATE DIFFICULTY
HOS_ADL_HIGHEST_POTENTIAL_SCORE: 68
ADL_TOTAL_ITEM_SCORE: 0
ADL_COUNT: 17
GOING UP 1 FLIGHT OF STAIRS: MODERATE DIFFICULTY
TWISTING/PIVOTING_ON_INVOLVED_LEG: EXTREME DIFFICULTY
HEAVY_WORK: EXTREME DIFFICULTY
HOS_ADL_ITEM_SCORE_TOTAL: 26
RECREATIONAL ACTIVITIES: MODERATE DIFFICULTY
WALKING_15_MINUTES_OR_GREATER: MODERATE DIFFICULTY
GETTING_INTO_AND_OUT_OF_A_BATHTUB: EXTREME DIFFICULTY
HEAVY_WORK: EXTREME DIFFICULTY
STANDING FOR 15 MINUTES: MODERATE DIFFICULTY
HOW_WOULD_YOU_RATE_YOUR_CURRENT_LEVEL_OF_FUNCTION_DURING_YOUR_USUAL_ACTIVITIES_OF_DAILY_LIVING_FROM_0_TO_100_WITH_100_BEING_YOUR_LEVEL_OF_FUNCTION_PRIOR_TO_YOUR_HIP_PROBLEM_AND_0_BEING_THE_INABILITY_TO_PERFORM_ANY_OF_YOUR_USUAL_DAILY_ACTIVITIES?: 20
SITTING_FOR_15_MINUTES: MODERATE DIFFICULTY
WALKING_15_MINUTES_OR_GREATER: MODERATE DIFFICULTY
WALKING_UP_STEEP_HILLS: EXTREME DIFFICULTY
ADL_HIGHEST_POTENTIAL_SCORE: 68
WALKING_DOWN_STEEP_HILLS: EXTREME DIFFICULTY
LANDING: EXTREME DIFFICULTY
GETTING INTO AND OUT OF AN AVERAGE CAR: EXTREME DIFFICULTY
WALKING_INITIALLY: MODERATE DIFFICULTY
DEEP SQUATTING: EXTREME DIFFICULTY
CUTTING/LATERAL_MOVEMENTS: EXTREME DIFFICULTY
ROLLING_OVER_IN_BED: EXTREME DIFFICULTY
TWISTING/PIVOTING ON INVOLVED LEG: EXTREME DIFFICULTY
HOW_WOULD_YOU_RATE_YOUR_CURRENT_LEVEL_OF_FUNCTION_DURING_YOUR_SPORTS_RELATED_ACTIVITIES_FROM_0_TO_100_WITH_100_BEING_YOUR_LEVEL_OF_FUNCTION_PRIOR_TO_YOUR_HIP_PROBLEM_AND_0_BEING_THE_INABILITY_TO_PERFORM_ANY_OF_YOUR_USUAL_DAILY_ACTIVITIES?: 20
DEEP_SQUATTING: EXTREME DIFFICULTY
SPORTS_COUNT: 9
GOING_UP_1_FLIGHT_OF_STAIRS: MODERATE DIFFICULTY
GOING DOWN 1 FLIGHT OF STAIRS: MODERATE DIFFICULTY
WALKING_UP_STEEP_HILLS: EXTREME DIFFICULTY
STANDING_FOR_15_MINUTES: MODERATE DIFFICULTY

## 2025-05-14 NOTE — PROGRESS NOTES
PHYSICAL THERAPY EVALUATION  Type of Visit: Evaluation       Fall Risk Screen:  Have you fallen 2 or more times in the past year?: No  Have you fallen and had an injury in the past year?: No    Subjective         Presenting condition or subjective complaint: Hip replacement surgery 7 days ago  Date of onset: 05/07/25    Relevant medical history:     Dates & types of surgery: 5/7/25    Prior diagnostic imaging/testing results: MRI; X-ray; Bone scan     Prior therapy history for the same diagnosis, illness or injury: No      Prior Level of Function  Transfers:   Ambulation:   ADL:   IADL:     Living Environment  Social support: With a significant other or spouse   Type of home: House   Stairs to enter the home: Yes 3 Is there a railing: Yes     Ramp: No   Stairs inside the home: Yes 12 Is there a railing: Yes     Help at home: Self Cares (home health aide/personal care attendant, family, etc); Home management tasks (cooking, cleaning); Medication and/or finances; Home and Yard maintenance tasks; Assist for driving and community activities  Equipment owned: Four-point cane; Walker; Walker with wheels; Raised toilet seat     Employment: Yes  Therapist  Hobbies/Interests: Walking and hiking    Patient goals for therapy: Oversll engage in dsily functioning. Sitting in a chair or desk forlonger periods. Taking longer walks. Going to the gym for exercise.    PHYSICAL THERAPY HIP EVALUATION    SUBJECTIVE:  Conor is a 55 year old male who reports that he thinks he is getting gradually better each day.  He is still dealing with pain and swelling, so he is icing regularly. He is walking more and doing some PT exercises.     Patient reports symptoms of:  Pain, Range of Motion Loss, Stiffness / Tightness, Weakness, and Gait Impairments    Patient report of Pain:  Pain Rating Now: 6/10  Pain Rating at Best: 4/10  Pain Rating at Worst: 8/10  Pain Location: Left anterior/lateral hip along TFL/ITB region and left lateral hip  incisional pain. Some left knee pain.  Pain Quality/Description: Sharp and Stabbing on anterior / lateral left hip.  Left incisional pain is achy and sometimes sharp  Pain Better with: Laying down and resting. Pain medication  Pain Worse with: Prolonged sitting  Progression of Symptoms: improving    Patient reports Red Flags symptoms of:  None    OBJECTIVE:  Lower Extremity Manual Muscle Test / Strength Assessment:  Hip Flexion: Right Hip 5/5, Left Hip DEFERRED  Hip Abduction: Right Hip 5/5, Left Hip DEFERRED  Knee Extension: Right Knee 5/5, Left Knee 5/5  Knee Flexion: Right Knee 5/5, Left Knee 5/5  Ankle Dorsiflexion: Right Ankle 5/5, Left Ankle 5/5    Standing Hip Active ROM  Right Hip:   Hip Flexion: WNL  Hip Abduction: WNL  Hip Extension WNL  Left Hip:  Hip Flexion: Limited to 90 degrees, can acheive  Hip Abduction: Approximately 30 degrees  Hip Extension: Approximately -5 degrees    Functional Tests:  Sit to Stand: Independent  Gait: Using SEC, independent  Standing Balance without Assistive Device: Independent with rightward weight shift       ASSESSMENT:  Conor is a 55 year old male referred to Physical Therapy for S/p Left OSMAN   from Sai Lennon.  Conor demonstrates findings of Pain, Weakness, Motion Loss, Loss of Function, Balance Deficits and Fall Risk, and Gait Deficits that justify a need for formal Physical Therapy. These impairments interfere with their ability to perform self care tasks, work tasks, recreational activities, household chores, driving , household mobility, and community mobility as compared to their previous level of function.    Medical Diagnosis: S/p Left OSMAN    Treatment Diagnosis: S/p Left OSMAN     Clinical Decision Making (Complexity):  Clinical Presentation: Stable/Uncomplicated  Clinical Presentation Rationale: based on medical and personal factors listed in PT evaluation  Clinical Decision Making (Complexity): Low complexity      PHYSICAL THERAPY PLAN OF CARE:  Treatment  Interventions:  Modalities: Cryotherapy, E-stim, Hot Pack, Ultrasound  Interventions: Gait Training, Manual Therapy, Neuromuscular Re-education, Therapeutic Activity, Therapeutic Exercise    Long Term Goals     PT Goal 1  Goal Identifier: Walking  Goal Description: Will be able to walk 3 miles without AD or pain  Rationale: to maximize safety and independence with performance of ADLs and functional tasks;to maximize safety and independence within the home;to maximize safety and independence within the community;to maximize safety and independence with transportation;to maximize safety and independence with self cares  Goal Progress: Can ambulate with his SEC  Target Date: 06/11/25  PT Goal 2  Goal Identifier: Stairs  Goal Description: Will be able to ascend and descend 3 flights of stairs using alternating gait pattern without hip pain or gait deviation or need for UE support  Rationale: to maximize safety and independence with performance of ADLs and functional tasks;to maximize safety and independence within the home;to maximize safety and independence within the community;to maximize safety and independence with transportation;to maximize safety and independence with self cares  Goal Progress: Going one at a time but can navigate stairs  Target Date: 07/09/25    Frequency of Treatment: 1x a week  Duration of Treatment: 8 weeks         Risks and benefits of evaluation/treatment have been explained.   Patient/Family/caregiver agrees with Plan of Care.      Evaluation Time:     PT Eval, Low Complexity Minutes (60940): 20       Signing Clinician: Km Babin PT        SUMMARY OF PLAN OF CARE:  Khoa presents S/p left OSMAN on 5/7/2025 and is doing quite well. He is ambulating with a single end cane independently and pain is well controlled. Contraindications were reviewed, questions answered and we initiated HEP. Will progress per standard left OSMAN and modify based on response.

## 2025-05-15 DIAGNOSIS — Z96.642 STATUS POST TOTAL REPLACEMENT OF LEFT HIP: Primary | ICD-10-CM

## 2025-05-15 NOTE — TELEPHONE ENCOUNTER
Received provider signed forms from  bin and returned to patient via [employer] as requested.    Form was sent via [employer]  Lazara Londono

## 2025-05-19 ENCOUNTER — OFFICE VISIT (OUTPATIENT)
Dept: ORTHOPEDICS | Facility: CLINIC | Age: 55
End: 2025-05-19
Payer: COMMERCIAL

## 2025-05-19 DIAGNOSIS — Z96.642 STATUS POST TOTAL REPLACEMENT OF LEFT HIP: Primary | ICD-10-CM

## 2025-05-19 PROCEDURE — 99024 POSTOP FOLLOW-UP VISIT: CPT | Performed by: PHYSICIAN ASSISTANT

## 2025-05-19 ASSESSMENT — HOOS JR
HOOS JR TOTAL INTERVAL SCORE: 39.9
RISING FROM SITTING: SEVERE
SITTING: SEVERE
LYING IN BED (TURNING OVER, MAINTAINING HIP POSITION): SEVERE
WALKING ON UNEVEN SURFACE: MODERATE
GOING UP OR DOWN STAIRS: MODERATE
BENDING TO THE FLOOR TO PICK UP OBJECT: SEVERE

## 2025-05-19 NOTE — LETTER
5/19/2025      Conor Contreras  [address] [facility] [address]      Dear Colleague,    Thank you for referring your patient, Conor Contreras, to the [facility]. Please see a copy of my visit note below.    Chief Complaint: left hip check  DATE OF SERVICE: 5/7/25  SURGEON: Sai Lennon MD.  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  Patient reports overall he is doing well.  He has some stiffness and soreness with motion of the leg.  He has been working on his home exercises and completed 1 session of physical therapy.  He is taking Tylenol and ibuprofen, as well as oxycodone every 5 hours.  He is using a walker for ambulation.  He denies any pain in the right hip.  No other concerns.    Physical Exam: 55 year old man who is alert and oriented and in no distress.  He has an antalgic gait with wheeled walker today.  His left hip dressing was removed.  Steri-Strips are in place.  Incision is healing well with no erythema or drainage.  He has mild to moderate swelling and no ecchymosis.  No calf tenderness.  He is neurovascularly intact distally.  He has some weakness with left hip flexion while seated.  Minimal pain with passive rotation of the hip.    Imaging: We did review his immediate post-op imaging.  This shows a left total hip arthroplasty in acceptable alignment with no sign of fracture or lucency.     Impression: 55 year old male 2 weeks s/p left total hip arthroplasty for left hip AVN, h/o Right hip AVN, asymptomatic    Plan: Patient can shower and get the incision wet.  No soaking in a tub or pool for 2 weeks.  OK to use vitamin E oil or cocoa butter on the incision once the Steri-Strips are removed..  Cover incision as needed for comfort.  Continue to ice and elevate for swelling control.  Continue to  work with PT for range of motion and strengthening, as well as working on home exercises. Follow hip precautions as reviewed today.  Follow-up in 1 month for check of progress.  Long-term post-op plan was reviewed.  All questions were answered today.        Again, thank you for allowing me to participate in the care of your patient.        Sincerely,        Nay Galloway PA-C    Electronically signed

## 2025-05-19 NOTE — PROGRESS NOTES
Chief Complaint: left hip check  DATE OF SERVICE: 5/7/25  SURGEON: Sai Lennon MD.  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  Patient reports overall he is doing well.  He has some stiffness and soreness with motion of the leg.  He has been working on his home exercises and completed 1 session of physical therapy.  He is taking Tylenol and ibuprofen, as well as oxycodone every 5 hours.  He is using a walker for ambulation.  He denies any pain in the right hip.  No other concerns.    Physical Exam: 55 year old man who is alert and oriented and in no distress.  He has an antalgic gait with wheeled walker today.  His left hip dressing was removed.  Steri-Strips are in place.  Incision is healing well with no erythema or drainage.  He has mild to moderate swelling and no ecchymosis.  No calf tenderness.  He is neurovascularly intact distally.  He has some weakness with left hip flexion while seated.  Minimal pain with passive rotation of the hip.    Imaging: We did review his immediate post-op imaging.  This shows a left total hip arthroplasty in acceptable alignment with no sign of fracture or lucency.     Impression: 55 year old male 2 weeks s/p left total hip arthroplasty for left hip AVN, h/o Right hip AVN, asymptomatic    Plan: Patient can shower and get the incision wet.  No soaking in a tub or pool for 2 weeks.  OK to use vitamin E oil or cocoa butter on the incision once the Steri-Strips are removed..  Cover incision as needed for comfort.  Continue to ice and elevate for swelling control.  Continue to work with PT for range of motion and strengthening, as well as working on home exercises. Follow hip precautions as reviewed today.  Follow-up in 1 month for check of progress.  Long-term post-op plan was reviewed.  All questions were answered  today.

## 2025-05-24 DIAGNOSIS — S46.812S STRAIN OF LEFT TRAPEZIUS MUSCLE, SEQUELA: ICD-10-CM

## 2025-05-27 RX ORDER — BACLOFEN 10 MG/1
10 TABLET ORAL 3 TIMES DAILY PRN
Qty: 90 TABLET | Refills: 3 | Status: SHIPPED | OUTPATIENT
Start: 2025-05-27

## 2025-05-28 ENCOUNTER — THERAPY VISIT (OUTPATIENT)
Dept: PHYSICAL THERAPY | Facility: CLINIC | Age: 55
End: 2025-05-28
Attending: ORTHOPAEDIC SURGERY
Payer: COMMERCIAL

## 2025-05-28 DIAGNOSIS — J45.20 MILD INTERMITTENT ASTHMA WITHOUT COMPLICATION: ICD-10-CM

## 2025-05-28 DIAGNOSIS — Z96.642 STATUS POST TOTAL REPLACEMENT OF LEFT HIP: Primary | ICD-10-CM

## 2025-05-28 DIAGNOSIS — D86.9 SARCOIDOSIS: ICD-10-CM

## 2025-05-28 LAB — PULMONARY FUNCTION TEST-FENO: 28 PPB (ref 0–40)

## 2025-05-28 PROCEDURE — 97140 MANUAL THERAPY 1/> REGIONS: CPT | Mod: GP

## 2025-05-28 PROCEDURE — 97116 GAIT TRAINING THERAPY: CPT | Mod: GP

## 2025-05-28 PROCEDURE — 97110 THERAPEUTIC EXERCISES: CPT | Mod: GP

## 2025-05-29 LAB
EXPTIME-PRE: 5.76 SEC
FEF2575-%PRED-PRE: 119 %
FEF2575-PRE: 3.68 L/SEC
FEF2575-PRED: 3.08 L/SEC
FEFMAX-%PRED-PRE: 83 %
FEFMAX-PRE: 8 L/SEC
FEFMAX-PRED: 9.59 L/SEC
FEV1-%PRED-PRE: 105 %
FEV1-PRE: 3.71 L
FEV1FEV6-PRE: 81 %
FEV1FEV6-PRED: 80 %
FEV1FVC-PRE: 80 %
FEV1FVC-PRED: 79 %
FIFMAX-PRE: 5.63 L/SEC
FVC-%PRED-PRE: 103 %
FVC-PRE: 4.64 L
FVC-PRED: 4.48 L

## 2025-06-04 ENCOUNTER — THERAPY VISIT (OUTPATIENT)
Dept: PHYSICAL THERAPY | Facility: CLINIC | Age: 55
End: 2025-06-04
Payer: COMMERCIAL

## 2025-06-04 DIAGNOSIS — Z96.642 STATUS POST TOTAL REPLACEMENT OF LEFT HIP: Primary | ICD-10-CM

## 2025-06-04 PROCEDURE — 97110 THERAPEUTIC EXERCISES: CPT | Mod: GP

## 2025-06-05 ENCOUNTER — MYC MEDICAL ADVICE (OUTPATIENT)
Dept: UROLOGY | Facility: CLINIC | Age: 55
End: 2025-06-05

## 2025-06-05 ENCOUNTER — MYC MEDICAL ADVICE (OUTPATIENT)
Dept: FAMILY MEDICINE | Facility: CLINIC | Age: 55
End: 2025-06-05

## 2025-06-05 DIAGNOSIS — N40.1 BPH WITH LOWER URINARY TRACT SYMPTOMS WITHOUT URINARY OBSTRUCTION: ICD-10-CM

## 2025-06-05 NOTE — TELEPHONE ENCOUNTER
Pt reaching out again regarding tamsulosin refill request.     Originally sent to provider on 5/26.    Joanne Yang RN

## 2025-06-06 RX ORDER — TAMSULOSIN HYDROCHLORIDE 0.4 MG/1
0.4 CAPSULE ORAL AT BEDTIME
Qty: 90 CAPSULE | Refills: 1 | Status: SHIPPED | OUTPATIENT
Start: 2025-06-06

## 2025-06-06 NOTE — TELEPHONE ENCOUNTER
Carlito Delarosa MD to [facility] [place] (Selected Message)        6/6/25  4:46 PM  Hi team,     Yes, okay to resume tamsulosin 0.4 mg daily.     Thanks,   Carlito Delarosa M.D.  ______________________    Medication refilled to pt's [facility] in [place].  MyChart response sent to pt.    Tamy Alexander RN

## 2025-07-09 ENCOUNTER — OFFICE VISIT (OUTPATIENT)
Dept: PULMONOLOGY | Facility: CLINIC | Age: 55
End: 2025-07-09
Payer: COMMERCIAL

## 2025-07-09 VITALS
SYSTOLIC BLOOD PRESSURE: 115 MMHG | WEIGHT: 197.6 LBS | HEART RATE: 99 BPM | BODY MASS INDEX: 28.35 KG/M2 | DIASTOLIC BLOOD PRESSURE: 74 MMHG | OXYGEN SATURATION: 95 %

## 2025-07-09 DIAGNOSIS — J45.20 MILD INTERMITTENT ASTHMA WITHOUT COMPLICATION: Primary | ICD-10-CM

## 2025-07-09 DIAGNOSIS — D86.9 SARCOIDOSIS: ICD-10-CM

## 2025-07-09 PROCEDURE — 3078F DIAST BP <80 MM HG: CPT | Performed by: INTERNAL MEDICINE

## 2025-07-09 PROCEDURE — G2211 COMPLEX E/M VISIT ADD ON: HCPCS | Performed by: INTERNAL MEDICINE

## 2025-07-09 PROCEDURE — 3074F SYST BP LT 130 MM HG: CPT | Performed by: INTERNAL MEDICINE

## 2025-07-09 PROCEDURE — 99215 OFFICE O/P EST HI 40 MIN: CPT | Performed by: INTERNAL MEDICINE

## 2025-07-09 NOTE — PROGRESS NOTES
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  To briefly review, he was seen in 2014 for new symptoms of cough and SOB. CT chest performed in 2014 showing bilateral calcified nodules and mediastinal/hilar LAD. He had EBUS-TBNA in 2015 which was non-diagnostic with regard to FNA of LN. At that time, cultures were negative. IgG subclasses were unremarkable. CD4:CD8 ratio was 0.9. Airway exam was unremarkable. He did not require prednisone nor any treatments for sarcoid. A FENa done at that time was elevated at 41. PFTs showed no obstruction nor restriction but did have some inspiratory loop flattening.  When I saw him in clinic, as part of ancillary evaluation for sarcoid, I checked an ACE level which was borderline but vit D studies were unremarkable. He also appeared to have an elevated FENO and did have a positive bronchodilator response on PFTs and I though he probably had asthma and less likely airway sarcoid. I prescribed Advair for him.  Advair was very helpful with interval resolution of cough and improved SOB and exercise tolerance. However, he has been quite hesitant taking Advair given the recent diagnosis of AVN and osteopenia.  Personal hx of cancer: no  - Family hx of cancer: no  - Exposure hx: Denies asbestos or radon exposure   - Tobacco hx: 1/2ppd x5yrs. Quit 20yrs ago.   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 Has 4 cats and 1 dog. No allergies.       Review of Systems:  10 of 14 systems reviewed and are negative unless otherwise stated in HPI.    Past Medical History:   Diagnosis Date    ADD (attention deficit disorder) 2003    Depressive disorder     Dysthymia 2002    Elevated lipids     Generalised anxiety disorder 04/25/2012    Lung nodules     HARLAN (obstructive sleep apnea) 06/22/2011    cpap at night       Past  Surgical History:   Procedure Laterality Date    ARTHROPLASTY HIP Left 2025    Procedure: Left total hip arthroplasty;  Surgeon: Sai Lennon MD;  Location: [facility]    ENDOBRONCHIAL ULTRASOUND FLEXIBLE N/A 2015    Procedure: ENDOBRONCHIAL ULTRASOUND FLEXIBLE;  Surgeon: Ramakrishna Williamson MD;  Location: [facility]    VASECTOMY         Family History   Problem Relation Age of Onset    C.A.D. Maternal Grandfather     Alcohol/Drug Maternal Grandfather          from alcoholism    Cerebrovascular Disease Maternal Grandfather     Coronary Artery Disease Maternal Grandfather     Prostate Cancer Maternal Grandfather     Lipids Father     Cancer Maternal Grandmother         Pancreatic    Cancer Paternal Grandmother         Lung, may not have been primary    Heart Disease Paternal Grandfather     Anxiety Disorder Sister     Anxiety Disorder Sister     LUNG DISEASE Maternal Uncle         Asthma versus sarcoid    Asthma Other     Diabetes No family hx of     Hypertension No family hx of     Breast Cancer No family hx of     Cancer - colorectal No family hx of     Connective Tissue Disorder No family hx of     Thyroid Disease No family hx of        Social History     Socioeconomic History    Marital status:      Spouse name: Lesly    Number of children: 0   Occupational History    Occupation: Alcohol and drug counselor     Employer: [employer]     Comment: [employer]   Tobacco Use    Smoking status: Former     Current packs/day: 0.00     Average packs/day: 0.5 packs/day for 10.0 years (5.0 ttl pk-yrs)     Types: Cigarettes     Start date: 3/18/1991     Quit date: 3/18/2001     Years since quittin.1    Smokeless tobacco: Never    Tobacco comments:     QUIT 9 YEARS AGO   Vaping Use    Vaping status: Never Used   Substance and Sexual Activity    Alcohol use: Yes     Comment: 6 to 8 beers per week    Drug use: No     Comment: h/o drugs, denies past 13 yrs    Sexual activity: Yes     Partners:  Female     Birth control/protection: Male Surgical     Comment:  one partner   Other Topics Concern    Parent/sibling w/ CABG, MI or angioplasty before 65F 55M? No     Service No    Blood Transfusions No    Caffeine Concern No    Occupational Exposure No    Hobby Hazards No    Sleep Concern No    Stress Concern No    Weight Concern No    Special Diet No    Back Care No    Exercise Yes     Comment: Sometimes    Bike Helmet No    Seat Belt Yes    Self-Exams No   Social History Narrative    The patient has a 10 or less pk yr tobacco hx.  He has no active use.  Alcohol use is 12 alcoholic drinks per week.   He has hx of recreational drug use with methamphetamines - inhaled, marijuana. Some rare hx of IVDU.         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      Social Determinants of Health     Financial Resource Strain: Low Risk  (12/5/2023)    Financial Resource Strain     Within the past 12 months, have you or your family members you live with been unable to get utilities (heat, electricity) when it was really needed?: No   Food Insecurity: Low Risk  (12/5/2023)    Food Insecurity     Within the past 12 months, did you worry that your food would run out before you got money to buy more?: No     Within the past 12 months, did the food you bought just not last and you didn t have money to get more?: No   Transportation Needs: Low Risk  (12/5/2023)    Transportation Needs     Within the past 12 months, has lack of transportation kept you from medical appointments, getting your medicines, non-medical meetings or appointments, work, or from getting things that you need?: No   Interpersonal Safety: Low Risk  (12/5/2023)    Interpersonal Safety     Do you feel physically and emotionally safe where you currently live?: Yes     Within the past 12 months, have you been hit, slapped,  kicked or otherwise physically hurt by someone?: No     Within the past 12 months, have you been humiliated or emotionally abused in other ways by your partner or ex-partner?: No   Housing Stability: Low Risk  (12/5/2023)    Housing Stability     Do you have housing? : Yes     Are you worried about losing your housing?: No         No Known Allergies      Current Outpatient Medications:     acetaminophen (TYLENOL) 325 MG tablet, Take 2 tablets (650 mg) by mouth every 4 hours as needed for other (mild pain)., Disp: 100 tablet, Rfl: 0    albuterol (PROAIR HFA/PROVENTIL HFA/VENTOLIN HFA) 108 (90 Base) MCG/ACT inhaler, Inhale 1-2 puffs into the lungs every 4 hours as needed for shortness of breath, wheezing or cough, Disp: 18 g, Rfl: 2    amphetamine-dextroamphetamine (ADDERALL XR) 15 MG per 24 hr capsule, TAKE ONE CAPSULE BY MOUTH EVERY MORNING PLEASE MAKE AN APPOINTMENT FOR FUTURE REFILLS, Disp: , Rfl: 0    aspirin 81 MG EC tablet, Take 1 tablet (81 mg) by mouth 2 times daily., Disp: 60 tablet, Rfl: 0    azelastine (ASTELIN) 0.1 % nasal spray, USE 2 SPRAYS IN EACH NOSTRIL TWICE DAILY, Disp: 90 mL, Rfl: 1    baclofen (LIORESAL) 10 MG tablet, TAKE 1 TABLET(10 MG) BY MOUTH THREE TIMES DAILY AS NEEDED FOR MUSCLE SPASMS, Disp: 90 tablet, Rfl: 3    cetirizine (ZYRTEC) 10 MG tablet, Take 1 tablet (10 mg) by mouth At Bedtime, Disp: 30 tablet, Rfl: 11    montelukast (SINGULAIR) 10 MG tablet, TAKE 1 TABLET(10 MG) BY MOUTH AT BEDTIME, Disp: 30 tablet, Rfl: 5    ondansetron (ZOFRAN ODT) 4 MG ODT tab, Take 1 tablet (4 mg) by mouth every 6 hours as needed for nausea or vomiting., Disp: 10 tablet, Rfl: 0    oxyCODONE (ROXICODONE) 5 MG tablet, Take 1-2 tablets (5-10 mg) by mouth every 4 hours as needed for moderate pain., Disp: 30 tablet, Rfl: 0    senna-docusate (SENOKOT-S/PERICOLACE) 8.6-50 MG tablet, Take 1 tablet by mouth 2 times daily as needed for constipation., Disp: 30 tablet, Rfl: 0    tamsulosin (FLOMAX) 0.4 MG capsule, Take  "1 capsule (0.4 mg) by mouth at bedtime., Disp: 90 capsule, Rfl: 1    tiZANidine (ZANAFLEX) 4 MG tablet, Take 1 tablet (4 mg) by mouth 3 times daily as needed for muscle spasms., Disp: 90 tablet, Rfl: 11    traZODone (DESYREL) 50 MG tablet, Take 50 mg by mouth at bedtime., Disp: , Rfl:     venlafaxine (EFFEXOR XR) 150 MG 24 hr capsule, Take 75 mg by mouth at bedtime., Disp: , Rfl:       Physical Exam:  /74 (BP Location: Left arm, Patient Position: Chair, Cuff Size: Adult Regular)   Pulse 99   Wt 89.6 kg (197 lb 9.6 oz)   SpO2 95%   BMI 28.35 kg/m    GENERAL: Well developed, well nourished, alert, and in no apparent distress.  HEENT: Normocephalic, atraumatic. PERRL, EOMI. Oral mucosa is moist. No perioral cyanosis.  NECK: supple, no masses, no thyromegaly.  RESP:  Normal respiratory effort.  CTAB.  No rales, wheezes, rhonchi.  No cyanosis or clubbing.  CV: Normal S1, S2, regular rhythm, normal rate. No murmur.  No LE edema.   ABDOMEN:  Soft, non-tender, non-distended.   SKIN: warm and dry. No rash.  NEURO: AAOx3.  Normal gait.  Fluent speech.  PSYCH: mentation appears normal.       Results:  PFTs: Discussed and reviewed with patient- flattening of inspiratory loop. FENO is now normal  Most Recent [facility] Pulmonary Function Testing    FVC-Pred   Date Value Ref Range Status   05/28/2025 4.48 L      FVC-Pre   Date Value Ref Range Status   05/28/2025 4.64 L      FVC-%Pred-Pre   Date Value Ref Range Status   05/28/2025 103 %      FEV1-Pre   Date Value Ref Range Status   05/28/2025 3.71 L      FEV1-%Pred-Pre   Date Value Ref Range Status   05/28/2025 105 %      FEV1FVC-Pred   Date Value Ref Range Status   05/28/2025 79 %      FEV1FVC-Pre   Date Value Ref Range Status   05/28/2025 80 %      No results found for: \"20029\"  FEFMax-Pred   Date Value Ref Range Status   05/28/2025 9.59 L/sec      FEFMax-Pre   Date Value Ref Range Status   05/28/2025 8.00 L/sec      FEFMax-%Pred-Pre   Date Value Ref Range Status " "  05/28/2025 83 %      ExpTime-Pre   Date Value Ref Range Status   05/28/2025 5.76 sec      FIFMax-Pre   Date Value Ref Range Status   05/28/2025 5.63 L/sec      FEV1FEV6-Pred   Date Value Ref Range Status   05/28/2025 80 %      FEV1FEV6-Pre   Date Value Ref Range Status   05/28/2025 81 %      No results found for: \"20055\"   Imaging (personally reviewed in clinic today):      Assessment and Plan:   Pulmonary Sarcoid (Not biopsy proven)/Asthma  CT chest performed in 2014 showing bilateral calcified nodules and mediastinal/hilar LAD. He had EBUS-TBNA in 2015 which was non-diagnostic with regard to FNA of LN. At that time, cultures were negative. IgG subclasses were unremarkable. CD4:CD8 ratio was 0.9. Airway exam was unremarkable. He did not require prednisone nor any treatments for sarcoid.  Current CT chest shows no new findings suggestive of progression. No fibrotic changes. There are some evidence of air trapping and he may have asthma or airway sarcoid. Given that he has elevated FENO, this is more likely asthma.  Advair has been helpful and there has been improved control of type 2 inflammation with normal FENO (previously elevated). However, he has been quite hesitant given the recent diagnosis of AVN and osteopenia.  I explained to him that this is less likely the culprit given that he has been taking the low dose inhaler for less than 1 year.  He knows to get back to using the inhaler if there is a recurrence in his symptoms.   I advised him to get yearly eye exams and a referral was placed to the eye team. EKGs and echos have been unremarkable        Questions and concerns were answered to the patient's satisfaction.  he was provided with my contact information should new questions or concerns arise in the interim.  he should return to clinic in 12 months with PFTs  Up to date on vaccinations    I spent 60 minutes on the date of the encounter doing chart review, history and exam, documentation and further " coordination as noted above exclusive of time interpreting PFT, Chest Xray, CT Chest.  Sherlyn Rice MD  Pulmonary, Critical Care and Sleep Medicine  [facility]  Pager: [phone]        The above note was dictated using voice recognition software and may include typographical errors. Please contact the author for any clarifications.

## 2025-07-09 NOTE — NURSING NOTE
Conor Contreras's goals for this visit include:   Chief Complaint   Patient presents with    Follow Up     ILD     Asthma       He requests these members of his care team be copied on today's visit information: no     PCP: Rocky Yi    Referring Provider:  Sherlyn Rice MD  [address] [place] [id number]    /74 (BP Location: Left arm, Patient Position: Chair, Cuff Size: Adult Regular)   Pulse 99   Wt 89.6 kg (197 lb 9.6 oz)   SpO2 95%   BMI 28.35 kg/m      Do you need any medication refills at today's visit? No     ALLAN Cortez   Neph/Pulm [facility]

## 2025-07-10 ENCOUNTER — PATIENT OUTREACH (OUTPATIENT)
Dept: CARE COORDINATION | Facility: CLINIC | Age: 55
End: 2025-07-10
Payer: COMMERCIAL

## 2025-07-14 ENCOUNTER — PATIENT OUTREACH (OUTPATIENT)
Dept: CARE COORDINATION | Facility: CLINIC | Age: 55
End: 2025-07-14
Payer: COMMERCIAL

## 2025-07-29 ENCOUNTER — OFFICE VISIT (OUTPATIENT)
Dept: ORTHOPEDICS | Facility: CLINIC | Age: 55
End: 2025-07-29
Payer: COMMERCIAL

## 2025-07-29 DIAGNOSIS — Z96.642 STATUS POST TOTAL REPLACEMENT OF LEFT HIP: Primary | ICD-10-CM

## 2025-07-29 PROCEDURE — 99024 POSTOP FOLLOW-UP VISIT: CPT | Performed by: ORTHOPAEDIC SURGERY

## 2025-07-29 ASSESSMENT — HOOS JR: HOOS JR TOTAL INTERVAL SCORE: 100

## 2025-07-29 NOTE — LETTER
7/29/2025      Conor Contreras  [address] [facility] [address]      Dear Colleague,    Thank you for referring your patient, Conor Contreras, to the [facility]. Please see a copy of my visit note below.    Subjective: Returns today 12 weeks status post total hip.  Reports he is doing very well.  He is back to all his regular activities.  He is using no pain medication.  He is using no assist device.  He is very happy with how he is doing so far.  Presents for gait check.    HOOS Ernie: 100    Objective: He rises easily from a chair.  He walks with a normal-appearing gait.  He can easily walk on his toes and heels.    Assessment 12 weeks status post total hip arthroplasty doing very well.  We discussed activities.  We discussed dental prophylaxis.  We discussed infection risk.  We discussed follow-up.  All the patient's questions were answered to the best of my ability.    Plan: Follow-up in 1 year.  Sooner if issues.      Again, thank you for allowing me to participate in the care of your patient.        Sincerely,        Sai Lennon MD    Electronically signed

## 2025-07-29 NOTE — PROGRESS NOTES
Subjective: Returns today 12 weeks status post total hip.  Reports he is doing very well.  He is back to all his regular activities.  He is using no pain medication.  He is using no assist device.  He is very happy with how he is doing so far.  Presents for gait check.    ROSIE Ernie: 100    Objective: He rises easily from a chair.  He walks with a normal-appearing gait.  He can easily walk on his toes and heels.    Assessment 12 weeks status post total hip arthroplasty doing very well.  We discussed activities.  We discussed dental prophylaxis.  We discussed infection risk.  We discussed follow-up.  All the patient's questions were answered to the best of my ability.    Plan: Follow-up in 1 year.  Sooner if issues.

## 2025-07-29 NOTE — NURSING NOTE
Conor Contreras's chief complaint for this visit includes:  Chief Complaint   Patient presents with    Left Hip - Total Joint Post-op     3mo s/p LTHA 5/7/25       Referring Provider:  Referred Self, MD  No address on file    There were no vitals taken for this visit.  Data Unavailable   Global Mental Health Score: (Patient-Rptd) (P) 18  Global Physical Health Score: (Patient-Rptd) (P) 19  PROMIS TOTAL - SUBSCORES: (Patient-Rptd) (P) 37  [facility]: 5  HOOS Jr. 100     Pain increases with: None  Previous surgeries: HA 5/7/25  Previous injections within last 6 months: NO  Treatments done: HEP  Imaging completed: NA  Pain: 0/10  Concerns: None     Juan Daniel Fernanedz, ATC

## 2025-08-19 ENCOUNTER — MYC MEDICAL ADVICE (OUTPATIENT)
Dept: ORTHOPEDICS | Facility: CLINIC | Age: 55
End: 2025-08-19
Payer: COMMERCIAL

## 2025-08-24 ENCOUNTER — MYC MEDICAL ADVICE (OUTPATIENT)
Dept: PULMONOLOGY | Facility: CLINIC | Age: 55
End: 2025-08-24
Payer: COMMERCIAL

## 2025-08-24 DIAGNOSIS — J45.20 MILD INTERMITTENT ASTHMA WITHOUT COMPLICATION: Primary | ICD-10-CM

## 2025-08-25 RX ORDER — FLUTICASONE PROPIONATE AND SALMETEROL 100; 50 UG/1; UG/1
1 POWDER RESPIRATORY (INHALATION) EVERY 12 HOURS
Qty: 1 EACH | Refills: 0 | Status: SHIPPED | OUTPATIENT
Start: 2025-08-25

## 2025-08-25 RX ORDER — MOMETASONE FUROATE MONOHYDRATE 50 UG/1
2 SPRAY, METERED NASAL DAILY
Qty: 17 G | Refills: 0 | Status: SHIPPED | OUTPATIENT
Start: 2025-08-25

## (undated) DEVICE — DRSG TEGADERM 4X10" 1627

## (undated) DEVICE — DRAPE STERI TOWEL LG 1010

## (undated) DEVICE — LINEN TOWEL PACK X5 5464

## (undated) DEVICE — ESU GROUND PAD ADULT W/CORD E7507

## (undated) DEVICE — SU ETHIBOND 5 V-37 4X30" MB66G

## (undated) DEVICE — DRSG STERI STRIP 1/2X4" R1547

## (undated) DEVICE — PACK TOTAL HIP W/POUCH RIVERSIDE LATEX FREE

## (undated) DEVICE — POSITIONER ABDUCTION PILLOW FOAM MED APG 202

## (undated) DEVICE — BONE CLEANING TIP INTERPULSE  0210-010-000

## (undated) DEVICE — SU VICRYL 2-0 CT-1 27" UND J259H

## (undated) DEVICE — SU DERMABOND ADVANCED .7ML DNX12

## (undated) DEVICE — STRAP STIRRUP W/SLIP 30187-030

## (undated) DEVICE — DRSG KERLIX 4 1/2"X4YDS ROLL 6730

## (undated) DEVICE — BLADE SAW RECIP STRK 70X6X0.025MM 0277-096-250S5

## (undated) DEVICE — SOLUTION WATER 1000ML R5000-01

## (undated) DEVICE — MITT SURGICAL PREP HAIR REMOVER LATEX FREE 617142

## (undated) DEVICE — GLOVE BIOGEL PI SZ 7.5 40875

## (undated) DEVICE — STRAP POSITIONING 60X31" BODY KNEE KBS 01

## (undated) DEVICE — PREP CHLORAPREP 26ML TINTED HI-LITE ORANGE 930815

## (undated) DEVICE — SOLUTION IRRIGATION 0.9% NACL 1000ML R5200-01

## (undated) DEVICE — DEVICE RETRIEVER HEWSON 71111579

## (undated) DEVICE — SUCTION IRR SYSTEM W/O TIP INTERPULSE HANDPIECE 0210-100-000

## (undated) DEVICE — VIAL DECANTER STERILE WHITE DYNJDEC06

## (undated) DEVICE — SOLUTION IV 0.9% NACL 1000ML E8000

## (undated) DEVICE — SPONGE LAP 18X18" X8435

## (undated) DEVICE — DRILL BIT FLEXIBLE REFLECTION 35MM 71362935

## (undated) DEVICE — BLADE KNIFE SURG 20 371120

## (undated) DEVICE — SUCTION MANIFOLD NEPTUNE 2 SYS 4 PORT 0702-020-000

## (undated) DEVICE — LINEN MAYO STAND COVER OVERSIZE PACK 5458

## (undated) DEVICE — SU MONOCRYL 3-0 PS-1 27" Y936H

## (undated) DEVICE — DRSG SILVERCEL 4.25X4.25" 900404

## (undated) DEVICE — LINEN BACK PACK 5440

## (undated) DEVICE — GOWN IMPERVIOUS SPECIALTY XLG/XLONG 32474

## (undated) DEVICE — GLOVE BIOGEL PI MICRO INDICATOR UNDERGLOVE SZ 8.0 48980

## (undated) DEVICE — HOOD SURG T7PLUS PEEL AWAY FACE SHIELD STRL LF 0416-801-100

## (undated) DEVICE — DRAPE IOBAN INCISE 36X23" 6651EZ

## (undated) DEVICE — SU VICRYL+ 0 CT 36" DYED VCP358H

## (undated) RX ORDER — CEFAZOLIN SODIUM/WATER 2 G/20 ML
SYRINGE (ML) INTRAVENOUS
Status: DISPENSED
Start: 2025-05-07

## (undated) RX ORDER — PROPOFOL 10 MG/ML
INJECTION, EMULSION INTRAVENOUS
Status: DISPENSED
Start: 2025-05-07

## (undated) RX ORDER — FENTANYL CITRATE 50 UG/ML
INJECTION, SOLUTION INTRAMUSCULAR; INTRAVENOUS
Status: DISPENSED
Start: 2025-05-07

## (undated) RX ORDER — ONDANSETRON 2 MG/ML
INJECTION INTRAMUSCULAR; INTRAVENOUS
Status: DISPENSED
Start: 2025-05-07

## (undated) RX ORDER — TRANEXAMIC ACID 650 MG/1
TABLET ORAL
Status: DISPENSED
Start: 2025-05-07

## (undated) RX ORDER — FENTANYL CITRATE-0.9 % NACL/PF 10 MCG/ML
PLASTIC BAG, INJECTION (ML) INTRAVENOUS
Status: DISPENSED
Start: 2025-05-07

## (undated) RX ORDER — ACETAMINOPHEN 325 MG/1
TABLET ORAL
Status: DISPENSED
Start: 2025-05-07

## (undated) RX ORDER — OXYCODONE HYDROCHLORIDE 5 MG/1
TABLET ORAL
Status: DISPENSED
Start: 2025-05-07